# Patient Record
Sex: FEMALE | Race: WHITE | Employment: PART TIME | URBAN - METROPOLITAN AREA
[De-identification: names, ages, dates, MRNs, and addresses within clinical notes are randomized per-mention and may not be internally consistent; named-entity substitution may affect disease eponyms.]

---

## 2017-01-20 ENCOUNTER — TELEPHONE (OUTPATIENT)
Dept: FAMILY MEDICINE CLINIC | Age: 47
End: 2017-01-20

## 2017-01-20 DIAGNOSIS — Z12.39 SCREENING FOR BREAST CANCER: ICD-10-CM

## 2017-01-20 DIAGNOSIS — Z98.82 STATUS POST BREAST AUGMENTATION: Primary | ICD-10-CM

## 2017-01-20 NOTE — TELEPHONE ENCOUNTER
Rosalinda from [facility] radiology called this afternoon. She states patient's mammogram order  and requesting new order. She also states patient has implants and her ICD-10 code would be Z41.1. Please call her back at your earliest convenience, XV#[phone].

## 2017-01-23 NOTE — TELEPHONE ENCOUNTER
Spoke with Austin Jonas. She states that when call was placed requesting a new order patient was in the radiology department. Rosalinda contacted patient's OB/GYN who was available. Mammogram order was placed by OB/GYN.

## 2017-01-23 NOTE — TELEPHONE ENCOUNTER
Z41.1 states that it is s/p rhinoplasty. I added a different code for her history of breast augmentation.

## 2017-05-11 ENCOUNTER — HOSPITAL ENCOUNTER (OUTPATIENT)
Dept: LAB | Age: 47
Discharge: HOME | End: 2017-05-11
Payer: COMMERCIAL

## 2017-05-11 LAB
CHOLEST SERPL-MCNC: 160 MG/DL
HDLC SERPL-MCNC: 62 MG/DL (ref 40–60)
HDLC SERPL: 2.6 {RATIO} (ref 0–5)
LDLC SERPL CALC-MCNC: 77.6 MG/DL (ref 0–100)
LIPID PROFILE,FLP: ABNORMAL
TRIGL SERPL-MCNC: 102 MG/DL (ref ?–150)
TSH SERPL DL<=0.05 MIU/L-ACNC: 4.48 UIU/ML (ref 0.36–3.74)
VLDLC SERPL CALC-MCNC: 20.4 MG/DL

## 2017-05-11 PROCEDURE — 84443 ASSAY THYROID STIM HORMONE: CPT | Performed by: FAMILY MEDICINE

## 2017-05-11 PROCEDURE — 36415 COLL VENOUS BLD VENIPUNCTURE: CPT | Performed by: FAMILY MEDICINE

## 2017-05-11 PROCEDURE — 80061 LIPID PANEL: CPT | Performed by: FAMILY MEDICINE

## 2017-05-18 ENCOUNTER — OFFICE VISIT (OUTPATIENT)
Dept: FAMILY MEDICINE CLINIC | Age: 47
End: 2017-05-18

## 2017-05-18 VITALS
WEIGHT: 142 LBS | OXYGEN SATURATION: 99 % | DIASTOLIC BLOOD PRESSURE: 76 MMHG | HEIGHT: 65 IN | HEART RATE: 81 BPM | RESPIRATION RATE: 14 BRPM | TEMPERATURE: 99 F | BODY MASS INDEX: 23.66 KG/M2 | SYSTOLIC BLOOD PRESSURE: 112 MMHG

## 2017-05-18 DIAGNOSIS — Z80.8 FAMILY HISTORY OF MELANOMA: ICD-10-CM

## 2017-05-18 DIAGNOSIS — Z91.048 ALLERGY TO ADHESIVE: ICD-10-CM

## 2017-05-18 DIAGNOSIS — E03.9 ACQUIRED HYPOTHYROIDISM: ICD-10-CM

## 2017-05-18 DIAGNOSIS — F32.A DEPRESSION, UNSPECIFIED DEPRESSION TYPE: Primary | ICD-10-CM

## 2017-05-18 DIAGNOSIS — M06.9 RHEUMATOID ARTHRITIS, INVOLVING UNSPECIFIED SITE, UNSPECIFIED RHEUMATOID FACTOR PRESENCE: ICD-10-CM

## 2017-05-18 RX ORDER — BUPROPION HYDROCHLORIDE 150 MG/1
TABLET ORAL
Qty: 30 TAB | Refills: 5 | Status: SHIPPED | OUTPATIENT
Start: 2017-05-18 | End: 2017-06-06 | Stop reason: SDUPTHER

## 2017-05-18 RX ORDER — LEVOTHYROXINE SODIUM 75 UG/1
75 TABLET ORAL
Qty: 30 TAB | Refills: 5 | Status: SHIPPED | OUTPATIENT
Start: 2017-05-18 | End: 2017-06-06

## 2017-05-18 NOTE — PROGRESS NOTES
Chief Complaint   Patient presents with    Thyroid Problem    Depression    Results     review     1. Have you been to the ER, urgent care clinic since your last visit? Hospitalized since your last visit? No    2. Have you seen or consulted any other health care providers outside of the [address] [place] [address] since your last visit? Include any pap smears or colon screening.  Yes, Dr. Cris Schneider

## 2017-05-18 NOTE — MR AVS SNAPSHOT
Visit Information Date & Time Provider Department Dept. Phone Encounter #  
 5/18/2017  9:45 AM Ashly Mcduffie, [address] [id number] Follow-up Instructions Return in about 6 months (around 11/18/2017) for thyroid. Fasting labs due 3-7 days prior to appointment. Upcoming Health Maintenance Date Due INFLUENZA AGE 9 TO ADULT 8/1/2017 BREAST CANCER SCRN MAMMOGRAM 1/20/2018 PAP AKA CERVICAL CYTOLOGY 7/29/2019 DTaP/Tdap/Td series (2 - Td) 11/14/2023 Allergies as of 5/18/2017  Review Complete On: 5/18/2017 By: Ashly Mcduffie MD  
 No Known Allergies Current Immunizations  Reviewed on 9/8/2016 Name Date Influenza Vaccine 10/7/2016, 1/8/2015, 11/14/2013 Influenza Vaccine (Quad) PF 10/15/2015 Pneumococcal Polysaccharide (PPSV-23) 1/6/2014 Tdap 11/14/2013 Not reviewed this visit You Were Diagnosed With   
  
 Codes Comments Depression, unspecified depression type    -  Primary ICD-10-CM: F32.9 ICD-9-CM: 880 Acquired hypothyroidism     ICD-10-CM: E03.9 ICD-9-CM: 737. 9 Rheumatoid arthritis, involving unspecified site, unspecified rheumatoid factor presence ([facility] 75.)     ICD-10-CM: M06.9 ICD-9-CM: 714.0 Family history of melanoma     ICD-10-CM: Z80.8 ICD-9-CM: V16.8 Allergy to adhesive     ICD-10-CM: Z91.048 ICD-9-CM: V15.09 Vitals BP Pulse Temp Resp Height(growth percentile) Weight(growth percentile) 112/76 (BP 1 Location: Left arm, BP Patient Position: Sitting) 81 99 °F (37.2 °C) (Oral) 14 5' 5\" (1.651 m) 142 lb (64.4 kg) LMP SpO2 BMI OB Status Smoking Status 05/04/2017 99% 23.63 kg/m2 Having regular periods Never Smoker Vitals History BMI and BSA Data Body Mass Index Body Surface Area  
 23.63 kg/m 2 1.72 m 2 Preferred Pharmacy Pharmacy Name Phone [facility] [address] [place] [address], [facility] 651 [phone] Your Updated Medication List  
  
   
This list is accurate as of: 5/18/17 10:03 AM.  Always use your most recent med list.  
  
  
  
  
 buPROPion  mg tablet Commonly known as:  WELLBUTRIN XL  
take 1 tablet by mouth every morning  
  
 levothyroxine 75 mcg tablet Commonly known as:  SYNTHROID Take 1 Tab by mouth Daily (before breakfast). multivitamin tablet Commonly known as:  ONE A DAY Take 1 Tab by mouth daily. naproxen 500 mg tablet Commonly known as:  NAPROSYN Take 500 mg by mouth two (2) times daily (with meals). PLAQUENIL 200 mg tablet Generic drug:  hydroxychloroquine Take 200 mg by mouth daily. TYLENOL 325 mg tablet Generic drug:  acetaminophen Take  by mouth every four (4) hours as needed. Prescriptions Sent to Pharmacy Refills buPROPion XL (WELLBUTRIN XL) 150 mg tablet 5 Sig: take 1 tablet by mouth every morning Class: Normal  
 Pharmacy: [address] [place] [address] Ph #: [phone]  
 levothyroxine (SYNTHROID) 75 mcg tablet 5 Sig: Take 1 Tab by mouth Daily (before breakfast). Class: Normal  
 Pharmacy: [id number] [place] [address] Ph #: [phone] Route: Oral  
  
Follow-up Instructions Return in about 6 months (around 11/18/2017) for thyroid. Fasting labs due 3-7 days prior to appointment. To-Do List   
 05/18/2017 Lab:  TSH 3RD GENERATION Patient Instructions A Healthy Lifestyle: Care Instructions Your Care Instructions A healthy lifestyle can help you feel good, stay at a healthy weight, and have plenty of energy for both work and play. A healthy lifestyle is something you can share with your whole family. A healthy lifestyle also can lower your risk for serious health problems, such as high blood pressure, heart disease, and diabetes. You can follow a few steps listed below to improve your health and the health of your family. Follow-up care is a key part of your treatment and safety. Be sure to make and go to all appointments, and call your doctor if you are having problems. Its also a good idea to know your test results and keep a list of the medicines you take. How can you care for yourself at home? · Do not eat too much sugar, fat, or fast foods. You can still have dessert and treats now and then. The goal is moderation. · Start small to improve your eating habits. Pay attention to portion sizes, drink less juice and soda pop, and eat more fruits and vegetables. ¨ Eat a healthy amount of food. A 3-ounce serving of meat, for example, is about the size of a deck of cards. Fill the rest of your plate with vegetables and whole grains. ¨ Limit the amount of soda and sports drinks you have every day. Drink more water when you are thirsty. ¨ Eat at least 5 servings of fruits and vegetables every day. It may seem like a lot, but it is not hard to reach this goal. A serving or helping is 1 piece of fruit, 1 cup of vegetables, or 2 cups of leafy, raw vegetables. Have an apple or some carrot sticks as an afternoon snack instead of a candy bar. Try to have fruits and/or vegetables at every meal. 
· Make exercise part of your daily routine. You may want to start with simple activities, such as walking, bicycling, or slow swimming. Try to be active 30 to 60 minutes every day. You do not need to do all 30 to 60 minutes all at once. For example, you can exercise 3 times a day for 10 or 20 minutes. Moderate exercise is safe for most people, but it is always a good idea to talk to your doctor before starting an exercise program. 
· Keep moving. Al Fall the lawn, work in the garden, or [employer]. Take the stairs instead of the elevator at work. · If you smoke, quit.  People who smoke have an increased risk for heart attack, stroke, cancer, and other lung illnesses. Quitting is hard, but there are ways to boost your chance of quitting tobacco for good. ¨ Use nicotine gum, patches, or lozenges. ¨ Ask your doctor about stop-smoking programs and medicines. ¨ Keep trying. In addition to reducing your risk of diseases in the future, you will notice some benefits soon after you stop using tobacco. If you have shortness of breath or asthma symptoms, they will likely get better within a few weeks after you quit. · Limit how much alcohol you drink. Moderate amounts of alcohol (up to 2 drinks a day for men, 1 drink a day for women) are okay. But drinking too much can lead to liver problems, high blood pressure, and other health problems. Family health If you have a family, there are many things you can do together to improve your health. · Eat meals together as a family as often as possible. · Eat healthy foods. This includes fruits, vegetables, lean meats and dairy, and whole grains. · Include your family in your fitness plan. Most people think of activities such as jogging or tennis as the way to fitness, but there are many ways you and your family can be more active. Anything that makes you breathe hard and gets your heart pumping is exercise. Here are some tips: 
¨ Walk to do errands or to take your child to school or the bus. ¨ Go for a family bike ride after dinner instead of watching TV. Where can you learn more? Go to http://andrew-arcelia.info/. Enter M131 in the search box to learn more about \"A Healthy Lifestyle: Care Instructions. \" Current as of: July 26, 2016 Content Version: 11.2 © 1445-9140 [employer]. Care instructions adapted under license by [employer] (which disclaims liability or warranty for this information).  If you have questions about a medical condition or this instruction, always ask your healthcare professional. [employer] disclaims any warranty or liability for your use of this information. Introducing \[facility]\"" & HEALTH SERVICES! Dear Karma Quintana: Thank you for requesting a [facility] [employer] account. Our records indicate that you already have an active [facility] [employer] account. You can access your account anytime at [email or web address] Did you know that you can access your hospital and ER discharge instructions at any time in [facility]? You can also review all of your test results from your hospital stay or ER visit. Additional Information If you have questions, please visit the Frequently Asked Questions section of the [employer] website at [email or web address] Remember, [facility] is NOT to be used for urgent needs. For medical emergencies, dial 911. Now available from your iPhone and Android! Please provide this summary of care documentation to your next provider. Your primary care clinician is listed as Tia Stover. If you have any questions after today's visit, please call [phone].

## 2017-05-18 NOTE — PATIENT INSTRUCTIONS

## 2017-05-18 NOTE — PROGRESS NOTES
Chief Complaint   Patient presents with    Thyroid Problem    Depression    Results     review     Subjective:   48 y.o. female for follow-up    TSH was slightly elevated. She has had some mild fatigue but says this does not impact her greatly. No longer with any changes in her period or with sensation of lactation. She has had an elevated prolactin but her MRI was normal.  She has not noticed any consistent changes in her skin or hair quality. She is on Wellbutrin for depression which she reports as well-controlled. Has RA and sees rheumatology regularly and has no complaints. Sees eye doctor every 6 months due to being on plaquenil. She has had a few moles removed at [facility] due to her family history of melanoma in her sister. Objective: The patient appears well, alert, oriented x 3, in no distress. Visit Vitals    /76 (BP 1 Location: Left arm, BP Patient Position: Sitting)    Pulse 81    Temp 99 °F (37.2 °C) (Oral)    Resp 14    Ht 5' 5\" (1.651 m)    Wt 142 lb (64.4 kg)    LMP 05/04/2017    SpO2 99%    BMI 23.63 kg/m2     ENT normal.  No adenopathy or thyromegaly. Chest: Clear, no w/r/. Heart:  Normal S1S2, RRR, no murmurs. Skin:  There are biopsy sites that are healing. Surrounding them is mild erythema in a bandaid distribution on her back. No warmth. No vesicles. Psych: normal affect. Mood good. Oriented x 3. Judgement and insight intact. Results for orders placed or performed during the hospital encounter of 05/11/17   LIPID PANEL   Result Value Ref Range    LIPID PROFILE          Cholesterol, total 160 <200 MG/DL    Triglyceride 102 <150 MG/DL    HDL Cholesterol 62 (H) 40 - 60 MG/DL    LDL, calculated 77.6 0 - 100 MG/DL    VLDL, calculated 20.4 MG/DL    CHOL/HDL Ratio 2.6 0 - 5.0     TSH 3RD GENERATION   Result Value Ref Range    TSH 4.48 (H) 0.36 - 3.74 uIU/mL     Assessment/Plan:       ICD-10-CM ICD-9-CM    1.  Depression, unspecified depression type F32.9 311 buPROPion XL (WELLBUTRIN XL) 150 mg tablet   2. Acquired hypothyroidism E03.9 244.9 levothyroxine (SYNTHROID) 75 mcg tablet      TSH 3RD GENERATION   3. Rheumatoid arthritis, involving unspecified site, unspecified rheumatoid factor presence (HCC) M06.9 714.0    4. Family history of melanoma Z80.8 V16.8    5. Allergy to adhesive Z91.048 V15.09      Reviewed labs. Depression - continue Wellbutrin XL 150mg daily as this is working well. Hypothyroidism - autoimmune component. Uncontrolled. Increase to synthroid 75mcg qam.  Check TSH in 6 months. RA - Continue rheumatology follow-up. Also to keep regular follow-up with eye doctor since on Plaquenil. Family history of melanoma - cont regular derm exams. Advised on limiting sun exposure which she is already doing. Allergy to adhesive - monitor to resolution. All chart history elements were reviewed by me at the time of the visit even though marked at time of note closure. Patient understands our medical plan. Patient has provided input and agrees with goals. Alternatives have been explained and offered. All questions answered. The patient is to call if condition worsens or fails to improve. Follow-up Disposition:  Return in about 6 months (around 11/18/2017) for thyroid. Fasting labs due 3-7 days prior to appointment.

## 2017-06-04 DIAGNOSIS — F32.A DEPRESSION, UNSPECIFIED DEPRESSION TYPE: ICD-10-CM

## 2017-06-05 ENCOUNTER — PATIENT MESSAGE (OUTPATIENT)
Dept: FAMILY MEDICINE CLINIC | Age: 47
End: 2017-06-05

## 2017-06-06 RX ORDER — BUPROPION HYDROCHLORIDE 150 MG/1
TABLET ORAL
Qty: 30 TAB | Refills: 5 | Status: SHIPPED | OUTPATIENT
Start: 2017-06-06 | End: 2017-12-18 | Stop reason: SDUPTHER

## 2017-06-06 RX ORDER — LEVOTHYROXINE SODIUM 50 UG/1
50 TABLET ORAL
Qty: 30 TAB | Refills: 5 | Status: SHIPPED | OUTPATIENT
Start: 2017-06-06 | End: 2017-11-16 | Stop reason: DRUGHIGH

## 2017-06-06 NOTE — TELEPHONE ENCOUNTER
From: Melissa Nichole  To: Mauri Segura MD  Sent: 6/5/2017 9:57 PM EDT  Subject: Non-Urgent Medical Question    Since starting the new dose of levothyroxine 75mg I have felt very achy in my joints, headache, fatigue and myou mood is very sad. I was hoping I could go back to the 50 mg. Not sure if it's the medication but it's the only new thing added since I saw you last. I felt good before this except for slight fatigue. I can live with that. Please let me know what you think. If we go back I will need a new script.

## 2017-11-02 ENCOUNTER — HOSPITAL ENCOUNTER (OUTPATIENT)
Dept: LAB | Age: 47
Discharge: HOME | End: 2017-11-02
Payer: COMMERCIAL

## 2017-11-02 LAB — TSH SERPL DL<=0.05 MIU/L-ACNC: 1.31 UIU/ML (ref 0.36–3.74)

## 2017-11-02 PROCEDURE — 84443 ASSAY THYROID STIM HORMONE: CPT | Performed by: FAMILY MEDICINE

## 2017-11-02 PROCEDURE — 36415 COLL VENOUS BLD VENIPUNCTURE: CPT | Performed by: FAMILY MEDICINE

## 2017-11-16 ENCOUNTER — OFFICE VISIT (OUTPATIENT)
Dept: FAMILY MEDICINE CLINIC | Age: 47
End: 2017-11-16

## 2017-11-16 VITALS
OXYGEN SATURATION: 99 % | TEMPERATURE: 98.9 F | HEART RATE: 73 BPM | DIASTOLIC BLOOD PRESSURE: 70 MMHG | BODY MASS INDEX: 24.16 KG/M2 | SYSTOLIC BLOOD PRESSURE: 104 MMHG | RESPIRATION RATE: 14 BRPM | WEIGHT: 145 LBS | HEIGHT: 65 IN

## 2017-11-16 DIAGNOSIS — C44.91 SKIN CANCER, BASAL CELL: ICD-10-CM

## 2017-11-16 DIAGNOSIS — M06.9 RHEUMATOID ARTHRITIS, INVOLVING UNSPECIFIED SITE, UNSPECIFIED RHEUMATOID FACTOR PRESENCE: ICD-10-CM

## 2017-11-16 DIAGNOSIS — E03.9 ACQUIRED HYPOTHYROIDISM: Primary | ICD-10-CM

## 2017-11-16 DIAGNOSIS — F32.A DEPRESSION, UNSPECIFIED DEPRESSION TYPE: ICD-10-CM

## 2017-11-16 DIAGNOSIS — Z80.8 FAMILY HISTORY OF MELANOMA: ICD-10-CM

## 2017-11-16 DIAGNOSIS — Z23 ENCOUNTER FOR IMMUNIZATION: ICD-10-CM

## 2017-11-16 PROBLEM — R79.89 ELEVATED PROLACTIN LEVEL: Status: ACTIVE | Noted: 2017-11-16

## 2017-11-16 PROBLEM — R79.89 ELEVATED PROLACTIN LEVEL: Status: RESOLVED | Noted: 2017-11-16 | Resolved: 2017-11-16

## 2017-11-16 RX ORDER — LEVOTHYROXINE SODIUM 75 UG/1
75 TABLET ORAL
COMMUNITY
End: 2017-11-16 | Stop reason: SDUPTHER

## 2017-11-16 RX ORDER — LEVOTHYROXINE SODIUM 75 UG/1
75 TABLET ORAL
Qty: 30 TAB | Refills: 5 | Status: SHIPPED | OUTPATIENT
Start: 2017-11-16 | End: 2017-12-18 | Stop reason: SDUPTHER

## 2017-11-16 NOTE — MR AVS SNAPSHOT
Visit Information Date & Time Provider Department Dept. Phone Encounter #  
 11/16/2017  9:00 AM Katelynn Ramos, [address] [id number] Follow-up Instructions Return in about 6 months (around 5/16/2018) for thyroid. Non-fasting lab due 3-7 days prior to appotSchedule w/Sary for a well woman exam in Jan.  
  
[facility] Maintenance Date Due Influenza Age 5 to Adult 8/1/2017 BREAST CANCER SCRN MAMMOGRAM 1/20/2018 PAP AKA CERVICAL CYTOLOGY 7/29/2019 DTaP/Tdap/Td series (2 - Td) 11/14/2023 Allergies as of 11/16/2017  Review Complete On: 11/16/2017 By: Katelynn Ramos MD  
 No Known Allergies Current Immunizations  Reviewed on 9/8/2016 Name Date Influenza Vaccine 10/7/2016, 1/8/2015, 11/14/2013 Influenza Vaccine (Quad) PF 10/15/2015 Pneumococcal Polysaccharide (PPSV-23) 1/6/2014 Tdap 11/14/2013 Not reviewed this visit You Were Diagnosed With   
  
 Codes Comments Acquired hypothyroidism    -  Primary ICD-10-CM: E03.9 ICD-9-CM: 244.9 Depression, unspecified depression type     ICD-10-CM: F32.9 ICD-9-CM: 487 Rheumatoid arthritis, involving unspecified site, unspecified rheumatoid factor presence ([facility] 75.)     ICD-10-CM: M06.9 ICD-9-CM: 714.0 Skin cancer, basal cell     ICD-10-CM: C44.91 
ICD-9-CM: 173.91 Family history of melanoma     ICD-10-CM: Z80.8 ICD-9-CM: V16.8 Vitals BP Pulse Temp Resp Height(growth percentile) Weight(growth percentile) 104/70 (BP 1 Location: Left arm, BP Patient Position: Sitting) 73 98.9 °F (37.2 °C) (Oral) 14 5' 5\" (1.651 m) 145 lb (65.8 kg) LMP SpO2 BMI OB Status Smoking Status 11/01/2017 99% 24.13 kg/m2 Having regular periods Never Smoker Vitals History BMI and BSA Data Body Mass Index Body Surface Area  
 24.13 kg/m 2 1.74 m 2 Preferred Pharmacy Pharmacy Name Phone [facility] [address], [address] [place] [address] [phone] Your Updated Medication List  
  
   
This list is accurate as of: 11/16/17  9:35 AM.  Always use your most recent med list.  
  
  
  
  
 buPROPion  mg tablet Commonly known as:  WELLBUTRIN XL  
take 1 tablet by mouth every morning  
  
 levothyroxine 75 mcg tablet Commonly known as:  SYNTHROID Take 1 Tab by mouth Daily (before breakfast). multivitamin tablet Commonly known as:  ONE A DAY Take 1 Tab by mouth daily. naproxen 500 mg tablet Commonly known as:  NAPROSYN Take 500 mg by mouth two (2) times daily (with meals). OTHER(NON-FORMULARY) Take 2 Caps by mouth daily. Ritual Essential for Women vitamin PLAQUENIL 200 mg tablet Generic drug:  hydroxychloroquine Take 200 mg by mouth daily. TYLENOL 325 mg tablet Generic drug:  acetaminophen Take  by mouth every four (4) hours as needed. Prescriptions Sent to Pharmacy Refills  
 levothyroxine (SYNTHROID) 75 mcg tablet 5 Sig: Take 1 Tab by mouth Daily (before breakfast). Class: Normal  
 Pharmacy: [id number] [address] #: [phone] Route: Oral  
  
Follow-up Instructions Return in about 6 months (around 5/16/2018) for thyroid. Non-fasting lab due 3-7 days prior to appFrances Coello for a well woman exam in Jan.  
  
To-Do List   
 11/16/2017 Lab:  TSH 3RD GENERATION \[facility]\"" & HEALTH SERVICES! Dear Andrew Kaur: Thank you for requesting a [employer] account. Our records indicate that you already have an active [employer] account. You can access your account anytime at [email or web address] Did you know that you can access your hospital and ER discharge instructions at any time in [employer]? You can also review all of your test results from your hospital stay or ER visit. Additional Information If you have questions, please visit the Frequently Asked Questions section of the [employer]and[employer] website at [email or web address] Remember, [employer] is NOT to be used for urgent needs. For medical emergencies, dial 911. Now available from your iPhone and Android! Please provide this summary of care documentation to your next provider. Your primary care clinician is listed as Sena Prakash. If you have any questions after today's visit, please call [phone].

## 2017-11-16 NOTE — PATIENT INSTRUCTIONS

## 2017-11-16 NOTE — PROGRESS NOTES
Chief Complaint   Patient presents with    Results     review    Thyroid Problem     WNL on most recent labs    Arthritis     RA followed by rheumatology; last OV: 4/7/17 f/u scheduled for 11/17/17    Skin Cancer     h/o; followed by Tammiereyes Benjamins; last OV: 6/30/17; f/u scheduled for 12/22/17     Subjective:   50 y.o. female for follow-up    TSH was slightly elevated 6 months ago and we increased her synthroid from 50mcg to 75mcg. She says initially she thought she had side effects from it but then she continued and those resolved. No longer with any changes in her period or with sensation of lactation. She has had an elevated prolactin but her MRI was normal.  She has not noticed any consistent changes in her skin or hair quality. She is on Wellbutrin for depression which she reports as well-controlled. Has RA and sees rheumatology regularly and has no complaints. Sees eye doctor every 6 months due to being on plaquenil. She has labs with them regularly. She has had a few moles removed at [facility] due to her family history of melanoma in her sister two of which showed basal cell cancer. One was on her chest and the other on her back. The one on her back was widely excised but the chest one was superficial.    Objective: The patient appears well, alert, oriented x 3, in no distress. Visit Vitals    /70 (BP 1 Location: Left arm, BP Patient Position: Sitting)    Pulse 73    Temp 98.9 °F (37.2 °C) (Oral)    Resp 14    Ht 5' 5\" (1.651 m)    Wt 145 lb (65.8 kg)    LMP 11/01/2017    SpO2 99%    BMI 24.13 kg/m2     ENT normal.  No adenopathy or thyromegaly. Chest: Clear, no w/r/. Heart:  Normal S1S2, RRR, no murmurs. Psych: normal affect. Mood good. Oriented x 3. Judgement and insight intact.        Results for orders placed or performed during the hospital encounter of 11/02/17   TSH 3RD GENERATION   Result Value Ref Range    TSH 1.31 0.36 - 3.74 uIU/mL Assessment/Plan:       ICD-10-CM ICD-9-CM    1. Acquired hypothyroidism E03.9 244.9 TSH 3RD GENERATION   2. Depression, unspecified depression type F32.9 311    3. Rheumatoid arthritis, involving unspecified site, unspecified rheumatoid factor presence (HCC) M06.9 714.0    4. Skin cancer, basal cell C44.91 173.91    5. Family history of melanoma Z80.8 V16.8      Reviewed labs. Hypothyroidism - autoimmune component. TSH better on increased dose of Synthroid. Cont synthroid 75mcg qam.  Check TSH in 6 months. Depression - continue Wellbutrin XL 150mg daily as this is working well. RA - Continue rheumatology follow-up. Also to keep regular follow-up with eye doctor since on Plaquenil. Labs per rheum. Reviewed notes. Basal cell cancer with a family history of melanoma - cont regular derm exams. Reviewed notes. Flu vaccine administered. 25 minutes of face to face time spent with the patient with at least 50% on counseling on above medical issues. All chart history elements were reviewed by me at the time of the visit even though marked at time of note closure. Patient understands our medical plan. Patient has provided input and agrees with goals. Alternatives have been explained and offered. All questions answered. The patient is to call if condition worsens or fails to improve. Follow-up Disposition:  Return in about 6 months (around 5/16/2018) for thyroid.   Non-fasting lab due 3-7 days prior to appotSyamini w/Sary for a well woman exam in Jan.

## 2017-11-16 NOTE — PROGRESS NOTES
Chief Complaint   Patient presents with    Results     review    Thyroid Problem     WNL on most recent labs    Arthritis     RA followed by rheumatology; last OV: 4/7/17 f/u scheduled for 11/17/17    Skin Cancer     h/o; followed by Marsha Delong; last OV: 6/30/17; f/u scheduled for 12/22/17     Levothyroxine 75 mcg was started 10/1/17. 1. Have you been to the ER, urgent care clinic since your last visit? Hospitalized since your last visit? No    2. Have you seen or consulted any other health care providers outside of the [address] [place] [address] since your last visit? Include any pap smears or colon screening. Yes Where: Dr. Chapin Trujillo and Dr. Karla Zavala    Physician order obtained. Patient completed adult immunization consent form. Allergies, contraindications and recommendations reviewed with patient. Seasonal influenza vaccine administered IM right deltoid. Patient tolerated well. Patient remained in office for 15 minutes after injection and no adverse reactions were noted.

## 2017-12-18 DIAGNOSIS — F32.A DEPRESSION, UNSPECIFIED DEPRESSION TYPE: ICD-10-CM

## 2017-12-18 RX ORDER — LEVOTHYROXINE SODIUM 75 UG/1
75 TABLET ORAL
Qty: 90 TAB | Refills: 1 | Status: SHIPPED | OUTPATIENT
Start: 2017-12-18 | End: 2018-05-24 | Stop reason: SDUPTHER

## 2017-12-18 RX ORDER — BUPROPION HYDROCHLORIDE 150 MG/1
150 TABLET ORAL DAILY
Qty: 90 TAB | Refills: 1 | Status: SHIPPED | OUTPATIENT
Start: 2017-12-18 | End: 2018-05-24 | Stop reason: SDUPTHER

## 2017-12-18 NOTE — TELEPHONE ENCOUNTER
This patient contacted office for the following prescriptions to be filled:PT IS REQUESTING 90 DAY REFILLS     Medication requested :   Requested Prescriptions     Pending Prescriptions Disp Refills    buPROPion XL (WELLBUTRIN XL) 150 mg tablet 30 Tab 5    levothyroxine (SYNTHROID) 75 mcg tablet 30 Tab 5     Sig: Take 1 Tab by mouth Daily (before breakfast).      PCP: Christina Lopez 39. or Print: [facility]   Mail order or Local pharmacy DEJA Stokes 8     Scheduled appointment if not seen by current providers in office: LOV  11/16/2017 f/u 5/24/2018

## 2018-01-10 ENCOUNTER — OFFICE VISIT (OUTPATIENT)
Dept: FAMILY MEDICINE CLINIC | Age: 48
End: 2018-01-10

## 2018-01-10 ENCOUNTER — TELEPHONE (OUTPATIENT)
Dept: FAMILY MEDICINE CLINIC | Age: 48
End: 2018-01-10

## 2018-01-10 VITALS
HEIGHT: 65 IN | RESPIRATION RATE: 18 BRPM | DIASTOLIC BLOOD PRESSURE: 72 MMHG | TEMPERATURE: 98 F | WEIGHT: 147 LBS | OXYGEN SATURATION: 99 % | HEART RATE: 76 BPM | SYSTOLIC BLOOD PRESSURE: 124 MMHG | BODY MASS INDEX: 24.49 KG/M2

## 2018-01-10 DIAGNOSIS — Z98.82 H/O BILATERAL BREAST IMPLANTS: ICD-10-CM

## 2018-01-10 DIAGNOSIS — Z12.39 SCREENING FOR BREAST CANCER: ICD-10-CM

## 2018-01-10 DIAGNOSIS — Z01.419 WELL WOMAN EXAM WITH ROUTINE GYNECOLOGICAL EXAM: Primary | ICD-10-CM

## 2018-01-10 DIAGNOSIS — R19.7 DIARRHEA, UNSPECIFIED TYPE: ICD-10-CM

## 2018-01-10 DIAGNOSIS — Z12.39 SCREENING FOR BREAST CANCER: Primary | ICD-10-CM

## 2018-01-10 NOTE — MR AVS SNAPSHOT
Visit Information Date & Time Provider Department Dept. Phone Encounter #  
 1/10/2018  9:30 AM Antonio Britt, [address] [id number] Follow-up Instructions Return for follow up as scheduled with Dr. Lana Abarca. Your Appointments 5/24/2018  9:00 AM  
ROUTINE CARE with Isidra Matthews MD  
[facility] ([facility]) Appt Note: routine f/u 6mo [address] [place] [address] Drive [address] [facility]  
[address] [facility] Upcoming Health Maintenance Date Due  
 BREAST CANCER SCRN MAMMOGRAM 1/20/2018 PAP AKA CERVICAL CYTOLOGY 7/29/2019 DTaP/Tdap/Td series (2 - Td) 11/14/2023 Allergies as of 1/10/2018  Review Complete On: 1/10/2018 By: MANDA Arriola No Known Allergies Current Immunizations  Reviewed on 9/8/2016 Name Date Influenza Vaccine 10/7/2016, 1/8/2015, 11/14/2013 Influenza Vaccine (Quad) PF 11/16/2017, 10/15/2015 Pneumococcal Polysaccharide (PPSV-23) 1/6/2014 Tdap 11/14/2013 Not reviewed this visit You Were Diagnosed With   
  
 Codes Comments Well woman exam with routine gynecological exam    -  Primary ICD-10-CM: D25.821 ICD-9-CM: V72.31 Screening for breast cancer     ICD-10-CM: Z12.31 
ICD-9-CM: V76.10 Diarrhea, unspecified type     ICD-10-CM: R19.7 ICD-9-CM: 787.91 Vitals BP Pulse Temp Resp Height(growth percentile) Weight(growth percentile) 124/72 76 98 °F (36.7 °C) (Oral) 18 5' 5\" (1.651 m) 147 lb (66.7 kg) SpO2 BMI OB Status Smoking Status 99% 24.46 kg/m2 Having regular periods Never Smoker Vitals History BMI and BSA Data Body Mass Index Body Surface Area  
 24.46 kg/m 2 1.75 m 2 Preferred Pharmacy Pharmacy Name Phone [facility] [address] , [facility] [place] [id number] [phone] Your Updated Medication List  
  
   
This list is accurate as of: 1/10/18 10:13 AM.  Always use your most recent med list.  
  
  
  
  
 buPROPion  mg tablet Commonly known as:  Mentone Kil Take 1 Tab by mouth daily. levothyroxine 75 mcg tablet Commonly known as:  SYNTHROID Take 1 Tab by mouth Daily (before breakfast). multivitamin tablet Commonly known as:  ONE A DAY Take 1 Tab by mouth daily. naproxen 500 mg tablet Commonly known as:  NAPROSYN Take 500 mg by mouth two (2) times daily (with meals). OTHER(NON-FORMULARY) Take 2 Caps by mouth daily. Ritual Essential for Women vitamin PLAQUENIL 200 mg tablet Generic drug:  hydroxychloroquine Take 200 mg by mouth daily. TYLENOL 325 mg tablet Generic drug:  acetaminophen Take  by mouth every four (4) hours as needed. Follow-up Instructions Return for follow up as scheduled with Dr. Nereida Pitts. To-Do List   
 01/10/2018 Microbiology:  C. DIFFICILE/EPI PCR   
  
 01/10/2018 Microbiology:  CULTURE, STOOL   
  
 01/10/2018 Imaging:  RIDDHI MAMMO BI DX INCL CAD   
  
 01/10/2018 Microbiology:  OVA & PARASITES, STOOL Around 01/11/2018 Lab:  CBC WITH AUTOMATED DIFF Around 01/11/2018 Lab:  TSH 3RD GENERATION Introducing [facility]! Dear Mariusz Forman: Thank you for requesting a [employer] account. Our records indicate that you already have an active [employer] account. You can access your account anytime at [email or web address] Did you know that you can access your hospital and ER discharge instructions at any time in [employer]? You can also review all of your test results from your hospital stay or ER visit. Additional Information If you have questions, please visit the Frequently Asked Questions section of the [employer] website at [email or web address] Remember, [email or web address] is NOT to be used for urgent needs. For medical emergencies, dial 911. Now available from your iPhone and Android! Please provide this summary of care documentation to your next provider. Your primary care clinician is listed as Fina Bell. If you have any questions after today's visit, please call [phone].

## 2018-01-10 NOTE — PROGRESS NOTES
Patient: Jason Johnson MRN: [id number]  SSN: [id number]    YOB: 1970  Age: 50 y.o. Sex: female      Date of Service: 1/10/2018   Provider: MANDA Dee   Office Location:   [address] [place] [address]  [address], [address]  Office Phone: [phone]  Office Fax: [phone]        REASON FOR VISIT:   Chief Complaint   Patient presents with    Well Woman        VITALS:   Visit Vitals    /72    Pulse 76    Temp 98 °F (36.7 °C) (Oral)    Resp 18    Ht 5' 5\" (1.651 m)    Wt 147 lb (66.7 kg)    SpO2 99%    BMI 24.46 kg/m2       MEDICATIONS:   Current Outpatient Prescriptions   Medication Sig Dispense Refill    buPROPion XL (WELLBUTRIN XL) 150 mg tablet Take 1 Tab by mouth daily. 90 Tab 1    levothyroxine (SYNTHROID) 75 mcg tablet Take 1 Tab by mouth Daily (before breakfast). 90 Tab 1    OTHER,NON-FORMULARY, Take 2 Caps by mouth daily. Ritual Essential for Women vitamin      naproxen (NAPROSYN) 500 mg tablet Take 500 mg by mouth two (2) times daily (with meals).  multivitamin (ONE A DAY) tablet Take 1 Tab by mouth daily.  hydroxychloroquine (PLAQUENIL) 200 mg tablet Take 200 mg by mouth daily.  acetaminophen (TYLENOL) 325 mg tablet Take  by mouth every four (4) hours as needed. ALLERGIES:   No Known Allergies     ACTIVE MEDICAL PROBLEMS:  Patient Active Problem List   Diagnosis Code    Migraine G43.909    RA (rheumatoid arthritis) ([facility] [address] M06.9    Acquired hypothyroidism E03.9    Depression F32.9    Family history of melanoma Z80.8    Skin cancer, basal cell C44.91          HISTORY OF PRESENT ILLNESS:   Jason Johnson is a 50 y.o. female who presents to the office for acute care. PCP: Dr. Weyman Skiff    Patient is here today for her routine well woman exam, but mentions complaints of chronic diarrhea x 2 months. Reports symptoms began suddenly one day. She had nausea at the time, but no vomiting.  Nausea has since resolved. She now describes frequent bowel movements, ranging from soft but formed, to liquidy and oily. Also with some crampy lower abdominal discomfort that precedes BMs and is usually relieved with defecation. Symptoms seem to be much worse first thing in the morning. She admits symptoms may be stress related, as she has a history of IBS in the remote past, but these symptoms are an acute change for her. She has been using OTC Imodium with some relief, but is concerned about prolonged use of this medication. She denies any fevers, chills, severe abdominal pain, change in appetite, significant weight loss, blood in stool or black, tarry stools. No personal or family hx of celiac or IBD. No recent travel or abx use. No recent change in meds, though she does state that her thyroid medication has been adjusted within the past few months. REVIEW OF SYSTEMS:  Review of Systems   Constitutional: Negative for chills, fever and malaise/fatigue. Respiratory: Negative for cough, shortness of breath and wheezing. Cardiovascular: Negative for chest pain, palpitations and orthopnea. Gastrointestinal: Positive for diarrhea. Negative for abdominal pain, blood in stool, constipation, nausea and vomiting. Neurological: Negative for dizziness and headaches. Psychiatric/Behavioral: Negative for depression. The patient is nervous/anxious. PHYSICAL EXAMINATION:  Physical Exam   Constitutional: She is oriented to person, place, and time and well-developed, well-nourished, and in no distress. Cardiovascular: Normal rate, regular rhythm and normal heart sounds. Exam reveals no gallop and no friction rub. No murmur heard. Pulmonary/Chest: Effort normal and breath sounds normal. She has no wheezes. She has no rales. Abdominal: Soft. Bowel sounds are normal. She exhibits no distension. There is no tenderness. There is no rebound and no guarding.    Neurological: She is alert and oriented to person, place, and time. Gait normal.   Skin: Skin is warm and dry. No rash noted. Psychiatric: Mood, memory and affect normal.        RESULTS:  No results found for this visit on 01/10/18. ASSESSMENT/PLAN:  Diagnoses and all orders for this visit:    1. Diarrhea, unspecified type  - Discussed differential diagnosis with patient, including but not limited to: IBS, IBD, celiac disease or food intolerance, infection  - Symptoms suspicious for diarrhea predominant IBS, but given that this is a new complaint, further workup is warranted  - For now, will check CBC and stool studies to r/o anemia/signs of infection, and TSH to r/o iatrogenic hyperthyroidism from increased dose of Synthroid  - In the meantime, encouraged supportive care with OTC fiber supplement, adequate hydration. Limit caffeine intake. Encouraged patient to experiment with her diet and try eliminating food groups such as dairy for a week at a time to see if this helps  - If symptoms persist and workup is non-revealing, encouraged her to return to see her PCP prior to scheduled follow up  Orders:    -     CBC WITH AUTOMATED DIFF; Future  -     TSH 3RD GENERATION; Future  -     CULTURE, STOOL; Future  -     C. DIFFICILE/EPI PCR; Future  -     OVA & PARASITES, STOOL; Future      Follow-up Disposition:  Return for follow up as scheduled with Dr. Lola Easton.      PATIENT CARE TEAM:   Patient Care Team:  Brice Huntley MD as PCP - General (Family Practice)  Rodney Boston DO (Rheumatology)  Sonny Manzo MD (Dermatology)  Michelle Gabriel MD (Obstetrics & Gynecology)  Sarah Mcdonald DO (Optometry)       [place], [place]   January 10, 2018    10:55 AM

## 2018-01-10 NOTE — TELEPHONE ENCOUNTER
I received a call from Bernice Abreu from scheduling about pt DX mammogram.Narcisa states \" that a new order will have to be done if pt is not having any pain or problems with breast other than her silicone implants. Bernice Abreu will fax over a form for new order to be done.  Bernice Abreu contact number is [phone]

## 2018-01-10 NOTE — PROGRESS NOTES
Patient: Scout Jimenez MRN: [id number]  SSN: [id number]    YOB: 1970  Age: 50 y.o. Sex: female        Date of Service: 1/10/2018   Provider: MANDA Ruiz   Office Location:   Office Location:   [facility], [address] [place], [address]  Office Phone: [phone]  Office Fax: [phone]      REASON FOR VISIT:   Chief Complaint   Patient presents with    Well Woman       VITALS:   Visit Vitals    /72    Pulse 76    Temp 98 °F (36.7 °C) (Oral)    Resp 18    Ht 5' 5\" (1.651 m)    Wt 147 lb (66.7 kg)    SpO2 99%    BMI 24.46 kg/m2       MEDICATIONS:   Current Outpatient Prescriptions on File Prior to Visit   Medication Sig Dispense Refill    buPROPion XL (WELLBUTRIN XL) 150 mg tablet Take 1 Tab by mouth daily. 90 Tab 1    levothyroxine (SYNTHROID) 75 mcg tablet Take 1 Tab by mouth Daily (before breakfast). 90 Tab 1    OTHER,NON-FORMULARY, Take 2 Caps by mouth daily. Ritual Essential for Women vitamin      naproxen (NAPROSYN) 500 mg tablet Take 500 mg by mouth two (2) times daily (with meals).  multivitamin (ONE A DAY) tablet Take 1 Tab by mouth daily.  hydroxychloroquine (PLAQUENIL) 200 mg tablet Take 200 mg by mouth daily.  acetaminophen (TYLENOL) 325 mg tablet Take  by mouth every four (4) hours as needed. No current facility-administered medications on file prior to visit.          ALLERGIES:   No Known Allergies     PROBLEM LIST:  Patient Active Problem List   Diagnosis Code    Migraine G43.909    RA (rheumatoid arthritis) ([facility]) M06.9    Acquired hypothyroidism E03.9    Depression F32.9    Family history of melanoma Z80.8    Skin cancer, basal cell C44.91        PAST MEDICAL HISTORY:  Past Medical History:   Diagnosis Date    Arthritis     Basal cell carcinoma of skin 2017    sees derm q6 months    Hypothyroid     Migraine 1/4/2010    Positive MARLI (antinuclear antibody) 5/2013    RA (rheumatoid arthritis) (HCC)     working diagnosis so far, sees Dr. Lucy Ring:  Past Surgical History:   Procedure Laterality Date    HX APPENDECTOMY      HX BREAST AUGMENTATION  594 (replaced silicone with saline)    x2    HX TUBAL LIGATION          FAMILY HISTORY:  Family History   Problem Relation Age of Onset   [address] [facility] [address] Migraines Mother     Stroke Mother     Cancer Sister      melanoma, skin    Hypertension Maternal Grandmother     Diabetes Maternal Grandmother     Colon Cancer Maternal Grandfather     Hypertension Maternal Grandfather     Diabetes Maternal Grandfather     Other Son      ADD        SOCIAL HISTORY:  Social History     Social History    Marital status:      Spouse name: N/A    Number of children: N/A    Years of education: N/A     Occupational History          Social History Main Topics    Smoking status: Never Smoker    Smokeless tobacco: Never Used    Alcohol use Yes      Comment: 1-2 (rare)    Drug use: No    Sexual activity: Yes     Partners: Male     Other Topics Concern    None     Social History Narrative        OBSTETRIC HISTORY:  OB History      Para Term  AB Living    3 3 2 1  2    SAB TAB Ectopic Molar Multiple Live Births                    MENSTRUAL HISTORY:  LMP: around 15  Length of Menses: usually 3-4 days, sometimes as long as 7 days  Length of Cycle: 28 days, regular  Menstrual Complaints: none     SEXUAL HISTORY:  Sexual activity: Patient is sexually active with 1 male partner, not concerned for STIs   Contraceptive method: none  History of STIs: none     HEALTH SCREENING HISTORY:  Last pap: 14  Results: normal  History of abnormal pap?: NO    Last mammogram: 15, due soon.  Patient gets diagnostic mammos for screening as she has saline breast implants  Last DEXA scan: N/A  Last colorectal screening: N/A, but will be due later this year       HPI:  Kellen Mederos is a 50 y.o. female who presents to the office for her annual well woman exam.       REVIEW OF SYSTEMS:   Review of Systems   Constitutional: Negative for chills, fever and malaise/fatigue. Respiratory: Negative for cough, shortness of breath and wheezing. Cardiovascular: Negative for chest pain, palpitations and leg swelling. Gastrointestinal: Positive for diarrhea ( see separate documentation for acute care). Negative for abdominal pain, blood in stool, constipation, nausea and vomiting. Genitourinary: Negative for dysuria, frequency and urgency. Neurological: Negative for dizziness and headaches. Breasts: Denies masses, skin changes, nipple discharge  Genitourinary: Denies pelvic pain, dyspareunia, abnormal vaginal bleeding or discharge, urinary frequency, urgency, dysuria, hematuria. PHYSICAL EXAMINATION:   Physical Exam   Constitutional: She is oriented to person, place, and time and well-developed, well-nourished, and in no distress. HENT:   Head: Normocephalic and atraumatic. Neck: Neck supple. No thyromegaly present. Cardiovascular: Normal rate, regular rhythm and normal heart sounds. Exam reveals no gallop and no friction rub. No murmur heard. Pulmonary/Chest: Effort normal and breath sounds normal. She has no wheezes. She has no rales. Abdominal: Soft. Bowel sounds are normal. She exhibits no distension. There is no tenderness. Lymphadenopathy:     She has no cervical adenopathy. Neurological: She is alert and oriented to person, place, and time. Gait normal.   Skin: Skin is warm and dry. No rash noted. Psychiatric: Mood, memory and affect normal.   Breasts: Implants b/l. Symmetric bilaterally without skin or nipple changes, tenderness, or masses  Pelvic: External genitalia - no erythema, rashes, or lesions. Internal - vagina pink rugae without lesions or discharge. Cervix - pink, non-friable, os patent with no discharge. No cervical motion tenderness. Bimanual - Uterus and adnexae non-tender. No masses palpated. ASSESSMENT/PLAN:  Diagnoses and all orders for this visit:    1. Well woman exam with routine gynecological exam  - Normal routine physical exam findings today  - Encouraged healthy diet, regular exercise, stress reduction   - Pap not indicated today, will be due next year     2. Screening for breast cancer  - Dx mammo ordered to be done at the end of this month  Orders:    -     RIDDHI MAMMO BI DX INCL CAD; Future      Follow-up Disposition:  Return for follow up as scheduled with Dr. Edward Zimmerman.      MANDA De Paz  1/10/2018   10:47 AM

## 2018-01-11 ENCOUNTER — HOSPITAL ENCOUNTER (OUTPATIENT)
Dept: LAB | Age: 48
Discharge: HOME | End: 2018-01-11
Payer: COMMERCIAL

## 2018-01-11 LAB
BACTERIA SPEC CULT: NORMAL
BASOPHILS # BLD: 0 K/UL (ref 0–0.06)
BASOPHILS NFR BLD: 0 % (ref 0–2)
DIFFERENTIAL METHOD BLD: ABNORMAL
EOSINOPHIL # BLD: 0.1 K/UL (ref 0–0.4)
EOSINOPHIL NFR BLD: 1 % (ref 0–5)
ERYTHROCYTE [DISTWIDTH] IN BLOOD BY AUTOMATED COUNT: 13 % (ref 11.6–14.5)
HCT VFR BLD AUTO: 40.9 % (ref 35–45)
HGB BLD-MCNC: 13.4 G/DL (ref 12–16)
LYMPHOCYTES # BLD: 1 K/UL (ref 0.9–3.6)
LYMPHOCYTES NFR BLD: 20 % (ref 21–52)
MCH RBC QN AUTO: 30.8 PG (ref 24–34)
MCHC RBC AUTO-ENTMCNC: 32.8 G/DL (ref 31–37)
MCV RBC AUTO: 94 FL (ref 74–97)
MONOCYTES # BLD: 0.8 K/UL (ref 0.05–1.2)
MONOCYTES NFR BLD: 15 % (ref 3–10)
NEUTS SEG # BLD: 3.3 K/UL (ref 1.8–8)
NEUTS SEG NFR BLD: 64 % (ref 40–73)
PLATELET # BLD AUTO: 257 K/UL (ref 135–420)
PMV BLD AUTO: 9.3 FL (ref 9.2–11.8)
RBC # BLD AUTO: 4.35 M/UL (ref 4.2–5.3)
SERVICE CMNT-IMP: NORMAL
TSH SERPL DL<=0.05 MIU/L-ACNC: 2.89 UIU/ML (ref 0.36–3.74)
WBC # BLD AUTO: 5.2 K/UL (ref 4.6–13.2)

## 2018-01-11 PROCEDURE — 87493 C DIFF AMPLIFIED PROBE: CPT | Performed by: PHYSICIAN ASSISTANT

## 2018-01-11 PROCEDURE — 87045 FECES CULTURE AEROBIC BACT: CPT | Performed by: PHYSICIAN ASSISTANT

## 2018-01-11 PROCEDURE — 85025 COMPLETE CBC W/AUTO DIFF WBC: CPT | Performed by: PHYSICIAN ASSISTANT

## 2018-01-11 PROCEDURE — 84443 ASSAY THYROID STIM HORMONE: CPT | Performed by: PHYSICIAN ASSISTANT

## 2018-01-11 PROCEDURE — 36415 COLL VENOUS BLD VENIPUNCTURE: CPT | Performed by: PHYSICIAN ASSISTANT

## 2018-01-11 PROCEDURE — 87177 OVA AND PARASITES SMEARS: CPT | Performed by: PHYSICIAN ASSISTANT

## 2018-01-11 NOTE — TELEPHONE ENCOUNTER
It appears patient has always had diagnostic mammograms in the past, due to her implants, but will change order to screening mammogram per recommendations from the breast center.

## 2018-01-13 LAB
BACTERIA SPEC CULT: NORMAL
BACTERIA SPEC CULT: NORMAL
SERVICE CMNT-IMP: NORMAL

## 2018-01-15 ENCOUNTER — TELEPHONE (OUTPATIENT)
Dept: FAMILY MEDICINE CLINIC | Age: 48
End: 2018-01-15

## 2018-01-15 LAB
O+P SPEC MICRO: NORMAL
O+P STL CONC: NORMAL
SPECIMEN SOURCE: NORMAL

## 2018-01-15 NOTE — TELEPHONE ENCOUNTER
Patient called returning MANDA Okeefe's phone call. Please call her back at your earliest convenience.

## 2018-01-15 NOTE — TELEPHONE ENCOUNTER
Spoke to patient regarding lab and stool results. CBC and TSH were normal. Stool cultures were negative for any signs of acute infection, however stool culture comment states \"reduced gram negative enteric microbiota. \" Suggested to patient that she try an OTC probiotic such as Align or Florastor. She will continue to experiment with her diet as well. She reports she has cut out coffee, which has helped her diarrhea to some extent. Advised patient that if symptoms do not improve over the next few weeks, to schedule a follow up visit with her PCP for more thorough workup. Patient expresses understanding and agrees to this plan.

## 2018-05-10 ENCOUNTER — HOSPITAL ENCOUNTER (OUTPATIENT)
Dept: LAB | Age: 48
Discharge: HOME | End: 2018-05-10

## 2018-05-10 PROCEDURE — 99001 SPECIMEN HANDLING PT-LAB: CPT | Performed by: FAMILY MEDICINE

## 2018-05-11 LAB — TSH SERPL DL<=0.005 MIU/L-ACNC: 2.4 UIU/ML (ref 0.45–4.5)

## 2018-05-24 ENCOUNTER — OFFICE VISIT (OUTPATIENT)
Dept: FAMILY MEDICINE CLINIC | Age: 48
End: 2018-05-24

## 2018-05-24 VITALS
HEIGHT: 65 IN | SYSTOLIC BLOOD PRESSURE: 110 MMHG | WEIGHT: 142 LBS | OXYGEN SATURATION: 99 % | RESPIRATION RATE: 16 BRPM | TEMPERATURE: 98.8 F | HEART RATE: 78 BPM | BODY MASS INDEX: 23.66 KG/M2 | DIASTOLIC BLOOD PRESSURE: 70 MMHG

## 2018-05-24 DIAGNOSIS — M06.9 RHEUMATOID ARTHRITIS, INVOLVING UNSPECIFIED SITE, UNSPECIFIED RHEUMATOID FACTOR PRESENCE: ICD-10-CM

## 2018-05-24 DIAGNOSIS — G43.809 OTHER MIGRAINE WITHOUT STATUS MIGRAINOSUS, NOT INTRACTABLE: ICD-10-CM

## 2018-05-24 DIAGNOSIS — C44.91 SKIN CANCER, BASAL CELL: ICD-10-CM

## 2018-05-24 DIAGNOSIS — E03.9 ACQUIRED HYPOTHYROIDISM: Primary | ICD-10-CM

## 2018-05-24 DIAGNOSIS — F32.A DEPRESSION, UNSPECIFIED DEPRESSION TYPE: ICD-10-CM

## 2018-05-24 DIAGNOSIS — Z23 ENCOUNTER FOR IMMUNIZATION: ICD-10-CM

## 2018-05-24 RX ORDER — LEVOTHYROXINE SODIUM 75 UG/1
75 TABLET ORAL
Qty: 90 TAB | Refills: 1 | Status: SHIPPED | OUTPATIENT
Start: 2018-05-24 | End: 2018-11-16 | Stop reason: SDUPTHER

## 2018-05-24 RX ORDER — PSYLLIUM HUSK 100 %
POWDER (GRAM) MISCELLANEOUS
COMMUNITY
End: 2020-01-06 | Stop reason: ALTCHOICE

## 2018-05-24 RX ORDER — BUPROPION HYDROCHLORIDE 150 MG/1
150 TABLET ORAL DAILY
Qty: 90 TAB | Refills: 1 | Status: SHIPPED | OUTPATIENT
Start: 2018-05-24 | End: 2018-06-15

## 2018-05-24 NOTE — PROGRESS NOTES
Chief Complaint   Patient presents with    Arthritis    Depression    Thyroid Problem     Subjective:   50 y.o. female for follow-up    TSH is currently normal on synthroid 75mcg daily. Overall feeling well. She is on Wellbutrin for depression which she reports as well-controlled. Has RA and sees rheumatology regularly and has no complaints. Sees eye doctor every 6 months due to being on plaquenil. She has labs with them regularly. She has had a few moles removed at [facility] due to her family history of melanoma in her sister two of which showed basal cell cancer. She is seeing dermatology regularly. Diarrhea has improved a lot on fiber and probiotics. Objective: The patient appears well, alert, oriented x 3, in no distress. Visit Vitals    /70 (BP 1 Location: Left arm, BP Patient Position: Sitting)    Pulse 78    Temp 98.8 °F (37.1 °C) (Oral)    Resp 16    Ht 5' 5\" (1.651 m)    Wt 142 lb (64.4 kg)    SpO2 99%    BMI 23.63 kg/m2     ENT normal.  No adenopathy or thyromegaly. Chest: Clear, no w/r/. Heart:  Normal S1S2, RRR, no murmurs. Psych: normal affect. Mood good. Oriented x 3. Judgement and insight intact. Results for orders placed or performed during the hospital encounter of 01/11/18   OVA & PARASITES, STOOL   Result Value Ref Range    Source STOOL      Ova & Parasite exam Final Report Below    CULTURE, STOOL   Result Value Ref Range    Special Requests: NO SPECIAL REQUESTS      Culture result:        NO AEROMONAS,SALMONELLA,SHIGELLA,E. COLI 0:157 OR CAMPYLOBACTER ISOLATED    Culture result: REDUCED GRAM NEGATIVE ENTERIC MICROBIOTA     C. DIFFICILE/EPI PCR   Result Value Ref Range    Special Requests: NO SPECIAL REQUESTS      Culture result:        Toxigenic C. difficile NEGATIVE                         C. difficile target DNA sequences are not detected.    CBC WITH AUTOMATED DIFF   Result Value Ref Range    WBC 5.2 4.6 - 13.2 K/uL    RBC 4.35 4.20 - 5.30 M/uL    HGB 13.4 12.0 - 16.0 g/dL    HCT 40.9 35.0 - 45.0 %    MCV 94.0 74.0 - 97.0 FL    MCH 30.8 24.0 - 34.0 PG    MCHC 32.8 31.0 - 37.0 g/dL    RDW 13.0 11.6 - 14.5 %    PLATELET 954 072 - 376 K/uL    MPV 9.3 9.2 - 11.8 FL    NEUTROPHILS 64 40 - 73 %    LYMPHOCYTES 20 (L) 21 - 52 %    MONOCYTES 15 (H) 3 - 10 %    EOSINOPHILS 1 0 - 5 %    BASOPHILS 0 0 - 2 %    ABS. NEUTROPHILS 3.3 1.8 - 8.0 K/UL    ABS. LYMPHOCYTES 1.0 0.9 - 3.6 K/UL    ABS. MONOCYTES 0.8 0.05 - 1.2 K/UL    ABS. EOSINOPHILS 0.1 0.0 - 0.4 K/UL    ABS. BASOPHILS 0.0 0.0 - 0.06 K/UL    DF AUTOMATED     TSH 3RD GENERATION   Result Value Ref Range    TSH 2.89 0.36 - 3.74 uIU/mL   OVA & PARASITES, STOOL, REFLEX   Result Value Ref Range    Result 1 Comment       Assessment/Plan:       ICD-10-CM ICD-9-CM    1. Acquired hypothyroidism E03.9 244.9 TSH 3RD GENERATION   2. Other migraine without status migrainosus, not intractable G43.809 346.80    3. Rheumatoid arthritis, involving unspecified site, unspecified rheumatoid factor presence (HCC) M06.9 714.0    4. Depression, unspecified depression type F32.9 311 buPROPion XL (WELLBUTRIN XL) 150 mg tablet   5. Skin cancer, basal cell C44.91 173.91    6. Encounter for immunization Z23 V03.89 HEPATITIS A AND HEPATITIS B (HEPA-HEPB), ADULT DOSAGE, IM      KS IMMUNIZ ADMIN,1 SINGLE/COMB VAC/TOXOID     Reviewed labs. Hypothyroidism - autoimmune component. TSH better on increased dose of Synthroid. Cont synthroid 75mcg qam.  Check TSH in 6 months. Migraines - no current complaints. RA - Continue rheumatology follow-up. Also to keep regular follow-up with eye doctor since on Plaquenil. Labs per rheum. Reviewed notes. Depression - continue Wellbutrin XL 150mg daily as this is working well. Basal cell cancer with a family history of melanoma - cont regular derm exams. Reviewed notes. Flu vaccine administered.      HepA/HepB vaccine #1 today due to upcoming travel [place] early 2924). Will need Hep B#2 in 1 month and then HepA/HepB in 6 months. 25 minutes of face to face time spent with the patient with at least 50% on counseling on above medical issues. All chart history elements were reviewed by me at the time of the visit even though marked at time of note closure. Patient understands our medical plan. Patient has provided input and agrees with goals. Alternatives have been explained and offered. All questions answered. The patient is to call if condition worsens or fails to improve. Follow-up Disposition:  Return in about 1 month (around 6/24/2018) for Hep B vaccine. Follow up in 6 months for routine care (non-fasting lab due prior).

## 2018-05-24 NOTE — PROGRESS NOTES
Rheumatology   LOV: 5/18/18 (note not available at this time)  Next OV: 11/16/18    Dermatology   F/u 7/20/18    Physician order obtained. Patient completed adult immunization consent form. Allergies, contraindications and recommendations reviewed with patient. Twinrix (Hep A and Hep B combo) vaccine administered IM left deltoid. Patient tolerated well. Patient remained in office for 15 minutes after injection and no adverse reactions were noted.

## 2018-05-24 NOTE — MR AVS SNAPSHOT
[facility] [address] 
[phone] Patient: Aric Melendez MRN: [id number] :1970 Visit Information Date & Time Provider Department Dept. Phone Encounter #  
 2018  9:00 AM Yovani Horan, [address] [facility] [address] [id number] Follow-up Instructions Return in about 1 month (around 2018) for Hep B vaccine. Follow up in 6 months for routine care (non-fasting lab due prior). Upcoming Health Maintenance Date Due Influenza Age 5 to Adult 2018 BREAST CANCER SCRN MAMMOGRAM 2019 PAP AKA CERVICAL CYTOLOGY 2019 DTaP/Tdap/Td series (2 - Td) 2023 Allergies as of 2018  Review Complete On: 2018 By: Amari Cervantes No Known Allergies Current Immunizations  Reviewed on 2018 Name Date Influenza Vaccine 10/7/2016, 2015, 2013 Influenza Vaccine (Quad) PF 2017, 10/15/2015 Pneumococcal Polysaccharide (PPSV-23) 2014 Tdap 2013 Reviewed by Amari Cervantes on 2018 at  8:43 AM  
 Reviewed by Yovani Horan MD on 2018 at  9:29 AM  
 Reviewed by Yovani Horan MD on 2018 at  9:30 AM  
You Were Diagnosed With   
  
 Codes Comments Acquired hypothyroidism    -  Primary ICD-10-CM: E03.9 ICD-9-CM: 244.9 Other migraine without status migrainosus, not intractable     ICD-10-CM: S62.635 ICD-9-CM: 346.80 Rheumatoid arthritis, involving unspecified site, unspecified rheumatoid factor presence ([facility]     ICD-10-CM: M06.9 ICD-9-CM: 714.0 Depression, unspecified depression type     ICD-10-CM: F32.9 ICD-9-CM: 194 Skin cancer, basal cell     ICD-10-CM: C44.91 
ICD-9-CM: 173.91 Vitals BP Pulse Temp Resp Height(growth percentile) Weight(growth percentile)  110/70 (BP 1 Location: Left arm, BP Patient Position: Sitting) 78 98.8 °F (37.1 °C) (Oral) 16 5' 5\" (1.651 m) 142 lb (64.4 kg) SpO2 BMI OB Status Smoking Status 99% 23.63 kg/m2 Having regular periods Never Smoker Vitals History BMI and BSA Data Body Mass Index Body Surface Area  
 23.63 kg/m 2 1.72 m 2 Your Updated Medication List  
  
   
This list is accurate as of 5/24/18  9:36 AM.  Always use your most recent med list.  
  
  
  
  
 ALIGN 10.5 mg (10 million cell) Aleyda Boston Generic drug:  Bifidobacterium infantis Take  by mouth. buPROPion  mg tablet Commonly known as:  [place] Take 1 Tab by mouth daily. levothyroxine 75 mcg tablet Commonly known as:  SYNTHROID Take 1 Tab by mouth Daily (before breakfast). multivitamin tablet Commonly known as:  ONE A DAY Take 1 Tab by mouth daily. naproxen 500 mg tablet Commonly known as:  NAPROSYN Take 500 mg by mouth two (2) times daily (with meals). OTHER(NON-FORMULARY) Take 1 Cap by mouth daily. Ritual Essential for Women vitamin PLAQUENIL 200 mg tablet Generic drug:  hydroxychloroquine Take 200 mg by mouth daily. psyllium husk (bulk) 100 % Powd  
by Does Not Apply route. 100 % Organic Powder -1/2 tsp daily TYLENOL 325 mg tablet Generic drug:  acetaminophen Take  by mouth every four (4) hours as needed. Prescriptions Printed Refills buPROPion XL (WELLBUTRIN XL) 150 mg tablet 1 Sig: Take 1 Tab by mouth daily. Class: Print Route: Oral  
 levothyroxine (SYNTHROID) 75 mcg tablet 1 Sig: Take 1 Tab by mouth Daily (before breakfast). Class: Print Route: Oral  
  
Follow-up Instructions Return in about 1 month (around 6/24/2018) for Hep B vaccine. Follow up in 6 months for routine care (non-fasting lab due prior). To-Do List   
 05/24/2018 Lab:  TSH 3RD GENERATION Patient Instructions Kartik Neuroma: Care Instructions Your Care Instructions When your toes are squeezed together, often over a period of months or even years, the nerve that runs between the toes can swell and get thicker. This is called a Siduh's neuroma. It may feel like a small lump is pushing inside the ball of your foot. When you walk or move your toes, you feel pain that sometimes moves into your toes. If the pressure continues, it may damage the nerve. If you catch the problem early and change your shoes, the nerve swelling may go away. Your doctor may advise you to wear wide-toed shoes. He or she also may suggest that you ice the sore spot and limit activities that put pressure on the nerve. If these steps do not help your symptoms, your doctor may have you use special pads or devices that spread the toes. This keeps them from squeezing the nerve. In some cases, you may get a cortisone shot to reduce swelling and pain. If these treatments don't help, your doctor may suggest surgery to relieve pressure or remove the swollen nerve. Follow-up care is a key part of your treatment and safety. Be sure to make and go to all appointments, and call your doctor if you are having problems. It's also a good idea to know your test results and keep a list of the medicines you take. How can you care for yourself at home? · Try to stay off your feet as much as possible until the pain and swelling go away. · Avoid wearing tight, pointy, or high-heeled shoes. Instead, wear roomy footwear. · Put ice or a cold pack on the area for 10 to 20 minutes at a time. Put a thin cloth between the ice and your skin. · Try massaging your feet. This relaxes the muscles around the nerve. · If your doctor prescribed special pads or a device to relieve pressure on your toes, use these items as directed. · Until all pain and swelling go away, avoid activities that put pressure on the toes. These include racquet sports and running. When should you call for help? Watch closely for changes in your health, and be sure to contact your doctor if: 
? · You do not get better as expected. Where can you learn more? Go to http://andrew-arcelia.info/. Enter E100 in the search box to learn more about \"Nahum's Neuroma: Care Instructions. \" Current as of: March 21, 2017 Content Version: 11.4 © 8555-5334 [employer]. Care instructions adapted under license by [employer] (which disclaims liability or warranty for this information). If you have questions about a medical condition or this instruction, always ask your healthcare professional. [address] any warranty or liability for your use of this information. Introducing [address]! Dear Lorrie 62: Thank you for requesting a Moovit account. Our records indicate that you already have an active Moovit account. You can access your account anytime at [email or web address] Did you know that you can access your hospital and ER discharge instructions at any time in Moovit? You can also review all of your test results from your hospital stay or ER visit. Additional Information If you have questions, please visit the Frequently Asked Questions section of the Moovit website at [email or web address] Remember, Moovit is NOT to be used for urgent needs. For medical emergencies, dial 911. Now available from your iPhone and Android! Please provide this summary of care documentation to your next provider. Your primary care clinician is listed as Deb Garcia. If you have any questions after today's visit, please call [phone].

## 2018-05-24 NOTE — PATIENT INSTRUCTIONS
Sidhu's Neuroma: Care Instructions  Your Care Instructions  When your toes are squeezed together, often over a period of months or even years, the nerve that runs between the toes can swell and get thicker. This is called a Sidhu's neuroma. It may feel like a small lump is pushing inside the ball of your foot. When you walk or move your toes, you feel pain that sometimes moves into your toes. If the pressure continues, it may damage the nerve. If you catch the problem early and change your shoes, the nerve swelling may go away. Your doctor may advise you to wear wide-toed shoes. He or she also may suggest that you ice the sore spot and limit activities that put pressure on the nerve. If these steps do not help your symptoms, your doctor may have you use special pads or devices that spread the toes. This keeps them from squeezing the nerve. In some cases, you may get a cortisone shot to reduce swelling and pain. If these treatments don't help, your doctor may suggest surgery to relieve pressure or remove the swollen nerve. Follow-up care is a key part of your treatment and safety. Be sure to make and go to all appointments, and call your doctor if you are having problems. It's also a good idea to know your test results and keep a list of the medicines you take. How can you care for yourself at home? · Try to stay off your feet as much as possible until the pain and swelling go away. · Avoid wearing tight, pointy, or high-heeled shoes. Instead, wear roomy footwear. · Put ice or a cold pack on the area for 10 to 20 minutes at a time. Put a thin cloth between the ice and your skin. · Try massaging your feet. This relaxes the muscles around the nerve. · If your doctor prescribed special pads or a device to relieve pressure on your toes, use these items as directed. · Until all pain and swelling go away, avoid activities that put pressure on the toes. These include racquet sports and running.   When should you call for help? Watch closely for changes in your health, and be sure to contact your doctor if:  ? · You do not get better as expected. Where can you learn more? Go to http://andrew-arcelia.info/. Enter E100 in the search box to learn more about \"Sidhu's Neuroma: Care Instructions. \"  Current as of: March 21, 2017  Content Version: 11.4  © 9159-1414 [employer]. Care instructions adapted under license by [employer] (which disclaims liability or warranty for this information). If you have questions about a medical condition or this instruction, always ask your healthcare professional. [address] any warranty or liability for your use of this information.

## 2018-05-24 NOTE — PROGRESS NOTES
1. Have you been to the ER, urgent care clinic since your last visit? Hospitalized since your last visit? No    2. Have you seen or consulted any other health care providers outside of the [address] [place] [address] since your last visit? Include any pap smears or colon screening.  Yes When: 05/18/2018 Where: Rheumatology Dr. Berrios Major Reason for visit: arthritis

## 2018-06-15 DIAGNOSIS — F32.A DEPRESSION, UNSPECIFIED DEPRESSION TYPE: ICD-10-CM

## 2018-06-15 RX ORDER — BUPROPION HYDROCHLORIDE 150 MG/1
TABLET ORAL
Qty: 90 TAB | Refills: 1 | Status: SHIPPED | OUTPATIENT
Start: 2018-06-15 | End: 2018-12-10 | Stop reason: SDUPTHER

## 2018-06-20 ENCOUNTER — CLINICAL SUPPORT (OUTPATIENT)
Dept: FAMILY MEDICINE CLINIC | Age: 48
End: 2018-06-20

## 2018-06-20 NOTE — MR AVS SNAPSHOT
[address] [facility] [address] [facility] [address] 
[phone] Patient: Shawanda Taylor MRN: [id number] :1970 Visit Information Date & Time Provider Department Dept. Phone Encounter #  
 2018 11:00 AM Ceasar Vick, Tyra3 [facility] [id number] Your Appointments 2018  2:00 PM  
Nurse Visit with MANDA Villafuerte [facility] ([facility]) Appt Note: 2nd hep b; to see Wirt Slice; 2nd Hep B / to see Wirt Slice [address] Critical access hospital [address] [facility] [address] [facility] [address]  
  
    
 2018  8:00 AM  
FOLLOW UP EXAM with Lucinda Arellano MD  
[facility] ([address]) Appt Note: routine f/u 6mo and 3rd hep b and a hep a  
 [address] [facility] [address] [facility] [address]  
[address] [facility] [address] [facility] [address] Upcoming Health Maintenance Date Due Influenza Age 5 to Adult 2018 BREAST CANCER SCRN MAMMOGRAM 2019 PAP AKA CERVICAL CYTOLOGY 2019 DTaP/Tdap/Td series (2 - Td) 2023 Allergies as of 2018  Review Complete On: 2018 By: Lucinda Arellano MD  
 No Known Allergies Current Immunizations  Reviewed on 2018 Name Date Hep A and Hep B Vaccine 2018 Influenza Vaccine 10/7/2016, 2015, 2013 Influenza Vaccine (Quad) PF 2017, 10/15/2015 Pneumococcal Polysaccharide (PPSV-23) 2014 Tdap 2013 Not reviewed this visit You Were Diagnosed With   
  
 Codes Comments ERRONEOUS ENCOUNTER--DISREGARD    -  Primary ICD-9-CM: 84692 Vitals OB Status Smoking Status Having regular periods Never Smoker Preferred Pharmacy Pharmacy Name Phone [facility] [address] [facility], [address] [facility] [phone] Your Updated Medication List  
  
   
This list is accurate as of 6/20/18 11:15 AM.  Always use your most recent med list.  
  
  
  
  
 ALIGN 10.5 mg (10 million cell) Celestine Traylor Generic drug:  Bifidobacterium infantis Take  by mouth. buPROPion  mg tablet Commonly known as:  WELLBUTRIN XL  
take 1 tablet by mouth once daily  
  
 levothyroxine 75 mcg tablet Commonly known as:  SYNTHROID Take 1 Tab by mouth Daily (before breakfast). multivitamin tablet Commonly known as:  ONE A DAY Take 1 Tab by mouth daily. naproxen 500 mg tablet Commonly known as:  NAPROSYN Take 500 mg by mouth two (2) times daily (with meals). OTHER(NON-FORMULARY) Take 1 Cap by mouth daily. Ritual Essential for Women vitamin PLAQUENIL 200 mg tablet Generic drug:  hydroxychloroquine Take 200 mg by mouth daily. psyllium husk (bulk) 100 % Powd  
by Does Not Apply route. 100 % Organic Powder -1/2 tsp daily TYLENOL 325 mg tablet Generic drug:  acetaminophen Take  by mouth every four (4) hours as needed. Introducing [facility]! Dear Osmar Schmid: Thank you for requesting a MENA [employer]PPORT[employer]IES account. Our records indicate that you already have an active ME[employer] account. You can access your account anytime at [email or web address] Did you know that you can access your hospital and ER discharge instructions at any time in MENA O[employer]? You can also review all of your test results from your hospital stay or ER visit. Additional Information If you have questions, please visit the Frequently Asked Questions section of the [employer] website at [email or web address] Remember, ME[employer] OPPORT[employer] is NOT to be used for urgent needs. For medical emergencies, dial 911. Now available from your iPhone and Android! Please provide this summary of care documentation to your next provider. Your primary care clinician is listed as Christina Reynoso. If you have any questions after today's visit, please call [phone].

## 2018-06-28 ENCOUNTER — CLINICAL SUPPORT (OUTPATIENT)
Dept: FAMILY MEDICINE CLINIC | Age: 48
End: 2018-06-28

## 2018-06-28 DIAGNOSIS — Z23 NEED FOR HEPATITIS B VACCINATION: Primary | ICD-10-CM

## 2018-06-28 NOTE — PROGRESS NOTES
Chief Complaint   Patient presents with    Immunization/Injection     Hep B vaccine # 2      Physician order obtained. Patient completed adult immunization consent form. Allergies, contraindications and recommendations reviewed with patient. Hep B vaccine administered IM right   deltoid. Patient tolerated well. Patient remained in office for 15 minutes after injection and no adverse reactions were noted.

## 2018-06-28 NOTE — PATIENT INSTRUCTIONS
Hepatitis B Vaccine: What You Need to Know  Why get vaccinated? Hepatitis B is a serious disease that affects the liver. It is caused by the hepatitis B virus. Hepatitis B can cause mild illness lasting a few weeks, or it can lead to a serious, lifelong illness. Hepatitis B virus infection can be either acute or chronic. Acute hepatitis B virus infection is a short-term illness that occurs within the first 6 months after someone is exposed to the hepatitis B virus. This can lead to:  · fever, fatigue, loss of appetite, nausea, and/or vomiting  · jaundice (yellow skin or eyes, dark urine, neslon-colored bowel movements)  · pain in muscles, joints, and stomach  Chronic hepatitis B virus infection is a long-term illness that occurs when the hepatitis B virus remains in a person's body. Most people who go on to develop chronic hepatitis B do not have symptoms, but it is still very serious and can lead to:  · liver damage (cirrhosis)  · liver cancer  · death  Chronically-infected people can spread hepatitis B virus to others, even if they do not feel or look sick themselves. Up to 1.4 million people in the [place] may have chronic hepatitis B infection. About 90% of infants who get hepatitis B become chronically infected and about 1 out of 4 of them dies. Hepatitis B is spread when blood, semen, or other body fluid infected with the Hepatitis B virus enters the body of a person who is not infected.  People can become infected with the virus through:  · Birth (a baby whose mother is infected can be infected at or after birth)  · Sharing items such as razors or toothbrushes with an infected person  · Contact with the blood or open sores of an infected person  · Sex with an infected partner  · Sharing needles, syringes, or other drug-injection equipment  · Exposure to blood from needlesticks or other sharp instruments  Each year about 2,000 people in the [facility] die from hepatitis B-related liver disease. Hepatitis B vaccine can prevent hepatitis B and its consequences, including liver cancer and cirrhosis. Hepatitis B vaccine  Hepatitis B vaccine is made from parts of the hepatitis B virus. It cannot cause hepatitis B infection. The vaccine is usually given as 3 or 4 shots over a 6-month period. Infants should get their first dose of hepatitis B vaccine at birth and will usually complete the series at 7 months of age. All children and adolescents younger than 23years of age who have not yet gotten the vaccine should also be vaccinated. Hepatitis B vaccine is recommended for unvaccinated adults who are at risk for hepatitis B virus infection, including:  · People whose sex partners have hepatitis  · Sexually active persons who are not in a long-term monogamous relationship  · Persons seeking evaluation or treatment for a sexually transmitted disease  · Men who have sexual contact with other men  · People who share needles, syringes, or other drug-injection equipment  · People who have household contact with someone infected with the hepatitis B virus  · Health care and public safety workers at risk for exposure to blood or body fluids  · Residents and staff of facilities for developmentally disabled persons  · Persons in correctional facilities  · Victims of sexual assault or abuse  · Travelers to regions with increased rates of hepatitis B  · People with chronic liver disease, kidney disease, HIV infection, or diabetes  · Anyone who wants to be protected from hepatitis B  There are no known risks to getting hepatitis B vaccine at the same time as other vaccines. Some people should not get this vaccine. Tell the person who is giving the vaccine:  · If the person getting the vaccine has any severe, life-threatening allergies.  If you ever had a life-threatening allergic reaction after a dose of hepatitis B vaccine, or have a severe allergy to any part of this vaccine, you may be advised not to get vaccinated. Ask your health care provider if you want information about vaccine components. · If the person getting the vaccine is not feeling well. If you have a mild illness, such as a cold, you can probably get the vaccine today. If you are moderately or severely ill, you should probably wait until you recover. Your doctor can advise you. Risks of a vaccine reaction  With any medicine, including vaccines, there is a chance of side effects. These are usually mild and go away on their own, but serious reactions are also possible. Most people who get hepatitis B vaccine do not have any problems with it. Minor problems following hepatitis B vaccine include:  · soreness where the shot was given  · temperature of 99.9°F or higher  If these problems occur, they usually begin soon after the shot and last 1 or 2 days. Your doctor can tell you more about these reactions. Other problems that could happen after this vaccine:  · People sometimes faint after a medical procedure, including vaccination. Sitting or lying down for about 15 minutes can help prevent fainting and injuries caused by a fall. Tell your provider if you feel dizzy, or have vision changes or ringing in the ears. · Some people get shoulder pain that can be more severe and longer-lasting than the more routine soreness that can follow injections. This happens very rarely. · Any medication can cause a severe allergic reaction. Such reactions from a vaccine are very rare, estimated at about 1 in a million doses, and would happen within a few minutes to a few hours after the vaccination. As with any medicine, there is a very remote chance of a vaccine causing a serious injury or death. The safety of vaccines is always being monitored. For more information, visit: www.cdc.gov/vaccinesafety/  What if there is a serious problem? What should I look for?   · Look for anything that concerns you, such as signs of a severe allergic reaction, very high fever, or unusual behavior. Signs of a severe allergic reaction can include hives, swelling of the face and throat, difficulty breathing, a fast heartbeat, dizziness, and weakness. These would usually start a few minutes to a few hours after the vaccination. What should I do? · If you think it is a severe allergic reaction or other emergency that can't wait, call 9-1-1 or get the person to the nearest hospital. Otherwise, call your clinic  Afterward, the reaction should be reported to the Vaccine Adverse Event Reporting System (VAERS). Your doctor should file this report, or you can do it yourself through the VAERS web site at www.vaers. [facility].gov, or by calling 0-220.874.9864. VAERS does not give medical advice. The National Vaccine Injury Compensation Program  The National Vaccine Injury Compensation Program (VICP) is a federal program that was created to compensate people who may have been injured by certain vaccines. Persons who believe they may have been injured by a vaccine can learn about the program and about filing a claim by calling 5-912.367.1727 or visiting the [employer] website at www.[facility].gov/vaccinecompensation. There is a time limit to file a claim for compensation. How can I learn more? · Ask your healthcare provider. He or she can give you the vaccine package insert or suggest other sources of information. · Call your local or state health department. · Contact the Centers for Disease Control and Prevention (CDC):  ¨ Call 5-529.847.1176 (1-800-CDC-INFO) or  ¨ Visit CDC's website at www.cdc.gov/vaccines  Vaccine Information Statement  Hepatitis B Vaccine  7/20/2016  42 U. S.C. § 300aa-26  U. S. Department of Health and Human Services  Centers for Disease Control and Prevention  Many Vaccine Information Statements are available in Arabic and other languages. See www.immunize.org/vis. Muchas hojas de información sobre vacunas están disponibles en español y en otros idiomas.  Visite www.immunize.org/vis. Care instructions adapted under license by [employer] (which disclaims liability or warranty for this information). If you have questions about a medical condition or this instruction, always ask your healthcare professional. [address] any warranty or liability for your use of this information.

## 2018-06-28 NOTE — MR AVS SNAPSHOT
[address] [facility] 200 [facility] 
[phone] Patient: Estefani Castillo MRN: [id number] :1970 Visit Information Date & Time Provider Department Dept. Phone Encounter #  
 2018  2:00 PM Alma Wilburn, [address] [id number] Follow-up Instructions Return in about 5 months (around 2018) for Hep A and Hep B combo vaccine. Your Appointments 2018  2:00 PM  
Nurse Visit with MANDA Frazier [address] [place] [address] ([facility]) Appt Note: 2nd hep b; to see Redia Mix; 2nd Hep B / to see Redia Mix [address] [facility] [address] [place] [address] [address] 200 [facility]  
  
    
 2018  8:00 AM  
FOLLOW UP EXAM with Maureen Hinkle MD  
[address] [place] [address] ([facility]) Appt Note: routine f/u 6mo and 3rd hep b and a hep a  
 [facility] [address] [facility] [address]  
[address] [facility] [address] [facility] [address] Upcoming Health Maintenance Date Due Influenza Age 5 to Adult 2018 BREAST CANCER SCRN MAMMOGRAM 2019 PAP AKA CERVICAL CYTOLOGY 2019 DTaP/Tdap/Td series (2 - Td) 2023 Allergies as of 2018  Review Complete On: 2018 By: Crys Murguia LPN No Known Allergies Current Immunizations  Reviewed on 2018 Name Date Hep A and Hep B Vaccine 2018 Hep B Vaccine (Adult) 2018 Influenza Vaccine 10/7/2016, 2015, 2013 Influenza Vaccine (Quad) PF 2017, 10/15/2015 Pneumococcal Polysaccharide (PPSV-23) 2014 Tdap 2013 Not reviewed this visit You Were Diagnosed With   
  
 Codes Comments Need for hepatitis B vaccination    -  Primary ICD-10-CM: C87 ICD-9-CM: V05.3 Vitals OB Status Smoking Status Having regular periods Never Smoker Preferred Pharmacy Pharmacy Name Phone [facility] [address] [phone] Your Updated Medication List  
  
   
This list is accurate as of 6/28/18  1:56 PM.  Always use your most recent med list.  
  
  
  
  
 ALIGN 10.5 mg (10 million cell) Bryant Spikes Generic drug:  Bifidobacterium infantis Take  by mouth. buPROPion  mg tablet Commonly known as:  WELLBUTRIN XL  
take 1 tablet by mouth once daily  
  
 levothyroxine 75 mcg tablet Commonly known as:  SYNTHROID Take 1 Tab by mouth Daily (before breakfast). multivitamin tablet Commonly known as:  ONE A DAY Take 1 Tab by mouth daily. naproxen 500 mg tablet Commonly known as:  NAPROSYN Take 500 mg by mouth two (2) times daily (with meals). OTHER(NON-FORMULARY) Take 1 Cap by mouth daily. Ritual Essential for Women vitamin PLAQUENIL 200 mg tablet Generic drug:  hydroxychloroquine Take 200 mg by mouth daily. psyllium husk (bulk) 100 % Powd  
by Does Not Apply route. 100 % Organic Powder -1/2 tsp daily TYLENOL 325 mg tablet Generic drug:  acetaminophen Take  by mouth every four (4) hours as needed. We Performed the Following HEPATITIS B VACCINE, ADULT DOSAGE, IM [56318 CPT(R)] VA IMMUNIZ ADMIN,1 SINGLE/COMB VAC/TOXOID [id number] CPT(R)] Follow-up Instructions Return in about 5 months (around 11/28/2018) for Hep A and Hep B combo vaccine. Patient Instructions Hepatitis B Vaccine: What You Need to Know Why get vaccinated? Hepatitis B is a serious disease that affects the liver. It is caused by the hepatitis B virus. Hepatitis B can cause mild illness lasting a few weeks, or it can lead to a serious, lifelong illness. Hepatitis B virus infection can be either acute or chronic. Acute hepatitis B virus infection is a short-term illness that occurs within the first 6 months after someone is exposed to the hepatitis B virus. This can lead to: 
· fever, fatigue, loss of appetite, nausea, and/or vomiting · jaundice (yellow skin or eyes, dark urine, nelson-colored bowel movements) · pain in muscles, joints, and stomach Chronic hepatitis B virus infection is a long-term illness that occurs when the hepatitis B virus remains in a person's body. Most people who go on to develop chronic hepatitis B do not have symptoms, but it is still very serious and can lead to: · liver damage (cirrhosis) · liver cancer · death Chronically-infected people can spread hepatitis B virus to others, even if they do not feel or look sick themselves. Up to 1.4 million people in the [place] may have chronic hepatitis B infection. About 90% of infants who get hepatitis B become chronically infected and about 1 out of 4 of them dies. Hepatitis B is spread when blood, semen, or other body fluid infected with the Hepatitis B virus enters the body of a person who is not infected. People can become infected with the virus through: · Birth (a baby whose mother is infected can be infected at or after birth) · Sharing items such as razors or toothbrushes with an infected person · Contact with the blood or open sores of an infected person · Sex with an infected partner · Sharing needles, syringes, or other drug-injection equipment · Exposure to blood from needlesticks or other sharp instruments Each year about 2,000 people in the [facility] die from hepatitis B-related liver disease. Hepatitis B vaccine can prevent hepatitis B and its consequences, including liver cancer and cirrhosis. Hepatitis B vaccine Hepatitis B vaccine is made from parts of the hepatitis B virus. It cannot cause hepatitis B infection. The vaccine is usually given as 3 or 4 shots over a 6-month period. Infants should get their first dose of hepatitis B vaccine at birth and will usually complete the series at 7 months of age. All children and adolescents younger than 23years of age who have not yet gotten the vaccine should also be vaccinated. Hepatitis B vaccine is recommended for unvaccinated adults who are at risk for hepatitis B virus infection, including: · People whose sex partners have hepatitis · Sexually active persons who are not in a long-term monogamous relationship · Persons seeking evaluation or treatment for a sexually transmitted disease · Men who have sexual contact with other men · People who share needles, syringes, or other drug-injection equipment · People who have household contact with someone infected with the hepatitis B virus · Health care and public safety workers at risk for exposure to blood or body fluids · Residents and staff of facilities for developmentally disabled persons · Persons in correctional facilities · Victims of sexual assault or abuse · Travelers to regions with increased rates of hepatitis B 
· People with chronic liver disease, kidney disease, HIV infection, or diabetes · Anyone who wants to be protected from hepatitis B There are no known risks to getting hepatitis B vaccine at the same time as other vaccines. Some people should not get this vaccine. Tell the person who is giving the vaccine: · If the person getting the vaccine has any severe, life-threatening allergies. If you ever had a life-threatening allergic reaction after a dose of hepatitis B vaccine, or have a severe allergy to any part of this vaccine, you may be advised not to get vaccinated. Ask your health care provider if you want information about vaccine components. · If the person getting the vaccine is not feeling well. If you have a mild illness, such as a cold, you can probably get the vaccine today.  If you are moderately or severely ill, you should probably wait until you recover. Your doctor can advise you. Risks of a vaccine reaction With any medicine, including vaccines, there is a chance of side effects. These are usually mild and go away on their own, but serious reactions are also possible. Most people who get hepatitis B vaccine do not have any problems with it. Minor problems following hepatitis B vaccine include: 
· soreness where the shot was given · temperature of 99.9°F or higher If these problems occur, they usually begin soon after the shot and last 1 or 2 days. Your doctor can tell you more about these reactions. Other problems that could happen after this vaccine: · People sometimes faint after a medical procedure, including vaccination. Sitting or lying down for about 15 minutes can help prevent fainting and injuries caused by a fall. Tell your provider if you feel dizzy, or have vision changes or ringing in the ears. · Some people get shoulder pain that can be more severe and longer-lasting than the more routine soreness that can follow injections. This happens very rarely. · Any medication can cause a severe allergic reaction. Such reactions from a vaccine are very rare, estimated at about 1 in a million doses, and would happen within a few minutes to a few hours after the vaccination. As with any medicine, there is a very remote chance of a vaccine causing a serious injury or death. The safety of vaccines is always being monitored. For more information, visit: www.cdc.gov/vaccinesafety/ What if there is a serious problem? What should I look for? · Look for anything that concerns you, such as signs of a severe allergic reaction, very high fever, or unusual behavior. Signs of a severe allergic reaction can include hives, swelling of the face and throat, difficulty breathing, a fast heartbeat, dizziness, and weakness. These would usually start a few minutes to a few hours after the vaccination. What should I do? · If you think it is a severe allergic reaction or other emergency that can't wait, call 9-1-1 or get the person to the nearest hospital. Otherwise, call your clinic Afterward, the reaction should be reported to the Vaccine Adverse Event Reporting System (VAERS). Your doctor should file this report, or you can do it yourself through the VAERS web site at www.vaers. [facility].gov, or by calling 4-920.923.5405. VAERS does not give medical advice. The National Vaccine Injury Compensation Program 
The National Vaccine Injury Compensation Program (VICP) is a federal program that was created to compensate people who may have been injured by certain vaccines. Persons who believe they may have been injured by a vaccine can learn about the program and about filing a claim by calling 3-864.679.9099 or visiting the [employer] website at www.[facility].gov/vaccinecompensation. There is a time limit to file a claim for compensation. How can I learn more? · Ask your healthcare provider. He or she can give you the vaccine package insert or suggest other sources of information. · Call your local or state health department. · Contact the Centers for Disease Control and Prevention (CDC): 
¨ Call 2-513.140.5060 (1-800-CDC-INFO) or ¨ Visit CDC's website at www.cdc.gov/vaccines Vaccine Information Statement Hepatitis B Vaccine 7/20/2016 
42 JUNE Ofe Josue 041PX-00 [place]. [place]. Department of Health and [employer] Centers for Disease Control and Prevention Many Vaccine Information Statements are available in Beninese and other languages. See www.immunize.org/vis. Muchas hojas de información sobre vacunas están disponibles en español y en otros idiomas. Visite www.immunize.org/vis. Care instructions adapted under license by [employer] (which disclaims liability or warranty for this information).  If you have questions about a medical condition or this instruction, always ask your healthcare professional. [address] any warranty or liability for your use of this information. Introducing [facility]! Dear Wade Garcia: Thank you for requesting a [employer] account. Our records indicate that you already have an active [employer] account. You can access your account anytime at [email or web address] Did you know that you can access your hospital and ER discharge instructions at any time in [employer]? You can also review all of your test results from your hospital stay or ER visit. Additional Information If you have questions, please visit the Frequently Asked Questions section of the [employer] website at [email or web address] Remember, [employer] is NOT to be used for urgent needs. For medical emergencies, dial 911. Now available from your iPhone and Android! Please provide this summary of care documentation to your next provider. Your primary care clinician is listed as Raghavendra Aj. If you have any questions after today's visit, please call [phone].

## 2018-08-30 ENCOUNTER — PATIENT MESSAGE (OUTPATIENT)
Dept: FAMILY MEDICINE CLINIC | Age: 48
End: 2018-08-30

## 2018-08-30 RX ORDER — AZITHROMYCIN 250 MG/1
TABLET, FILM COATED ORAL
Qty: 6 TAB | Refills: 0 | Status: SHIPPED | OUTPATIENT
Start: 2018-08-30 | End: 2018-09-04

## 2018-08-30 NOTE — TELEPHONE ENCOUNTER
Had a sore throat Monday that then lead to nasal congestion. Has a mild earache. Has sinus pressure. Taking Advil / sudafed type product with relief. Has not felt febrile since Monday. No chest pain or cough. Symptoms overall are not improving. Since she is on Plaquenil we will send in a Z-perez to her pharmacy. She will follow-up if worsens or persists.

## 2018-08-30 NOTE — TELEPHONE ENCOUNTER
Caridad Smith LPN 2/68/3478 53:43 AM EDT        ----- Message -----   From: Andrew Palacios   Sent: 8/30/2018 10:47 AM   To: [facility] Nurse Pool  Subject: RE: Non-Urgent Medical Question     I can not make that today. I am working today because I have no one to cover for me. How about tomorrow? I know Dr. Hari Lyons is off on Fridays. Is there anything that can be done without me making an appointment? If not I understand.  ----- Message -----  From: Adalgisa Rodriguez  Sent: 8/30/2018 10:22 AM EDT  To: Saran Thurston  Subject: RE: Non-Urgent Medical Question    Good morning Jennifer, I have scheduled for 3:30 pm this afternoon. Can you make that time?    ----- Message -----   From: Saran Bojorquezher Deepak   Sent: 8/30/2018 8:59 AM EDT   To: Mariel Tenorio MD  Subject: Non-Urgent Medical Question    Good Morning, I have a pretty bad head cold. Started with a sore throat which has gone away thankfully. I now have really bad congestion, headache, fatigue and cough. I have taken a combination Ibuprofen 200mg/Pseudoephedrine 30 mg. This does help but causes me to cough due to drying everything out. Would you suggest that I come in to see someone? This started on Monday. I know with RA that my body tends to catch and react to things differently.

## 2018-11-16 RX ORDER — LEVOTHYROXINE SODIUM 75 UG/1
TABLET ORAL
Qty: 30 TAB | Refills: 5 | Status: SHIPPED | OUTPATIENT
Start: 2018-11-16 | End: 2019-03-18 | Stop reason: SDUPTHER

## 2018-12-10 ENCOUNTER — OFFICE VISIT (OUTPATIENT)
Dept: FAMILY MEDICINE CLINIC | Age: 48
End: 2018-12-10

## 2018-12-10 VITALS
TEMPERATURE: 98.5 F | WEIGHT: 142 LBS | BODY MASS INDEX: 23.66 KG/M2 | DIASTOLIC BLOOD PRESSURE: 76 MMHG | HEIGHT: 65 IN | HEART RATE: 77 BPM | SYSTOLIC BLOOD PRESSURE: 114 MMHG | OXYGEN SATURATION: 98 % | RESPIRATION RATE: 16 BRPM

## 2018-12-10 DIAGNOSIS — G57.62 MORTON'S NEUROMA OF LEFT FOOT: ICD-10-CM

## 2018-12-10 DIAGNOSIS — M06.9 RHEUMATOID ARTHRITIS, INVOLVING UNSPECIFIED SITE, UNSPECIFIED RHEUMATOID FACTOR PRESENCE: ICD-10-CM

## 2018-12-10 DIAGNOSIS — Z12.11 COLON CANCER SCREENING: ICD-10-CM

## 2018-12-10 DIAGNOSIS — Z23 ENCOUNTER FOR IMMUNIZATION: ICD-10-CM

## 2018-12-10 DIAGNOSIS — F32.A DEPRESSION, UNSPECIFIED DEPRESSION TYPE: ICD-10-CM

## 2018-12-10 DIAGNOSIS — E03.9 ACQUIRED HYPOTHYROIDISM: Primary | ICD-10-CM

## 2018-12-10 DIAGNOSIS — C44.91 SKIN CANCER, BASAL CELL: ICD-10-CM

## 2018-12-10 RX ORDER — BUPROPION HYDROCHLORIDE 150 MG/1
TABLET ORAL
Qty: 90 TAB | Refills: 1 | Status: SHIPPED | OUTPATIENT
Start: 2018-12-10 | End: 2019-06-28 | Stop reason: SDUPTHER

## 2018-12-10 NOTE — PROGRESS NOTES
Chief Complaint   Patient presents with    Arthritis     rheumatoid    Depression    Migraine    Other     h/o skin cancer    Thyroid Problem     Subjective:   46 y.o. female for follow-up    TSH is currently normal on synthroid 75mcg daily (slightly above 4). Overall feeling well. She is on Wellbutrin for depression which she reports as well-controlled. Has RA and sees rheumatology regularly and has no complaints. Sees eye doctor every 6 months due to being on plaquenil. She has labs with them regularly. She has had a few moles removed at [place] due to her family history of melanoma in her sister two of which showed basal cell cancer. She is seeing dermatology regularly but is a bit overdue. She is planning on going back in January. Has had a left forefoot pain diagnosed as a Sidhu's neuroma. Has some numbness and pain in the interspace. She has had an injection with podiatry. Curious about a second opinion. Objective: The patient appears well, alert, oriented x 3, in no distress. Visit Vitals  /76 (BP 1 Location: Left arm, BP Patient Position: Sitting)   Pulse 77   Temp 98.5 °F (36.9 °C) (Oral)   Resp 16   Ht 5' 5\" (1.651 m)   Wt 142 lb (64.4 kg)   SpO2 98%   BMI 23.63 kg/m²     ENT normal.  No adenopathy or thyromegaly. Chest: Clear, no w/r/. Heart:  Normal S1S2, RRR, no murmurs. Psych: normal affect. Mood good. Oriented x 3. Judgement and insight intact. Labs from Rheum reviewed and scanned. Assessment/Plan:       ICD-10-CM ICD-9-CM    1. Acquired hypothyroidism E03.9 244.9 TSH 3RD GENERATION   2. Depression, unspecified depression type F32.9 311 buPROPion XL (WELLBUTRIN XL) 150 mg tablet   3. Rheumatoid arthritis, involving unspecified site, unspecified rheumatoid factor presence (HCC) M06.9 714.0    4. Skin cancer, basal cell C44.91 173.91    5. 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  Colon cancer screening Z12.11 V76.51 REFERRAL TO GASTROENTEROLOGY   7. Encounter for immunization Z23 V03.89 HEPATITIS A AND HEPATITIS B (HEPA-HEPB), ADULT DOSAGE, IM     Reviewed labs. Hypothyroidism - autoimmune component. TSH on high side of normal. Cont current dose of Synthroid. Cont synthroid 75mcg qam.  Check TSH in 6 months. Depression - continue Wellbutrin XL 150mg daily as this is working well. RA - Continue rheumatology follow-up. Also to keep regular follow-up with eye doctor since on Plaquenil. Labs per rheum. Reviewed notes. Basal cell cancer with a family history of melanoma - cont regular derm exams. Left burrows's neuroma - refer to Dr. Shivam Torres. Refer for CRCS. HepA/HepB vaccine today. 25 minutes of face to face time spent with the patient with at least 50% on counseling on above medical issues. All chart history elements were reviewed by me at the time of the visit even though marked at time of note closure. Patient understands our medical plan. Patient has provided input and agrees with goals. Alternatives have been explained and offered. All questions answered. The patient is to call if condition worsens or fails to improve. Follow-up Disposition:  Return in about 6 months (around 6/10/2019) for routine care and please have non-fasting 3-7 days prior.

## 2018-12-10 NOTE — PROGRESS NOTES
1. Have you been to the ER, urgent care clinic since your last visit? Hospitalized since your last visit? No    2. Have you seen or consulted any other health care providers outside of the [address] [place] [address] since your last visit? Include any pap smears or colon screening.  Yes When: 9-  Where: Dr. Ni Arrieta  Reason for visit: routine visit and blood work     Rheumatology follow up 05/17/2019

## 2018-12-10 NOTE — PATIENT INSTRUCTIONS
Sidhu's Neuroma: Care Instructions  Your Care Instructions  When your toes are squeezed together, often over a period of months or even years, the nerve that runs between the toes can swell and get thicker. This is called a Sidhu's neuroma. It may feel like a small lump is pushing inside the ball of your foot. When you walk or move your toes, you feel pain that sometimes moves into your toes. If the pressure continues, it may damage the nerve. If you catch the problem early and change your shoes, the nerve swelling may go away. Your doctor may advise you to wear wide-toed shoes. He or she also may suggest that you ice the sore spot and limit activities that put pressure on the nerve. If these steps do not help your symptoms, your doctor may have you use special pads or devices that spread the toes. This keeps them from squeezing the nerve. In some cases, you may get a cortisone shot to reduce swelling and pain. If these treatments don't help, your doctor may suggest surgery to relieve pressure or remove the swollen nerve. Follow-up care is a key part of your treatment and safety. Be sure to make and go to all appointments, and call your doctor if you are having problems. It's also a good idea to know your test results and keep a list of the medicines you take. How can you care for yourself at home? · Ask your doctor if you can take an over-the-counter pain medicine, such as acetaminophen (Tylenol), ibuprofen (Advil, Motrin), or naproxen (Aleve). Be safe with medicines. Read and follow all instructions on the label. · Try to stay off your feet as much as possible until the pain and swelling go away. · Avoid wearing tight, pointy, or high-heeled shoes. Instead, wear roomy footwear. · Put ice or a cold pack on the area for 10 to 20 minutes at a time. Put a thin cloth between the ice and your skin. · Try massaging your feet. This relaxes the muscles around the nerve.   · If your doctor prescribed special pads or a device to relieve pressure on your toes, use these items as directed. · Until all pain and swelling go away, avoid activities that put pressure on the toes. These include racquet sports and running. When should you call for help? Watch closely for changes in your health, and be sure to contact your doctor if:    · You do not get better as expected. Where can you learn more? Go to http://andrew-arcelia.info/. Enter E100 in the search box to learn more about \"Sidhu's Neuroma: Care Instructions. \"  Current as of: November 29, 2017  Content Version: 11.8  © 3261-1374 [employer]. Care instructions adapted under license by [employer] (which disclaims liability or warranty for this information). If you have questions about a medical condition or this instruction, always ask your healthcare professional. [address] any warranty or liability for your use of this information. Follow up in 6 months for routine care and please have non-fasting 3-7 days prior  Vaccine Information Statement    Hepatitis A Vaccine: What You Need to Know    Many Vaccine Information Statements are available in Samoan and other languages. See www.immunize.org/vis. Hojas de información Sobre Vacunas están disponibles en español y en muchos otros idiomas. Visite www.immunize.org/vis    1. Why get vaccinated? Hepatitis A is a serious liver disease. It is caused by the hepatitis A virus (HAV). HAV is spread from person to person through contact with the feces (stool) of people who are infected, which can easily happen if someone does not wash his or her hands properly. You can also get hepatitis A from food, water, or objects contaminated with HAV.     Symptoms of hepatitis A can include:   fever, fatigue, loss of appetite, nausea, vomiting, and/or joint pain   severe stomach pains and diarrhea (mainly in children), or   jaundice (yellow skin or eyes, dark urine, nelson-colored bowel movements). These symptoms usually appear 2 to 6 weeks after exposure and usually last less than 2 months, although some people can be ill for as long as 6 months. If you have hepatitis A you may be too ill to work. Children often do not have symptoms, but most adults do. You can spread HAV without having symptoms. Hepatitis A can cause liver failure and death, although this is rare and occurs more commonly in persons 48years of age or older and persons with other liver diseases, such as hepatitis B or C. Hepatitis A vaccine can prevent hepatitis A. Hepatitis A vaccines were recommended in the [facility] beginning in 1996. Since then, the number of cases reported each year in the [place] has dropped from around 31,000 cases to fewer than 1,500 cases. 2. Hepatitis A vaccine    Hepatitis A vaccine is an inactivated (killed) vaccine. You will need 2 doses for long-lasting protection. These doses should be given at least 6 months apart. Children are routinely vaccinated between their first and second birthdays (15 through 22 months of age). Older children and adolescents can get the vaccine after 23 months. Adults who have not been vaccinated previously and want to be protected against hepatitis A can also get the vaccine. You should get hepatitis A vaccine if you:   are traveling to countries where hepatitis A is common,   are a man who has sex with other men,   use illegal drugs,   have a chronic liver disease such as hepatitis B or hepatitis C,   are being treated with clotting-factor concentrates,    work with hepatitis A-infected animals or in a hepatitis A research laboratory, or   expect to have close personal contact with an international adoptee from a country where hepatitis A is common    Ask your healthcare provider if you want more information about any of these groups.     There are no known risks to getting hepatitis A vaccine at the same time as other vaccines. 3. Some people should not get this vaccine     Tell the person who is giving you the vaccine:     If you have any severe, life-threatening allergies. If you ever had a life-threatening allergic reaction after a dose of hepatitis A vaccine, or have a severe allergy to any part of this vaccine, you may be advised not to get vaccinated. Ask your health care provider if you want information about vaccine components.  If you are not feeling well. If you have a mild illness, such as a cold, you can probably get the vaccine today. If you are moderately or severely ill, you should probably wait until you recover. Your doctor can advise you. 4. Risks of a vaccine reaction    With any medicine, including vaccines, there is a chance of side effects. These are usually mild and go away on their own, but serious reactions are also possible. Most people who get hepatitis A vaccine do not have any problems with it. Minor problems following hepatitis A vaccine include:   soreness or redness where the shot was given   low-grade fever   headache    tiredness   If these problems occur, they usually begin soon after the shot and last 1 or 2 days. Your doctor can tell you more about these reactions. Other problems that could happen after this vaccine:     People sometimes faint after a medical procedure, including vaccination. Sitting or lying down for about 15 minutes can help prevent fainting, and injuries caused by a fall. Tell your provider if you feel dizzy, or have vision changes or ringing in the ears.  Some people get shoulder pain that can be more severe and longer lasting than the more routine soreness that can follow injections. This happens very rarely.  Any medication can cause a severe allergic reaction.  Such reactions from a vaccine are very rare, estimated at about 1 in a million doses, and would happen within a few minutes to a few hours after the vaccination. As with any medicine, there is a very remote chance of a vaccine causing a serious injury or death. The safety of vaccines is always being monitored. For more information, visit: www.cdc.gov/vaccinesafety/    5. What if there is a serious problem? What should I look for?  Look for anything that concerns you, such as signs of a severe allergic reaction, very high fever, or unusual behavior. Signs of a severe allergic reaction can include hives, swelling of the face and throat, difficulty breathing, a fast heartbeat, dizziness, and weakness. These would usually start a few minutes to a few hours after the vaccination. What should I do?  If you think it is a severe allergic reaction or other emergency that cant wait, call 9-1-1 and get to the nearest hospital. Otherwise, call your clinic. Afterward, the reaction should be reported to the Vaccine Adverse Event Reporting System (VAERS). Your doctor should file this report, or you can do it yourself through the VAERS web site at www.vaers. hhs.gov, or by calling 8-946.418.8813. VAERS does not give medical advice. 6. The National Vaccine Injury Compensation Program    The [facility] Vaccine Injury Compensation Program (VICP) is a federal program that was created to compensate people who may have been injured by certain vaccines. Persons who believe they may have been injured by a vaccine can learn about the program and about filing a claim by calling 4-650.987.6032 or visiting the [facility] [employer] website at www.[facility].gov/vaccinecompensation. There is a time limit to file a claim for compensation. 7. How can I learn more?  Ask your healthcare provider. He or she can give you the vaccine package insert or suggest other sources of information.  Call your local or state health department.    Contact the Centers for Disease Control and Prevention (CDC):  - Call 2-940.351.1354 (9-341-VMT-INFO) or  - Visit CDCs website at www.cdc.gov/vaccines    Vaccine Information Statement  Hepatitis A Vaccine  7/20/2016  42 U. S.C. § 300aa-26    [place]. S. Department of Health and Human Services  Centers for Disease Control and Prevention    Office Use Only  Vaccine Information Statement     Hepatitis B Vaccine: What You Need to Know    Many Vaccine Information Statements are available in Irish and other languages. See www.immunize.org/vis. Hojas de información sobre vacunas están disponibles en español y en muchos otros idiomas. Visite www.immunize.org/vis    1. Why get vaccinated? Hepatitis B is a serious disease that affects the liver. It is caused by the hepatitis B virus. Hepatitis B can cause mild illness lasting a few weeks, or it can lead to a serious, lifelong illness. Hepatitis B virus infection can be either acute or chronic. Acute hepatitis B virus infection is a short-term illness that occurs within the first 6 months after someone is exposed to the hepatitis B virus. This can lead to:   fever, fatigue, loss of appetite, nausea, and/or vomiting   jaundice (yellow skin or eyes, dark urine, nelson-colored bowel movements)   pain in muscles, joints, and stomach    Chronic hepatitis B virus infection is a long-term illness that occurs when the hepatitis B virus remains in a persons body. Most people who go on to develop chronic hepatitis B do not have symptoms, but it is still very serious and can lead to:   liver damage (cirrhosis)   liver cancer   death    Chronically-infected people can spread hepatitis B virus to others, even if they do not feel or look sick themselves. Up to 1.4 million people in the [place] may have chronic hepatitis B infection. About 90% of infants who get hepatitis B become chronically infected and about 1 out of 4 of them dies. Hepatitis B is spread when blood, semen, or other body fluid infected with the Hepatitis B virus enters the body of a person who is not infected.  People can become infected with the virus through:  [employer] (a baby whose mother is infected can be infected at or after birth)   Sharing items such as razors or toothbrushes with an infected person   Contact with the blood or open sores of an infected person   Sex with an infected partner   Sharing needles, syringes, or other drug-injection equipment   Exposure to blood from needlesticks or other sharp instruments    Each year about 2,000 people in the [facility] die from hepatitis B-related liver disease. Hepatitis B vaccine can prevent hepatitis B and its consequences, including liver cancer and cirrhosis. 2. Hepatitis B vaccine    Hepatitis B vaccine is made from parts of the hepatitis B virus. It cannot cause hepatitis B infection. The vaccine is usually given as 2, 3, or 4 shots over 1 to 6 months. Infants should get their first dose of hepatitis B vaccine at birth and will usually complete the series at 7 months of age. All children and adolescents younger than 23years of age who have not yet gotten the vaccine should also be vaccinated.      Hepatitis B vaccine is recommended for unvaccinated adults who are at risk for hepatitis B virus infection, including:   People whose sex partners have hepatitis B   Sexually active persons who are not in a long-term monogamous relationship   Persons seeking evaluation or treatment for a sexually transmitted disease   Men who have sexual contact with other men   People who share needles, syringes, or other drug-injection equipment   People who have household contact with someone infected with the hepatitis B virus  [address] care and public safety workers at risk for exposure to blood or body fluids    Residents and staff of facilities for developmentally disabled persons   Persons in correctional facilities   Victims of sexual assault or abuse   Travelers to regions with increased rates of hepatitis B   People with chronic liver disease, kidney disease, HIV infection, or diabetes   Anyone who wants to be protected from hepatitis B     There are no known risks to getting hepatitis B vaccine at the same time as other vaccines. 3. Some people should not get this vaccine. Tell the person who is giving the vaccine:     If the person getting the vaccine has any severe, life-threatening allergies. If you ever had a life-threatening allergic reaction after a dose of hepatitis B vaccine, or have a severe allergy to any part of this vaccine, you may be advised not to get vaccinated. Ask your health care provider if you want information about vaccine components.  If the person getting the vaccine is not feeling well. If you have a mild illness, such as a cold, you can probably get the vaccine today. If you are moderately or severely ill, you should probably wait until you recover. Your doctor can advise you. 4. Risks of a vaccine reaction    With any medicine, including vaccines, there is a chance of side effects. These are usually mild and go away on their own, but serious reactions are also possible. Most people who get hepatitis B vaccine do not have any problems with it. Minor problems following hepatitis B vaccine include:    soreness where the shot was given   temperature of 99.9°F or higher  If these problems occur, they usually begin soon after the shot and last 1 or 2 days. Your doctor can tell you more about these reactions. Other problems that could happen after this vaccine:     People sometimes faint after a medical procedure, including vaccination. Sitting or lying down for about 15 minutes can help prevent fainting and injuries caused by a fall. Tell your provider if you feel dizzy, or have vision changes or ringing in the ears.  Some people get shoulder pain that can be more severe and longer-lasting than the more routine soreness that can follow injections. This happens very rarely.      Any medication can cause a severe allergic reaction. Such reactions from a vaccine are very rare, estimated at about 1 in a million doses, and would happen within a few minutes to a few hours after the vaccination. As with any medicine, there is a very remote chance of a vaccine causing a serious injury or death. The safety of vaccines is always being monitored. For more information, visit: www.cdc.gov/vaccinesafety/    5. What if there is a serious problem? What should I look for?  Look for anything that concerns you, such as signs of a severe allergic reaction, very high fever, or unusual behavior. Signs of a severe allergic reaction can include hives, swelling of the face and throat, difficulty breathing, a fast heartbeat, dizziness, and weakness. These would usually start a few minutes to a few hours after the vaccination. What should I do?  If you think it is a severe allergic reaction or other emergency that cant wait, call 9-1-1 and get to the nearest hospital. Otherwise, call your clinic. Afterward, the reaction should be reported to the Vaccine Adverse Event Reporting System (VAERS). Your doctor should file this report, or you can do it yourself through the VAERS web site at www.vaers. hhs.gov, or by calling 3-650.202.4702. ???? does not give medical advice. 6. The National Vaccine Injury Compensation Program    The [facility] Vaccine Injury Compensation Program (VICP) is a federal program that was created to compensate people who may have been injured by certain vaccines. Persons who believe they may have been injured by a vaccine can learn about the program and about filing a claim by calling 9-874.282.6782 or visiting the [employer] website at www.[facility].gov/vaccinecompensation. There is a time limit to file a claim for compensation. 7. How can I learn more?  Ask your healthcare provider. He or she can give you the vaccine package insert or suggest other sources of information.    Call your local or state health department.  Contact the Centers for Disease Control and Prevention (CDC):  - Call 3-590.142.5127 (1-800-CDC-INFO) or  - Visit CDCs website at www.cdc.gov/vaccines    Vaccine Information Statement   Hepatitis B Vaccine  10/12/2018  42 U. S.C. § 300aa-26    [place]  Department of Health and Human Services  Centers for Disease Control and Prevention    Office Use Only

## 2018-12-10 NOTE — PROGRESS NOTES
Twinrix 0.5 ml given IM in left deltoid. Lot # [id number], exp date 07/03/2020. Patient tolerated injection well. No adverse reaction noted.

## 2018-12-17 ENCOUNTER — OFFICE VISIT (OUTPATIENT)
Dept: ORTHOPEDIC SURGERY | Age: 48
End: 2018-12-17

## 2018-12-17 VITALS
WEIGHT: 141 LBS | RESPIRATION RATE: 16 BRPM | HEART RATE: 62 BPM | HEIGHT: 65 IN | TEMPERATURE: 97.6 F | SYSTOLIC BLOOD PRESSURE: 132 MMHG | OXYGEN SATURATION: 100 % | BODY MASS INDEX: 23.49 KG/M2 | DIASTOLIC BLOOD PRESSURE: 84 MMHG

## 2018-12-17 DIAGNOSIS — G57.82 NEUROMA DIGITAL NERVE, LEFT: Primary | ICD-10-CM

## 2018-12-17 DIAGNOSIS — M79.672 LEFT FOOT PAIN: ICD-10-CM

## 2018-12-17 NOTE — PATIENT INSTRUCTIONS
Please follow up after MRI. You are advised to contact us if your condition worsens. An MRI or CT has been ordered for you. A [employer] will be contacting you to schedule the appointment as soon as it has been approved with your insurance company. Please schedule an appointment to follow up with the doctor or the physicians assistant after the MRI or CT has been conducted. You have been provided with an order for durable medical equipment that you may  at an outside facility as our office does not carry the equipment you need. You may pick it up at any medical supply company you like. Listed below are a few different locations for your convenience:    [address]  [address], [address]  Phone: [phone] [address] [facility],[address]  [address]  Phone: [phone] [employer]  Phone: [phone]  [place]:   [address] [facility], [address] [place]:  Sadia [address] [facility] [address]  [place]:  [facility] [place], [address]      Foot Pain: Care Instructions  Your Care Instructions  Foot injuries that cause pain and swelling are fairly common. Almost all sports or home repair projects can cause a misstep that ends up as foot pain. Normal wear and tear, especially as you get older, also can cause foot pain. Most minor foot injuries will heal on their own, and home treatment is usually all you need to do. If you have a severe injury, you may need tests and treatment. Follow-up care is a key part of your treatment and safety. Be sure to make and go to all appointments, and call your doctor if you are having problems. It's also a good idea to know your test results and keep a list of the medicines you take. How can you care for yourself at home? · Take pain medicines exactly as directed.   ? If the doctor gave you a prescription medicine for pain, take it as prescribed. ? If you are not taking a prescription pain medicine, ask your doctor if you can take an over-the-counter medicine. · Rest and protect your foot. Take a break from any activity that may cause pain. · Put ice or a cold pack on your foot for 10 to 20 minutes at a time. Put a thin cloth between the ice and your skin. · Prop up the sore foot on a pillow when you ice it or anytime you sit or lie down during the next 3 days. Try to keep it above the level of your heart. This will help reduce swelling. · Your doctor may recommend that you wrap your foot with an elastic bandage. Keep your foot wrapped for as long as your doctor advises. · If your doctor recommends crutches, use them as directed. · Wear roomy footwear. · As soon as pain and swelling end, begin gentle exercises of your foot. Your doctor can tell you which exercises will help. When should you call for help? Call 911 anytime you think you may need emergency care. For example, call if:    · Your foot turns pale, white, blue, or cold.    Call your doctor now or seek immediate medical care if:    · You cannot move or stand on your foot.     · Your foot looks twisted or out of its normal position.     · Your foot is not stable when you step down.     · You have signs of infection, such as:  ? Increased pain, swelling, warmth, or redness. ? Red streaks leading from the sore area. ? Pus draining from a place on your foot. ? A fever.     · Your foot is numb or tingly.    Watch closely for changes in your health, and be sure to contact your doctor if:    · You do not get better as expected.     · You have bruises from an injury that last longer than 2 weeks. Where can you learn more? Go to http://andrew-arcelia.info/. Enter Q450 in the search box to learn more about \"Foot Pain: Care Instructions. \"  Current as of: November 29, 2017  Content Version: 11.8  © 9723-1372 [employer]. Care instructions adapted under license by [employer] (which disclaims liability or warranty for this information). If you have questions about a medical condition or this instruction, always ask your healthcare professional. [address] any warranty or liability for your use of this information.

## 2018-12-17 NOTE — PROGRESS NOTES
AMBULATORY PROGRESS NOTE      Patient: Trent Rasheed             MRN: [id number]     SSN: [id number] Body mass index is 23.46 kg/m². YOB: 1970     AGE: 46 y.o. EX: female    PCP: Max Claudio MD     IMPRESSION/DIAGNOSIS AND TREATMENT PLAN     DIAGNOSES  1. Neuroma digital nerve, left    2. Left foot pain        Orders Placed This Encounter    AMB SUPPLY ORDER    [52023] Foot Min 3V    MRI FOOT LT WO CONT      Trent Rasheed understands her diagnoses and the proposed plan. As such, in this individual who has pain and discomfort in her forefoot, her history is classic for plantar interdigital webspace neuralgia, for which she may have a neuroma in her third webspace. Her examination is listed as below. DIAGNOSTIC STUDIES:  X-rays of her left foot, three views, AP, lateral, and oblique, at [facility], nonweightbearing, show pes cavovarus type alignment, varus of the number two and three toes without instability on these nonweightbearing films. No fracture, subluxation, or dislocation. No osteolytic or osteoblastic lesions are seen. She is quite adamant that she would like to try conservative care, which I do agree with her, no surgery unless she is having worsening pain and discomfort, that is the only indication for surgery. The plan is listed as below. Plan:    1) MRI without IV Contrast of the left forefoot. 2) DME Order: Please add a metatarsal pad to the patient's current orthotic inserts to offload left forefoot (TPC). 3) Continue activity modification as directed. RTO - after MRI     HPI AND EXAMINATION     Trent Rasheed IS A 46 y.o. female who presents to my outpatient office complaining of left foot pain. Ms. Lamar Joshua states that she has been experiencing pain in her left foot near the 3rd web space for multiple years that has been progressively worsening. She states that she had this pain even prior to being diagnosed with RA.  She reports that  Kalli Whitehead believes she has a Sidhu's neuroma, but this is not confirmed. She states that she saw a podiatrist in January or February 2018 and received a Cortisone injection. She notes that the injection helped with her pain overall, but did not help with \"situation pain\" that occurs when she is walking. She was also given orthotic inserts with metatarsal pads by the podiatrist. She denies any burning or searing pain. She notes that as she sits here now in the exam room, she has no pain. She describes her pain as \"situational\" and unpredictable. She notes that she experiences intermittent, sudden sharp pain in her left forefoot. She finds that close toed shoes seem to worsen her pain. Ms. Hortencia Thao notes that she is able to walk first thing in the morning in slippers with no pain. XR imaging has been reviewed with the patient. The patient denies h/o back issues. The patient has h/o RA    Visit Vitals  /84   Pulse 62   Temp 97.6 °F (36.4 °C) (Oral)   Resp 16   Ht 5' 5\" (1.651 m)   Wt 141 lb (64 kg)   SpO2 100%   BMI 23.46 kg/m²      ANKLE/FOOT left    Psychiatry: Alert, Oriented x 3; Speech normal in context and clarity,            Memory intact grossly, no involuntary movements - tremors, no dementia  Gait: Normal  Tenderness: No tenderness to palpation at this time. Cutaneous: Deviated 3rd web space. Joint Motion: WNL. Joint / Tendon Stability: No Ankle or Subtalar instability or joint laxity. No peroneal sublux ability or dislocation  Alignment: Midfoot, Hindfoot WNL. Elevated 5th toe , #4 toe looks similar, bilaterally    Increased #3 web space, L>R    #2,3 toes slight varus alignment. Neuro Motor/Sensory: NL/NL. Normal monofilament testing. Vascular: NL foot/ankle pulses. Lymphatics: No extremity lymphedema, No calf swelling, no tenderness to calf muscles.     CHART REVIEW     Past Medical History:   Diagnosis Date    Arthritis     Basal cell carcinoma of skin 2017 sees derm q6 months    Hypothyroid     Migraine 1/4/2010    Positive MARLI (antinuclear antibody) 5/2013    [facility]he[facility])     working diagnosis so far, sees Dr. Kristin Marcial     Current Outpatient Medications   Medication Sig    buPROPion XL (WELLBUTRIN XL) 150 mg tablet take 1 tablet by mouth once daily    levothyroxine (SYNTHROID) 75 mcg tablet take 1 tablet by mouth daily before BREAKFAST    Bifidobacterium infantis (ALIGN) 10.5 mg (10 million cell) chew Take  by mouth.  psyllium husk, bulk, 100 % powd by Does Not Apply route. 100 % Organic Powder -1/2 tsp daily    OTHER,NON-FORMULARY, Take 1 Cap by mouth daily. Ritual Essential for Women vitamin     naproxen (NAPROSYN) 500 mg tablet Take 500 mg by mouth two (2) times daily (with meals).  multivitamin (ONE A DAY) tablet Take 1 Tab by mouth daily.  hydroxychloroquine (PLAQUENIL) 200 mg tablet Take 200 mg by mouth daily.  acetaminophen (TYLENOL) 325 mg tablet Take  by mouth every four (4) hours as needed. No current facility-administered medications for this visit.       No Known Allergies  Past Surgical History:   Procedure Laterality Date    HX APPENDECTOMY      HX BREAST AUGMENTATION  2052 (replaced silicone with saline)    x2    HX TUBAL LIGATION       Social History     Occupational History    Occupation:    Tobacco Use    Smoking status: Never Smoker    Smokeless tobacco: Never Used   Substance and Sexual Activity    Alcohol use: Yes     Comment: 1-2 (rare)    Drug use: No    Sexual activity: Yes     Partners: Male     Family History   Problem Relation Age of Onset   [email or web address].Deem Migraines Mother     Stroke Mother     Cancer Sister         melanoma, skin    Hypertension Maternal Grandmother     Diabetes Maternal Grandmother     Colon Cancer Maternal Grandfather     Hypertension Maternal Grandfather     Diabetes Maternal Grandfather     Other Son         ADD        REVIEW OF SYSTEMS : 12/17/2018  ALL BELOW ARE Negative except : SEE HPI     Constitutional: Negative for fever, chills and weight loss. Neg Weight Loss  Cardiovascular: Negative for chest pain, claudication and leg swelling. SOB, WHEAT   Gastrointestinal/Urological: Negative for  pain, N/V/D/C, Blood in stool or urine,dysuria                         Hematuria, Incontinence, pelvic pain  Musculoskeletal: see HPI. Neurological: Negative for dizziness and weakness, headaches,Visual Changes             Confusion,  Or Seizures,   Psychiatric/Behavioral: Negative for depression, memory loss and substance abuse. Extremities:  Negative for hair changes, rash or skin lesion changes. Hematologic: Negative for Bleeding problems, bruising, pallor or swollen lymph nodes. Peripheral Vascular: No calf pain, vascular vein tenderness to calf pain              No calf throbbing, posterior knee throbbing pain     DIAGNOSTIC IMAGING     No notes on file    Please see above section of this report. I have personally reviewed the results of the above study. The interpretation of this study is my professional opinion. Written by Sarahi Fleming, as dictated by Dr. Agustin Patel. I, Dr. Agustin Patel, confirm that all documentation is accurate.

## 2018-12-31 ENCOUNTER — HOSPITAL ENCOUNTER (OUTPATIENT)
Age: 48
Discharge: HOME | End: 2018-12-31
Attending: ORTHOPAEDIC SURGERY
Payer: COMMERCIAL

## 2018-12-31 DIAGNOSIS — M79.672 LEFT FOOT PAIN: ICD-10-CM

## 2018-12-31 PROCEDURE — 73718 MRI LOWER EXTREMITY W/O DYE: CPT

## 2019-03-18 RX ORDER — LEVOTHYROXINE SODIUM 75 UG/1
TABLET ORAL
Qty: 90 TAB | Refills: 0 | Status: SHIPPED | OUTPATIENT
Start: 2019-03-18 | End: 2019-08-18 | Stop reason: SDUPTHER

## 2019-04-04 ENCOUNTER — OFFICE VISIT (OUTPATIENT)
Dept: FAMILY MEDICINE CLINIC | Age: 49
End: 2019-04-04

## 2019-04-04 VITALS
HEART RATE: 72 BPM | SYSTOLIC BLOOD PRESSURE: 128 MMHG | OXYGEN SATURATION: 98 % | WEIGHT: 140 LBS | HEIGHT: 65 IN | BODY MASS INDEX: 23.32 KG/M2 | RESPIRATION RATE: 16 BRPM | TEMPERATURE: 98.5 F | DIASTOLIC BLOOD PRESSURE: 80 MMHG

## 2019-04-04 DIAGNOSIS — M19.011 ARTHRITIS OF RIGHT ACROMIOCLAVICULAR JOINT: ICD-10-CM

## 2019-04-04 DIAGNOSIS — M75.51 SUBACROMIAL BURSITIS OF RIGHT SHOULDER JOINT: Primary | ICD-10-CM

## 2019-04-04 DIAGNOSIS — M75.81 RIGHT ROTATOR CUFF TENDINITIS: ICD-10-CM

## 2019-04-04 RX ORDER — TRIAMCINOLONE ACETONIDE 40 MG/ML
40 INJECTION, SUSPENSION INTRA-ARTICULAR; INTRAMUSCULAR ONCE
Qty: 1 ML | Refills: 0
Start: 2019-04-04 | End: 2019-04-04

## 2019-04-04 RX ORDER — LIDOCAINE HYDROCHLORIDE 10 MG/ML
3 INJECTION INFILTRATION; PERINEURAL ONCE
Qty: 3 ML | Refills: 0
Start: 2019-04-04 | End: 2019-04-04

## 2019-04-04 NOTE — PROGRESS NOTES
[facility] PROCEDURE PROGRESS NOTE Chart reviewed for the following: 
 Lisa Laguerre MD, have reviewed the History, Physical and updated the Allergic reactions for [place] TIME OUT performed immediately prior to start of procedure: 
 I, Skylar Mota MD, have performed the following reviews on [place] prior to the start of the procedure: 
         
* Patient was identified by name and date of birth * Agreement on procedure being performed was verified * Risks and Benefits explained to the patient * Procedure site verified and marked as necessary * Patient was positioned for comfort * Consent was signed and verified Time: 10:55am 
 
 
Date of procedure: 4/4/2019 Procedure performed by: Skylar Mota MD 
 
Provider assisted by: self Patient assisted by: self How tolerated by patient: tolerated the procedure well with no complications Right subacromial shoulder injection Patient was advised on risks associated with corticosteroid injection which include but are not limited to bleeding, infection, synovitis (flare reaction), tendon rupture, skin color changes, and fat atrophy. After the patient consented, the injection site was cleaned in sterile fashion. The right shoulder was injected using kenalog 40mg/mL 1cc + 1% lidocaine 3cc ([facility]#[id number], 5/1/2021) using the posterior approach. The injection was tolerated well with no complications. The patient was advised on the signs of infection and instructed to go to the ER or call the office if they are concerned about an infection. The patient was counseled on the signs of synovitis and instructed to ice as needed for 15 minutes every 2-3 hours and call the office.

## 2019-04-04 NOTE — PATIENT INSTRUCTIONS
Shoulder Pain: Care Instructions Your Care Instructions You can hurt your shoulder by using it too much during an activity, such as fishing or baseball. It can also happen as part of the everyday wear and tear of getting older. Shoulder injuries can be slow to heal, but your shoulder should get better with time. Your doctor may recommend a sling to rest your shoulder. If you have injured your shoulder, you may need testing and treatment. Follow-up care is a key part of your treatment and safety. Be sure to make and go to all appointments, and call your doctor if you are having problems. It's also a good idea to know your test results and keep a list of the medicines you take. How can you care for yourself at home? · Take pain medicines exactly as directed. ? If the doctor gave you a prescription medicine for pain, take it as prescribed. ? If you are not taking a prescription pain medicine, ask your doctor if you can take an over-the-counter medicine. ? Do not take two or more pain medicines at the same time unless the doctor told you to. Many pain medicines contain acetaminophen, which is Tylenol. Too much acetaminophen (Tylenol) can be harmful. · If your doctor recommends that you wear a sling, use it as directed. Do not take it off before your doctor tells you to. · Put ice or a cold pack on the sore area for 10 to 20 minutes at a time. Put a thin cloth between the ice and your skin. · If there is no swelling, you can put moist heat, a heating pad, or a warm cloth on your shoulder. Some doctors suggest alternating between hot and cold. · Rest your shoulder for a few days. If your doctor recommends it, you can then begin gentle exercise of the shoulder, but do not lift anything heavy. When should you call for help? Call 911 anytime you think you may need emergency care. For example, call if: 
  · You have chest pain or pressure. This may occur with: ? Sweating. ? Shortness of breath. ? Nausea or vomiting. ? Pain that spreads from the chest to the neck, jaw, or one or both shoulders or arms. ? Dizziness or lightheadedness. ? A fast or uneven pulse. After calling 911, chew 1 adult-strength aspirin. Wait for an ambulance. Do not try to drive yourself.  
  · Your arm or hand is cool or pale or changes color.  
 Call your doctor now or seek immediate medical care if: 
  · You have signs of infection, such as: 
? Increased pain, swelling, warmth, or redness in your shoulder. ? Red streaks leading from a place on your shoulder. ? Pus draining from an area of your shoulder. ? Swollen lymph nodes in your neck, armpits, or groin. ? A fever.  
 Watch closely for changes in your health, and be sure to contact your doctor if: 
  · You cannot use your shoulder.  
  · Your shoulder does not get better as expected. Where can you learn more? Go to http://andrew-arcelia.info/. Enter H562 in the search box to learn more about \"Shoulder Pain: Care Instructions. \" Current as of: September 20, 2018 Content Version: 11.9 © 7510-5349 [employer]. Care instructions adapted under license by [employer] (which disclaims liability or warranty for this information). If you have questions about a medical condition or this instruction, always ask your healthcare professional. [address] any warranty or liability for your use of this information. You can expect to have some minor tenderness and bruising at the injection site, however, if you experience any bleeding,drainage,or fever of 100.4 or greater, please call the office at [phone]. Please keep scheduled appointment on 06- for routine care

## 2019-04-04 NOTE — PROGRESS NOTES
SUBJECTIVE Chief Complaint Patient presents with  Shoulder Pain  
  c/o right shoulder pain (would like a cortisone injection)  Headache Patient presents for recurrent acute on chronic right shoulder pain. The patient denies any numbness, tingling, or weakness in the hand. Location: deep, anterior Injury: says she felt a pop when trying to plug something in and that seemed to flare it up Quality: sore, ache, throbbing at times Radiation: to biceps Aggravating factors: overhead activity, reaching, sleeping on right side Relieving factors: some with Aleve Has tried: NSAIDs. Formal PT in the past flared it up worse Injection in the past: in 2015 helped a lot until now X-rays: Has had MRI with various findings - see below. OBJECTIVE Blood pressure 128/80, pulse 72, temperature 98.5 °F (36.9 °C), temperature source Oral, resp. rate 16, height 5' 5\" (1.651 m), weight 140 lb (63.5 kg), last menstrual period 03/21/2019, SpO2 98 %. General:  alert, cooperative, well appearing, in no apparent distress. Right Shoulder: There is no obvious deformity. There is full range of motion of the shoulder with crepitations. Pain with active internal ROM. Strength is 5/5 with forward flexion, abduction, internal and external rotation, biceps, and triceps testing. There is weakness and pain with resisted supraspinatus testing. No pain or weakness with resisted external or internal rotation. Impingement testing is minimally positive. There is no AC tenderness and there is a negative scarf sign. There is no evidence of instability with anterior apprehension or sulcus testing. Speeds test is negative. Neurological:  There is no sensory or motor deficits. Skin:  The skin is without jaundice. It is intact without pathologic lesions, erythema, vesicles, discharge, or rash. Palpation of the skin is normal without induration, subcutaneous nodules, or tightening. Result Information Status: Final result (Exam End: 8/14/2015 11:27) Provider Status: Reviewed Study Result Procedure: MRI of the right shoulder without contrast 
  
CPT code: 60438 
  
Indications: Pain and decreased range of motion 
  
Comparisons: None 
  
Procedure: 
  
MRI of the shoulder was performed without contrast. Sequences included: Localizer, axial GRE, axial T2 with fat saturation, coronal T1, coronal T2 with 
fat saturation, sagittal T1, and sagittal T2 with fat saturation. 
  
Findings: 
  
    
1. No os acromiale. Mild acromioclavicular joint osteoarthrosis. Degenerative 
subchondral marrow edema along the anterolateral humeral head. No fracture or 
dislocation. 2.  Mild cartilage irregularity throughout the glenohumeral joint. 3.  Small amount of fluid in the glenohumeral joint. 4.  Moderate amount of fluid in the subacromial, subdeltoid bursa. 5.  The teres minor muscle is normal. There is mild tendinosis of the distal 
subscapularis tendon. There is moderate tendinosis of the supraspinatus and 
infraspinatus tendons. There is undersurface fraying of the mid fibers of the 
supraspinatus tendon. There is linear bursal surface tearing along the conjoined 
portion of the supraspinatus and infraspinatus tendons extending posteriorly 
into the infraspinatus tendon. No full-thickness tendon tear or retraction. No 
muscle atrophy or edema. 6.  Linear signal abnormality throughout the posterior superior labrum 
consistent with developing tear. 7. Biceps tendon is intact. IMPRESSION: 
  
  
Mild  tendinosis of the distal subscapularis tendon. Moderate tendinosis of the 
supraspinatus and infraspinatus tendons; undersurface fraying of the mid fibers 
of the supraspinatus tendon, with linear bursal surface tearing along the 
conjoined portion of the supraspinatus and infraspinatus tendons extending 
posteriorly into the infraspinatus tendon. No full-thickness tendon tear or retraction. No muscle atrophy. 
  
 Linear signal abnormality throughout the posterior superior labrum consistent 
with developing tear. Mild acromioclavicular joint osteoarthrosis. Degenerative subchondral marrow 
edema along the anterolateral humeral head. Mild cartilage irregularity 
throughout the glenohumeral joint. 
  
Small amount of fluid in the glenohumeral joint. Moderate amount of fluid in 
the subacromial, subdeltoid bursa.  
   
 
 
 
ASSESSMENT / PLAN 
  ICD-10-CM ICD-9-CM 1. Subacromial bursitis of right shoulder joint M75.51 726.19 TRIAMCINOLONE ACETONIDE INJ  
   triamcinolone acetonide (KENALOG) 40 mg/mL injection  
   lidocaine (XYLOCAINE) 10 mg/mL (1 %) injection NE DRAIN/INJECT LARGE JOINT/BURSA 2. Right rotator cuff tendinitis M75.81 726.10 3. Arthritis of right acromioclavicular joint M19.011 716.91 I reviewed the MRI once again. We did proceed with a subacromial bursal injection. 15 minutes of face to face time spent with the patient with at least 50% on counseling on above medical issues. All chart history elements were reviewed by me at the time of the visit even though marked at time of note closure. Patient understands our medical plan. Patient has provided input and agrees with goals. Alternatives have been explained and offered. All questions answered. The patient is to call if condition worsens or fails to improve. Follow-up and Dispositions · Return in about 2 months (around 6/10/2019) for routine care .

## 2019-04-04 NOTE — PROGRESS NOTES
1. Have you been to the ER, urgent care clinic since your last visit? Hospitalized since your last visit? No 
 
2. Have you seen or consulted any other health care providers outside of the [address] [place] [address] since your last visit? Include any pap smears or colon screening.  No

## 2019-05-18 LAB — CREATININE, EXTERNAL: 0.85

## 2019-06-10 ENCOUNTER — OFFICE VISIT (OUTPATIENT)
Dept: FAMILY MEDICINE CLINIC | Age: 49
End: 2019-06-10

## 2019-06-10 VITALS
DIASTOLIC BLOOD PRESSURE: 66 MMHG | WEIGHT: 141 LBS | HEIGHT: 65 IN | SYSTOLIC BLOOD PRESSURE: 122 MMHG | RESPIRATION RATE: 16 BRPM | OXYGEN SATURATION: 98 % | HEART RATE: 70 BPM | BODY MASS INDEX: 23.49 KG/M2 | TEMPERATURE: 98.7 F

## 2019-06-10 DIAGNOSIS — M75.51 SUBACROMIAL BURSITIS OF RIGHT SHOULDER JOINT: ICD-10-CM

## 2019-06-10 DIAGNOSIS — G57.62 MORTON'S NEUROMA OF LEFT FOOT: ICD-10-CM

## 2019-06-10 DIAGNOSIS — C44.91 SKIN CANCER, BASAL CELL: ICD-10-CM

## 2019-06-10 DIAGNOSIS — R21 RASH: ICD-10-CM

## 2019-06-10 DIAGNOSIS — M06.9 RHEUMATOID ARTHRITIS, INVOLVING UNSPECIFIED SITE, UNSPECIFIED RHEUMATOID FACTOR PRESENCE: ICD-10-CM

## 2019-06-10 DIAGNOSIS — F33.0 DEPRESSION, MAJOR, RECURRENT, MILD (HCC): ICD-10-CM

## 2019-06-10 DIAGNOSIS — E03.9 ACQUIRED HYPOTHYROIDISM: Primary | ICD-10-CM

## 2019-06-10 RX ORDER — CLOTRIMAZOLE AND BETAMETHASONE DIPROPIONATE 10; .64 MG/G; MG/G
CREAM TOPICAL
Qty: 15 G | Refills: 0 | Status: SHIPPED | OUTPATIENT
Start: 2019-06-10 | End: 2020-01-06 | Stop reason: ALTCHOICE

## 2019-06-10 NOTE — PROGRESS NOTES
1. Have you been to the ER, urgent care clinic since your last visit? Hospitalized since your last visit? No    2. Have you seen or consulted any other health care providers outside of the [address] [place] [address] since your last visit? Include any pap smears or colon screening.  No

## 2019-06-10 NOTE — PATIENT INSTRUCTIONS
A Healthy Lifestyle: Care Instructions  Your Care Instructions    A healthy lifestyle can help you feel good, stay at a healthy weight, and have plenty of energy for both work and play. A healthy lifestyle is something you can share with your whole family. A healthy lifestyle also can lower your risk for serious health problems, such as high blood pressure, heart disease, and diabetes. You can follow a few steps listed below to improve your health and the health of your family. Follow-up care is a key part of your treatment and safety. Be sure to make and go to all appointments, and call your doctor if you are having problems. It's also a good idea to know your test results and keep a list of the medicines you take. How can you care for yourself at home? · Do not eat too much sugar, fat, or fast foods. You can still have dessert and treats now and then. The goal is moderation. · Start small to improve your eating habits. Pay attention to portion sizes, drink less juice and soda pop, and eat more fruits and vegetables. ? Eat a healthy amount of food. A 3-ounce serving of meat, for example, is about the size of a deck of cards. Fill the rest of your plate with vegetables and whole grains. ? Limit the amount of soda and sports drinks you have every day. Drink more water when you are thirsty. ? Eat at least 5 servings of fruits and vegetables every day. It may seem like a lot, but it is not hard to reach this goal. A serving or helping is 1 piece of fruit, 1 cup of vegetables, or 2 cups of leafy, raw vegetables. Have an apple or some carrot sticks as an afternoon snack instead of a candy bar. Try to have fruits and/or vegetables at every meal.  · Make exercise part of your daily routine. You may want to start with simple activities, such as walking, bicycling, or slow swimming. Try to be active 30 to 60 minutes every day. You do not need to do all 30 to 60 minutes all at once.  For example, you can exercise 3 times a day for 10 or 20 minutes. Moderate exercise is safe for most people, but it is always a good idea to talk to your doctor before starting an exercise program.  · Keep moving. Kevin Bernalter the lawn, work in the garden, or [employer]. Take the stairs instead of the elevator at work. · If you smoke, quit. People who smoke have an increased risk for heart attack, stroke, cancer, and other lung illnesses. Quitting is hard, but there are ways to boost your chance of quitting tobacco for good. ? Use nicotine gum, patches, or lozenges. ? Ask your doctor about stop-smoking programs and medicines. ? Keep trying. In addition to reducing your risk of diseases in the future, you will notice some benefits soon after you stop using tobacco. If you have shortness of breath or asthma symptoms, they will likely get better within a few weeks after you quit. · Limit how much alcohol you drink. Moderate amounts of alcohol (up to 2 drinks a day for men, 1 drink a day for women) are okay. But drinking too much can lead to liver problems, high blood pressure, and other health problems. Family health  If you have a family, there are many things you can do together to improve your health. · Eat meals together as a family as often as possible. · Eat healthy foods. This includes fruits, vegetables, lean meats and dairy, and whole grains. · Include your family in your fitness plan. Most people think of activities such as jogging or tennis as the way to fitness, but there are many ways you and your family can be more active. Anything that makes you breathe hard and gets your heart pumping is exercise. Here are some tips:  ? Walk to do errands or to take your child to school or the bus.  ? Go for a family bike ride after dinner instead of watching TV. Where can you learn more? Go to http://andrew-arcelia.info/. Enter U352 in the search box to learn more about \"A Healthy Lifestyle: Care Instructions. \"  Current as of: September 11, 2018  Content Version: 11.9  © 0829-5910 [employer]. Care instructions adapted under license by [employer] (which disclaims liability or warranty for this information). If you have questions about a medical condition or this instruction, always ask your healthcare professional. [address] any warranty or liability for your use of this information.     Follow up in 6 months for routine care and TSH levels 3-7 days prior

## 2019-06-10 NOTE — PROGRESS NOTES
Chief Complaint   Patient presents with    Arthritis    Depression    Thyroid Problem      Subjective:   46 y.o. female for follow-up. New complaint - rash on left forearm for a few weeks. Maybe from interacting with a kitten she thinks. Not itchy. Occasionally using OTC topical antifungal.     TSH is currently normal on synthroid 75mcg daily (slightly below 4). Overall feeling well. She is on Wellbutrin for depression which she reports as well-controlled. Has RA and sees rheumatology regularly and has no complaints. Sees eye doctor every 6 months due to being on plaquenil. She has labs with them regularly. She says she is soon thinking of weaning down on Plaquenil. She sees Cornelio Pipes due to her family history of melanoma in her sister. She herself has had basal cell cancer. Has had a left forefoot pain diagnosed as a Sidhu's neuroma. Has some numbness and pain in the interspace. She has had an injection with podiatry. Overall does well if wearing right footwear. Shoulder doing well after cortisone injection. Objective: The patient appears well, alert, oriented x 3, in no distress. Visit Vitals  /66 (BP 1 Location: Right arm, BP Patient Position: Sitting)   Pulse 70   Temp 98.7 °F (37.1 °C) (Oral)   Resp 16   Ht 5' 5\" (1.651 m)   Wt 141 lb (64 kg)   LMP 05/13/2019   SpO2 98%   BMI 23.46 kg/m²     ENT normal.  No adenopathy or thyromegaly. Chest: Clear, no w/r/. Heart:  Normal S1S2, RRR, no murmurs. Psych: normal affect. Mood good. Oriented x 3. Judgement and insight intact. Labs from Rheum reviewed and scanned. Assessment/Plan:       ICD-10-CM ICD-9-CM    1. Acquired hypothyroidism E03.9 244.9 TSH 3RD GENERATION   2. Depression, major, recurrent, mild (HCC) F33.0 296.31    3. Rheumatoid arthritis, involving unspecified site, unspecified rheumatoid factor presence (HCC) M06.9 714.0    4. Skin cancer, basal cell C44.91 173.91    5.  Sidhu's neuroma of left foot G57.62 355.6    6. Subacromial bursitis of right shoulder joint M75.51 726.19    7. Rash R21 782. 1      Reviewed labs. Hypothyroidism - autoimmune likely. TSH is normal.  Cont synthroid 75mcg qam.  Check TSH in 6 months. Depression - continue Wellbutrin XL 150mg daily as this is working well. RA - Continue rheumatology follow-up. Also to keep regular follow-up with eye doctor since on Plaquenil. Labs per rheum. Reviewed notes. Basal cell cancer with a family history of melanoma - cont regular derm exams. Left burrows's neuroma - refer to Dr. John Stovall. Subacromial bursitis - resolved s/p injection. Rash - trial of lotrisone. If too expensive, just use OTC antifungal more consistently for 2 weeks. If not resolved, then call for cortisone Rx cream.    All chart history elements were reviewed by me at the time of the visit even though marked at time of note closure. Patient understands our medical plan. Patient has provided input and agrees with goals. Alternatives have been explained and offered. All questions answered. The patient is to call if condition worsens or fails to improve. Follow-up and Dispositions    · Return in about 6 months (around 12/10/2019) for  routine care and TSH levels 3-7 days prior.

## 2019-06-28 DIAGNOSIS — F32.A DEPRESSION, UNSPECIFIED DEPRESSION TYPE: ICD-10-CM

## 2019-06-28 RX ORDER — BUPROPION HYDROCHLORIDE 150 MG/1
TABLET ORAL
Qty: 90 TAB | Refills: 1 | Status: SHIPPED | OUTPATIENT
Start: 2019-06-28 | End: 2019-12-31

## 2019-08-19 RX ORDER — LEVOTHYROXINE SODIUM 75 UG/1
TABLET ORAL
Qty: 90 TAB | Refills: 1 | Status: SHIPPED | OUTPATIENT
Start: 2019-08-19 | End: 2020-02-14

## 2019-11-09 LAB — CREATININE, EXTERNAL: 0.89

## 2019-12-31 DIAGNOSIS — F32.A DEPRESSION, UNSPECIFIED DEPRESSION TYPE: ICD-10-CM

## 2019-12-31 RX ORDER — BUPROPION HYDROCHLORIDE 150 MG/1
TABLET ORAL
Qty: 90 TAB | Refills: 1 | Status: SHIPPED | OUTPATIENT
Start: 2019-12-31 | End: 2020-06-27

## 2020-01-06 ENCOUNTER — OFFICE VISIT (OUTPATIENT)
Dept: FAMILY MEDICINE CLINIC | Age: 50
End: 2020-01-06

## 2020-01-06 VITALS
WEIGHT: 144 LBS | TEMPERATURE: 98.5 F | BODY MASS INDEX: 23.99 KG/M2 | DIASTOLIC BLOOD PRESSURE: 86 MMHG | RESPIRATION RATE: 16 BRPM | HEIGHT: 65 IN | HEART RATE: 75 BPM | OXYGEN SATURATION: 98 % | SYSTOLIC BLOOD PRESSURE: 122 MMHG

## 2020-01-06 DIAGNOSIS — C44.91 SKIN CANCER, BASAL CELL: ICD-10-CM

## 2020-01-06 DIAGNOSIS — F32.A DEPRESSION, UNSPECIFIED DEPRESSION TYPE: ICD-10-CM

## 2020-01-06 DIAGNOSIS — E03.9 ACQUIRED HYPOTHYROIDISM: Primary | ICD-10-CM

## 2020-01-06 DIAGNOSIS — M06.9 RHEUMATOID ARTHRITIS, INVOLVING UNSPECIFIED SITE, UNSPECIFIED RHEUMATOID FACTOR PRESENCE: ICD-10-CM

## 2020-01-06 DIAGNOSIS — G44.211 INTRACTABLE EPISODIC TENSION-TYPE HEADACHE: ICD-10-CM

## 2020-01-06 NOTE — PATIENT INSTRUCTIONS
A Healthy Lifestyle: Care Instructions  Your Care Instructions    A healthy lifestyle can help you feel good, stay at a healthy weight, and have plenty of energy for both work and play. A healthy lifestyle is something you can share with your whole family. A healthy lifestyle also can lower your risk for serious health problems, such as high blood pressure, heart disease, and diabetes. You can follow a few steps listed below to improve your health and the health of your family. Follow-up care is a key part of your treatment and safety. Be sure to make and go to all appointments, and call your doctor if you are having problems. It's also a good idea to know your test results and keep a list of the medicines you take. How can you care for yourself at home? · Do not eat too much sugar, fat, or fast foods. You can still have dessert and treats now and then. The goal is moderation. · Start small to improve your eating habits. Pay attention to portion sizes, drink less juice and soda pop, and eat more fruits and vegetables. ? Eat a healthy amount of food. A 3-ounce serving of meat, for example, is about the size of a deck of cards. Fill the rest of your plate with vegetables and whole grains. ? Limit the amount of soda and sports drinks you have every day. Drink more water when you are thirsty. ? Eat at least 5 servings of fruits and vegetables every day. It may seem like a lot, but it is not hard to reach this goal. A serving or helping is 1 piece of fruit, 1 cup of vegetables, or 2 cups of leafy, raw vegetables. Have an apple or some carrot sticks as an afternoon snack instead of a candy bar. Try to have fruits and/or vegetables at every meal.  · Make exercise part of your daily routine. You may want to start with simple activities, such as walking, bicycling, or slow swimming. Try to be active 30 to 60 minutes every day. You do not need to do all 30 to 60 minutes all at once.  For example, you can exercise 3 times a day for 10 or 20 minutes. Moderate exercise is safe for most people, but it is always a good idea to talk to your doctor before starting an exercise program.  · Keep moving. [place] the lawn, work in the garden, or [place]. Take the stairs instead of the elevator at work. · If you smoke, quit. People who smoke have an increased risk for heart attack, stroke, cancer, and other lung illnesses. Quitting is hard, but there are ways to boost your chance of quitting tobacco for good. ? Use nicotine gum, patches, or lozenges. ? Ask your doctor about stop-smoking programs and medicines. ? Keep trying. In addition to reducing your risk of diseases in the future, you will notice some benefits soon after you stop using tobacco. If you have shortness of breath or asthma symptoms, they will likely get better within a few weeks after you quit. · Limit how much alcohol you drink. Moderate amounts of alcohol (up to 2 drinks a day for men, 1 drink a day for women) are okay. But drinking too much can lead to liver problems, high blood pressure, and other health problems. Family health  If you have a family, there are many things you can do together to improve your health. · Eat meals together as a family as often as possible. · Eat healthy foods. This includes fruits, vegetables, lean meats and dairy, and whole grains. · Include your family in your fitness plan. Most people think of activities such as jogging or tennis as the way to fitness, but there are many ways you and your family can be more active. Anything that makes you breathe hard and gets your heart pumping is exercise. Here are some tips:  ? Walk to do errands or to take your child to school or the bus.  ? Go for a family bike ride after dinner instead of watching TV. Where can you learn more? Go to http://andrew-arcelia.info/. Enter Z009 in the search box to learn more about \"A Healthy Lifestyle: Care Instructions. \"  Current as of: May 28, 2019  Content Version: 12.2  © 3044-8404 [email or web address]. Care instructions adapted under license by [employer] (which disclaims liability or warranty for this information). If you have questions about a medical condition or this instruction, always ask your healthcare professional. [address] any warranty or liability for your use of this information.

## 2020-01-06 NOTE — PROGRESS NOTES
1. Have you been to the ER, urgent care clinic since your last visit? Hospitalized since your last visit? No    2. Have you seen or consulted any other health care providers outside of the [address] [place] [address] since your last visit? Include any pap smears or colon screening.   Yes, [facility]    Mammogram: not UTD  Pap smear: not needed per patient

## 2020-02-04 ENCOUNTER — HOSPITAL ENCOUNTER (OUTPATIENT)
Dept: LAB | Age: 50
Discharge: HOME | End: 2020-02-04
Payer: COMMERCIAL

## 2020-02-04 ENCOUNTER — OFFICE VISIT (OUTPATIENT)
Dept: FAMILY MEDICINE CLINIC | Age: 50
End: 2020-02-04

## 2020-02-04 VITALS
RESPIRATION RATE: 16 BRPM | HEART RATE: 75 BPM | OXYGEN SATURATION: 98 % | WEIGHT: 145 LBS | DIASTOLIC BLOOD PRESSURE: 74 MMHG | SYSTOLIC BLOOD PRESSURE: 120 MMHG | HEIGHT: 65 IN | BODY MASS INDEX: 24.16 KG/M2 | TEMPERATURE: 98.2 F

## 2020-02-04 DIAGNOSIS — Z01.419 WELL WOMAN EXAM WITH ROUTINE GYNECOLOGICAL EXAM: Primary | ICD-10-CM

## 2020-02-04 DIAGNOSIS — Z12.4 SCREENING FOR CERVICAL CANCER: ICD-10-CM

## 2020-02-04 DIAGNOSIS — Z12.39 SCREENING FOR BREAST CANCER: ICD-10-CM

## 2020-02-04 PROCEDURE — 88175 CYTOPATH C/V AUTO FLUID REDO: CPT

## 2020-02-04 PROCEDURE — 87624 HPV HI-RISK TYP POOLED RSLT: CPT

## 2020-02-04 NOTE — PROGRESS NOTES
1. Have you been to the ER, urgent care clinic since your last visit? Hospitalized since your last visit? No    2. Have you seen or consulted any other health care providers outside of the [address] [place] [address] since your last visit? Include any pap smears or colon screening.  No

## 2020-02-04 NOTE — PROGRESS NOTES
Patient: Tiffany Espinoza MRN: [id number]  SSN: [id number]    YOB: 1970  Age: 44 y.o. Sex: female        Date of Service: 2/4/2020   Provider: MANDA Noel   Office Location:   Office Location:   [facility], [address] [place], [address]  Office Phone: [phone]  Office Fax: [phone]      REASON FOR VISIT:   Chief Complaint   Patient presents with    Well Woman     pap smear       VITALS:   Visit Vitals  /74 (BP 1 Location: Left arm, BP Patient Position: Sitting)   Pulse 75   Temp 98.2 °F (36.8 °C) (Oral)   Resp 16   Ht 5' 5\" (1.651 m)   Wt 145 lb (65.8 kg)   LMP 01/06/2020   SpO2 98%   BMI 24.13 kg/m²       MEDICATIONS:   Current Outpatient Medications on File Prior to Visit   Medication Sig Dispense Refill    mv,zulay,iron,mn/folic acid/chol (HAIR-SKIN-NAILS, PABA, PO) Take 1 Tab by mouth daily.  buPROPion XL (WELLBUTRIN XL) 150 mg tablet take 1 tablet by mouth once daily 90 Tab 1    levothyroxine (SYNTHROID) 75 mcg tablet take 1 tablet by mouth every morning on empty stomach 90 Tab 1    multivitamin (ONE A DAY) tablet Take 1 Tab by mouth daily.  [DISCONTINUED] OTHER,NON-FORMULARY, Take 1 Cap by mouth daily. Women's Ultra Mj Vitamin      naproxen (NAPROSYN) 500 mg tablet Take 500 mg by mouth two (2) times daily as needed.  acetaminophen (TYLENOL) 325 mg tablet Take  by mouth every four (4) hours as needed. No current facility-administered medications on file prior to visit.          ALLERGIES:   No Known Allergies     PAST MEDICAL HISTORY:  Past Medical History:   Diagnosis Date    Arthritis     Basal cell carcinoma of skin 2017    sees derm q6 months    Hypothyroid     Migraine 1/4/2010    Positive MARLI (antinuclear antibody) 5/2013    RA (rheumatoid arthritis) ([facility] [address]     working diagnosis so far, sees Dr. Ann Marie Rodney S/P colonoscopy 01/24/2019    Dr. Leila Taylor; normal; f/u 10 years; random colon biopy-unremarkable colonic mucosa         SURGICAL HISTORY:  Past Surgical History:   Procedure Laterality Date    HX APPENDECTOMY      HX BREAST AUGMENTATION  5977 (replaced silicone with saline)    x2    HX TUBAL LIGATION          FAMILY HISTORY:  Family History   Problem Relation Age of Onset   [address] Migraines Mother     Stroke Mother     Cancer Sister         melanoma, skin    Hypertension Maternal Grandmother     Diabetes Maternal Grandmother     Colon Cancer Maternal Grandfather     Hypertension Maternal Grandfather     Diabetes Maternal Grandfather     Other Son         ADD        SOCIAL HISTORY:  Social History     Socioeconomic History    Marital status:      Spouse name: Not on file    Number of children: Not on file    Years of education: Not on file    Highest education level: Not on file   Occupational History    Occupation:    Tobacco Use    Smoking status: Never Smoker    Smokeless tobacco: Never Used   Substance and Sexual Activity    Alcohol use: Yes     Comment: 1-2 (rare)    Drug use: No    Sexual activity: Yes     Partners: Male        OBSTETRIC HISTORY:  OB History        3    Para   3    Term   2       1    AB        Living   2       SAB        TAB        Ectopic        Molar        Multiple        Live Births                        MENSTRUAL HISTORY:  LMP: around 18  Length of Menses usually 3-4 days, sometimes as long as 7 days  Length of Cycle: 28 days  Menstrual Complaints: starting to become a bit irregular.      SEXUAL HISTORY:  Sexual activity: Patient is sexually active with 1 male partner, not concerned for STIs   Contraceptive method: none  History of STIs: none      HEALTH SCREENING HISTORY:  Last pap: 14  Results: normal  History of abnormal pap?: NO     Last mammogram: 18, negative. Last DEXA scan: N/A  Last colorectal screening: colonoscopy 17, repeat in 10 years       HPI:  Art Baron is a 44 y.o. female who presents to the office for her annual well woman exam. PCP: Dr. Kenzie Nobles:   Review of Systems   Constitutional: Negative for chills, fever and malaise/fatigue. Respiratory: Negative for cough, shortness of breath and wheezing. Cardiovascular: Negative for chest pain, palpitations and leg swelling. Gastrointestinal: Negative for abdominal pain, constipation, diarrhea, nausea and vomiting. Breasts: Denies masses, skin changes, nipple discharge  Genitourinary: Denies pelvic pain, dyspareunia, abnormal vaginal bleeding or discharge, urinary frequency, urgency, dysuria, hematuria. PHYSICAL EXAMINATION:   Physical Exam  Cardiovascular:      Rate and Rhythm: Normal rate and regular rhythm. Heart sounds: Normal heart sounds. No murmur. No friction rub. No gallop. Pulmonary:      Effort: Pulmonary effort is normal.      Breath sounds: Normal breath sounds. No wheezing or rales. Skin:     General: Skin is warm and dry. Findings: No rash. Neurological:      Mental Status: She is alert and oriented to person, place, and time. Gait: Gait is intact. Psychiatric:         Mood and Affect: Mood and affect normal.         Cognition and Memory: Memory normal.     Breasts:     Symmetric bilaterally without skin or nipple changes, tenderness, or masses. Implants in place   Pelvic:     External genitalia - no erythema, rashes, or lesions. Internal - vagina pink rugae without lesions or discharge. Cervix - pink, non-friable, os patent with no discharge. No cervical motion tenderness. Bimanual - Uterus and adnexae non-tender. No masses palpated. ASSESSMENT/PLAN:  Diagnoses and all orders for this visit:    1. Well woman exam with routine gynecological exam  - Normal physical exam findings as detailed above  - Health screenings reviewed & updated  - Pap obtained today  - Mammogram ordered   - Age and gender specific counseling provided      2.  Screening for cervical cancer  Orders:    -     PAP IG, APTIMA HPV AND RFX 16/18,45 ([id number]); Future    3. Screening for breast cancer  Orders:    -     [facility] MAMMO BI SCREENING INCL CAD; Future        f/u as scheduled with Dr. Barbara Cody      Follow-up and Dispositions    · Return in about 5 months (around 7/6/2020) for routine care .           MANDA Blake  2/4/2020   10:08 AM

## 2020-02-04 NOTE — PATIENT INSTRUCTIONS
Well Visit, Women 48 to 72: Care Instructions Your Care Instructions Physical exams can help you stay healthy. Your doctor has checked your overall health and may have suggested ways to take good care of yourself. He or she also may have recommended tests. At home, you can help prevent illness with healthy eating, regular exercise, and other steps. Follow-up care is a key part of your treatment and safety. Be sure to make and go to all appointments, and call your doctor if you are having problems. It's also a good idea to know your test results and keep a list of the medicines you take. How can you care for yourself at home? · Reach and stay at a healthy weight. This will lower your risk for many problems, such as obesity, diabetes, heart disease, and high blood pressure. · Get at least 30 minutes of exercise on most days of the week. Walking is a good choice. You also may want to do other activities, such as running, swimming, cycling, or playing tennis or team sports. · Do not smoke. Smoking can make health problems worse. If you need help quitting, talk to your doctor about stop-smoking programs and medicines. These can increase your chances of quitting for good. · Protect your skin from too much sun. When you're outdoors from 10 a.m. to 4 p.m., stay in the shade or cover up with clothing and a hat with a wide brim. Wear sunglasses that block UV rays. Even when it's cloudy, put broad-spectrum sunscreen (SPF 30 or higher) on any exposed skin. · See a dentist one or two times a year for checkups and to have your teeth cleaned. · Wear a seat belt in the car. Follow your doctor's advice about when to have certain tests. These tests can spot problems early. · Cholesterol. Your doctor will tell you how often to have this done based on your age, family history, or other things that can increase your risk for heart attack and stroke. · Blood pressure. Have your blood pressure checked during a routine doctor visit. Your doctor will tell you how often to check your blood pressure based on your age, your blood pressure results, and other factors. · Mammogram. Ask your doctor how often you should have a mammogram, which is an X-ray of your breasts. A mammogram can spot breast cancer before it can be felt and when it is easiest to treat. · Pap test and pelvic exam. Ask your doctor how often you should have a Pap test. You may not need to have a Pap test as often as you used to. · Vision. Have your eyes checked every year or two or as often as your doctor suggests. Some experts recommend that you have yearly exams for glaucoma and other age-related eye problems starting at age 48. · Hearing. Tell your doctor if you notice any change in your hearing. You can have tests to find out how well you hear. · Diabetes. Ask your doctor whether you should have tests for diabetes. · Colorectal cancer. Your risk for colorectal cancer gets higher as you get older. Some experts say that adults should start regular screening at age 48 and stop at age 76. Others say to start before age 48 or continue after age 76. Talk with your doctor about your risk and when to start and stop screening. · Thyroid disease. Talk to your doctor about whether to have your thyroid checked as part of a regular physical exam. Women have an increased chance of a thyroid problem. · Osteoporosis. You should begin tests for bone density at age 72. If you are younger than 72, ask your doctor whether you have factors that may increase your risk for this disease. You may want to have this test before age 72. · Heart attack and stroke risk. At least every 4 to 6 years, you should have your risk for heart attack and stroke assessed.  Your doctor uses factors such as your age, blood pressure, cholesterol, and whether you smoke or have diabetes to show what your risk for a heart attack or stroke is over the next 10 years. When should you call for help? Watch closely for changes in your health, and be sure to contact your doctor if you have any problems or symptoms that concern you. Where can you learn more? Go to http://andrew-arcelia.info/. Enter X481 in the search box to learn more about \"Well Visit, Women 50 to 72: Care Instructions. \" Current as of: December 13, 2018 Content Version: 12.2 © 0056-0421 [employer]wise, Incorporated. Care instructions adapted under license by [employer] (which disclaims liability or warranty for this information). If you have questions about a medical condition or this instruction, always ask your healthcare professional. [address] any warranty or liability for your use of this information.

## 2020-02-14 RX ORDER — LEVOTHYROXINE SODIUM 75 UG/1
TABLET ORAL
Qty: 90 TAB | Refills: 1 | Status: SHIPPED | OUTPATIENT
Start: 2020-02-14 | End: 2020-08-16

## 2020-06-26 ENCOUNTER — HOSPITAL ENCOUNTER (OUTPATIENT)
Dept: LAB | Age: 50
Discharge: HOME | End: 2020-06-26

## 2020-06-26 LAB — XX-LABCORP SPECIMEN COL,LCBCF: NORMAL

## 2020-06-26 PROCEDURE — 99001 SPECIMEN HANDLING PT-LAB: CPT

## 2020-06-27 DIAGNOSIS — F32.A DEPRESSION, UNSPECIFIED DEPRESSION TYPE: ICD-10-CM

## 2020-06-27 LAB
SPECIMEN STATUS REPORT, ROLRST: NORMAL
TSH SERPL DL<=0.005 MIU/L-ACNC: 1.52 UIU/ML (ref 0.45–4.5)

## 2020-06-27 RX ORDER — BUPROPION HYDROCHLORIDE 150 MG/1
TABLET ORAL
Qty: 90 TAB | Refills: 1 | Status: SHIPPED | OUTPATIENT
Start: 2020-06-27 | End: 2020-12-28

## 2020-07-06 ENCOUNTER — TELEPHONE (OUTPATIENT)
Dept: FAMILY MEDICINE CLINIC | Age: 50
End: 2020-07-06

## 2020-07-06 NOTE — TELEPHONE ENCOUNTER
Have you been diagnosed with, tested for, or told that you are suspected of having COVID-19 (coronovirus)? No  Have you had a fever or taken medication to treat a fever in the past 72 hours? No  Have you had a cough, SOB, or flu-like symptoms within the past 3 days? No   Have you had direct contact with someone who tested positive for COVID-19 within the past 14 days? No   Do you have a household member with flu-like symptoms, including fever, cough, or SOB? No   Do you currently have flu-like symptoms including fever, cough, or SOB?  No

## 2020-07-07 ENCOUNTER — OFFICE VISIT (OUTPATIENT)
Dept: FAMILY MEDICINE CLINIC | Age: 50
End: 2020-07-07

## 2020-07-07 VITALS
OXYGEN SATURATION: 99 % | HEIGHT: 65 IN | RESPIRATION RATE: 14 BRPM | BODY MASS INDEX: 21.16 KG/M2 | SYSTOLIC BLOOD PRESSURE: 108 MMHG | HEART RATE: 74 BPM | WEIGHT: 127 LBS | TEMPERATURE: 98.4 F | DIASTOLIC BLOOD PRESSURE: 76 MMHG

## 2020-07-07 DIAGNOSIS — F33.0 DEPRESSION, MAJOR, RECURRENT, MILD (HCC): ICD-10-CM

## 2020-07-07 DIAGNOSIS — M06.9 RHEUMATOID ARTHRITIS, INVOLVING UNSPECIFIED SITE, UNSPECIFIED RHEUMATOID FACTOR PRESENCE: ICD-10-CM

## 2020-07-07 DIAGNOSIS — C44.91 SKIN CANCER, BASAL CELL: ICD-10-CM

## 2020-07-07 DIAGNOSIS — E03.9 ACQUIRED HYPOTHYROIDISM: Primary | ICD-10-CM

## 2020-07-07 NOTE — PATIENT INSTRUCTIONS
A Healthy Lifestyle: Care Instructions Your Care Instructions A healthy lifestyle can help you feel good, stay at a healthy weight, and have plenty of energy for both work and play. A healthy lifestyle is something you can share with your whole family. A healthy lifestyle also can lower your risk for serious health problems, such as high blood pressure, heart disease, and diabetes. You can follow a few steps listed below to improve your health and the health of your family. Follow-up care is a key part of your treatment and safety. Be sure to make and go to all appointments, and call your doctor if you are having problems. It's also a good idea to know your test results and keep a list of the medicines you take. How can you care for yourself at home? · Do not eat too much sugar, fat, or fast foods. You can still have dessert and treats now and then. The goal is moderation. · Start small to improve your eating habits. Pay attention to portion sizes, drink less juice and soda pop, and eat more fruits and vegetables. ? Eat a healthy amount of food. A 3-ounce serving of meat, for example, is about the size of a deck of cards. Fill the rest of your plate with vegetables and whole grains. ? Limit the amount of soda and sports drinks you have every day. Drink more water when you are thirsty. ? Eat at least 5 servings of fruits and vegetables every day. It may seem like a lot, but it is not hard to reach this goal. A serving or helping is 1 piece of fruit, 1 cup of vegetables, or 2 cups of leafy, raw vegetables. Have an apple or some carrot sticks as an afternoon snack instead of a candy bar. Try to have fruits and/or vegetables at every meal. 
· Make exercise part of your daily routine. You may want to start with simple activities, such as walking, bicycling, or slow swimming. Try to be active 30 to 60 minutes every day.  You do not need to do all 30 to 60 minutes all at once. For example, you can exercise 3 times a day for 10 or 20 minutes. Moderate exercise is safe for most people, but it is always a good idea to talk to your doctor before starting an exercise program. 
· Keep moving. [place] Read the lawn, work in the garden, or [place]. Take the stairs instead of the elevator at work. · If you smoke, quit. People who smoke have an increased risk for heart attack, stroke, cancer, and other lung illnesses. Quitting is hard, but there are ways to boost your chance of quitting tobacco for good. ? Use nicotine gum, patches, or lozenges. ? Ask your doctor about stop-smoking programs and medicines. ? Keep trying. In addition to reducing your risk of diseases in the future, you will notice some benefits soon after you stop using tobacco. If you have shortness of breath or asthma symptoms, they will likely get better within a few weeks after you quit. · Limit how much alcohol you drink. Moderate amounts of alcohol (up to 2 drinks a day for men, 1 drink a day for women) are okay. But drinking too much can lead to liver problems, high blood pressure, and other health problems. Family health If you have a family, there are many things you can do together to improve your health. · Eat meals together as a family as often as possible. · Eat healthy foods. This includes fruits, vegetables, lean meats and dairy, and whole grains. · Include your family in your fitness plan. Most people think of activities such as jogging or tennis as the way to fitness, but there are many ways you and your family can be more active. Anything that makes you breathe hard and gets your heart pumping is exercise. Here are some tips: 
? Walk to do errands or to take your child to school or the bus. 
? Go for a family bike ride after dinner instead of watching TV. Where can you learn more? Go to http://andrew-arcelia.info/ Enter J749 in the search box to learn more about \"A Healthy Lifestyle: Care Instructions. \" Current as of: January 31, 2020               Content Version: 12.5 © 2006-2020 Healthwise, Incorporated. Care instructions adapted under license by [employer] (which disclaims liability or warranty for this information). If you have questions about a medical condition or this instruction, always ask your healthcare professional. [place] [address] any warranty or liability for your use of this information.

## 2020-07-07 NOTE — PROGRESS NOTES
Chief Complaint   Patient presents with    Results     TSH review    Thyroid Problem    Depression      Subjective:   44 y.o. female for follow-up. TSH is currently normal on synthroid 75mcg daily. Latest TSH was normal at rheum. Overall feeling well. She is on Wellbutrin for depression which she reports as well-controlled. Has RA and sees rheumatology regularly and has no complaints. She has successfully transitioned off plaquenil and has done so well. She sees Paty Patel due to her family history of melanoma in her sister. She herself has had basal cell cancer. ROS:  History obtained from the patient  · General: negative for - chills, fever. Intentional weight loss. · HEENT: no sore throat, nasal congestion. Mild allergy symptoms lately. · Respiratory: no cough, shortness of breath, or wheezing  · Cardiovascular: no chest pain, palpitations, or dyspnea on exertion  · Gastrointestinal: no abdominal pain, N/V, change in bowel habits, or black or bloody stools    Objective: The patient appears well, alert, oriented x 3, in no distress. Visit Vitals  /76 (BP 1 Location: Left arm, BP Patient Position: Sitting)   Pulse 74   Temp 98.4 °F (36.9 °C) (Oral)   Resp 14   Ht 5' 5\" (1.651 m)   Wt 127 lb (57.6 kg)   LMP 06/24/2020   SpO2 99%   BMI 21.13 kg/m²     ENT normal.  No adenopathy or thyromegaly. Chest: Clear, no w/r/. Heart:  Normal S1S2, RRR, no murmurs. Neuro:  CN2-12 intact. No deficits. Psych: normal affect. Mood good. Oriented x 3. Judgement and insight intact. Results for Radha Candelaria (MRN [id number]) as of 7/7/2020 08:12   Ref. Range 6/26/2020 11:24   TSH Latest Ref Range: 0.450 - 4.500 uIU/mL 1.520   [facility] SPECIMEN COL Unknown Rpt   [facility] SPECIMEN COLLN. Latest Units:   Specimens collected/sent to [facility]. Please direct inquiries to ([phone]). Assessment/Plan:       ICD-10-CM ICD-9-CM    1. Acquired hypothyroidism E03.9 244.9    2. Rheumatoid arthritis, involving unspecified site, unspecified rheumatoid factor presence ([facility].) M06.9 714.0    3. Skin cancer, basal cell C44.91 173.91    4. Depression, major, recurrent, mild ([facility] F33.0 296.31      Reviewed labs. Hypothyroidism - autoimmune likely. TSH is normal.  Cont synthroid 75mcg qam.  Check TSH in 6 months but I will wait to order this to see what labs Dr. Chuy Arboleda has run. RA - Continue rheumatology follow-up. Doing well off Plaquenil. Seeing rheum in Aug.  Likely to have labs. Basal cell cancer with a family history of melanoma - cont regular derm exams. Has not been able to keep up due to COVID-19 pandemic. Depression - continue Wellbutrin XL 150mg daily as this is working well. Can call for an increase if stress levels increase too much. Patient had to hold off on mammogram due to COVID-19 pandemic and is considering scheduling now. All chart history elements were reviewed by me at the time of the visit even though marked at time of note closure. Patient understands our medical plan. Patient has provided input and agrees with goals. Alternatives have been explained and offered. All questions answered. The patient is to call if condition worsens or fails to improve. RTC in 6 months. We will discuss what labs to get at her next OV since her lab / OV routine is different off plaquenil with rheum.

## 2020-07-07 NOTE — PROGRESS NOTES
1. Have you been to the ER, urgent care clinic since your last visit? Hospitalized since your last visit? No    2. Have you seen or consulted any other health care providers outside of the [address] [place] [address] since your last visit? Include any pap smears or colon screening.  No

## 2020-08-16 RX ORDER — LEVOTHYROXINE SODIUM 75 UG/1
TABLET ORAL
Qty: 90 TAB | Refills: 1 | Status: SHIPPED | OUTPATIENT
Start: 2020-08-16 | End: 2021-02-15

## 2020-11-06 LAB — CREATININE, EXTERNAL: 0.91

## 2020-12-18 DIAGNOSIS — Z12.39 SCREENING FOR BREAST CANCER: ICD-10-CM

## 2020-12-28 DIAGNOSIS — F32.A DEPRESSION, UNSPECIFIED DEPRESSION TYPE: ICD-10-CM

## 2020-12-28 RX ORDER — BUPROPION HYDROCHLORIDE 150 MG/1
TABLET ORAL
Qty: 90 TAB | Refills: 1 | Status: SHIPPED | OUTPATIENT
Start: 2020-12-28 | End: 2021-06-29 | Stop reason: SDUPTHER

## 2021-01-07 ENCOUNTER — VIRTUAL VISIT (OUTPATIENT)
Dept: FAMILY MEDICINE CLINIC | Age: 51
End: 2021-01-07
Payer: COMMERCIAL

## 2021-01-07 DIAGNOSIS — E03.9 ACQUIRED HYPOTHYROIDISM: Primary | ICD-10-CM

## 2021-01-07 DIAGNOSIS — F33.0 DEPRESSION, MAJOR, RECURRENT, MILD (HCC): ICD-10-CM

## 2021-01-07 DIAGNOSIS — M06.9 RHEUMATOID ARTHRITIS, INVOLVING UNSPECIFIED SITE, UNSPECIFIED WHETHER RHEUMATOID FACTOR PRESENT (HCC): ICD-10-CM

## 2021-01-07 DIAGNOSIS — C44.91 SKIN CANCER, BASAL CELL: ICD-10-CM

## 2021-01-07 PROCEDURE — 99214 OFFICE O/P EST MOD 30 MIN: CPT | Performed by: FAMILY MEDICINE

## 2021-01-07 NOTE — PROGRESS NOTES
1. Have you been to the ER, urgent care clinic since your last visit? Hospitalized since your last visit? No    2. Have you seen or consulted any other health care providers outside of the [address] [place] [address] since your last visit? Include any pap smears or colon screening.  Yes, Clayton Otoole

## 2021-01-07 NOTE — PATIENT INSTRUCTIONS
A Healthy Lifestyle: Care Instructions Your Care Instructions A healthy lifestyle can help you feel good, stay at a healthy weight, and have plenty of energy for both work and play. A healthy lifestyle is something you can share with your whole family. A healthy lifestyle also can lower your risk for serious health problems, such as high blood pressure, heart disease, and diabetes. You can follow a few steps listed below to improve your health and the health of your family. Follow-up care is a key part of your treatment and safety. Be sure to make and go to all appointments, and call your doctor if you are having problems. It's also a good idea to know your test results and keep a list of the medicines you take. How can you care for yourself at home? · Do not eat too much sugar, fat, or fast foods. You can still have dessert and treats now and then. The goal is moderation. · Start small to improve your eating habits. Pay attention to portion sizes, drink less juice and soda pop, and eat more fruits and vegetables. ? Eat a healthy amount of food. A 3-ounce serving of meat, for example, is about the size of a deck of cards. Fill the rest of your plate with vegetables and whole grains. ? Limit the amount of soda and sports drinks you have every day. Drink more water when you are thirsty. ? Eat at least 5 servings of fruits and vegetables every day. It may seem like a lot, but it is not hard to reach this goal. A serving or helping is 1 piece of fruit, 1 cup of vegetables, or 2 cups of leafy, raw vegetables. Have an apple or some carrot sticks as an afternoon snack instead of a candy bar.  Try to have fruits and/or vegetables at every meal. 
 · Make exercise part of your daily routine. You may want to start with simple activities, such as walking, bicycling, or slow swimming. Try to be active 30 to 60 minutes every day. You do not need to do all 30 to 60 minutes all at once. For example, you can exercise 3 times a day for 10 or 20 minutes. Moderate exercise is safe for most people, but it is always a good idea to talk to your doctor before starting an exercise program. 
· Keep moving. Sina Free the lawn, work in the garden, or [employer]. Take the stairs instead of the elevator at work. · If you smoke, quit. People who smoke have an increased risk for heart attack, stroke, cancer, and other lung illnesses. Quitting is hard, but there are ways to boost your chance of quitting tobacco for good. ? Use nicotine gum, patches, or lozenges. ? Ask your doctor about stop-smoking programs and medicines. ? Keep trying. In addition to reducing your risk of diseases in the future, you will notice some benefits soon after you stop using tobacco. If you have shortness of breath or asthma symptoms, they will likely get better within a few weeks after you quit. · Limit how much alcohol you drink. Moderate amounts of alcohol (up to 2 drinks a day for men, 1 drink a day for women) are okay. But drinking too much can lead to liver problems, high blood pressure, and other health problems. Family health If you have a family, there are many things you can do together to improve your health. · Eat meals together as a family as often as possible. · Eat healthy foods. This includes fruits, vegetables, lean meats and dairy, and whole grains. · Include your family in your fitness plan. Most people think of activities such as jogging or tennis as the way to fitness, but there are many ways you and your family can be more active. Anything that makes you breathe hard and gets your heart pumping is exercise. Here are some tips: ? Walk to do errands or to take your child to school or the bus. 
? Go for a family bike ride after dinner instead of watching TV. Where can you learn more? Go to http://www.gray.com/ Enter R658 in the search box to learn more about \"A Healthy Lifestyle: Care Instructions. \" Current as of: January 31, 2020               Content Version: 12.6 © 2006-2020 [employer]. Care instructions adapted under license by [employer] (which disclaims liability or warranty for this information). If you have questions about a medical condition or this instruction, always ask your healthcare professional. [address] any warranty or liability for your use of this information.

## 2021-01-07 NOTE — PROGRESS NOTES
Chief Complaint   Patient presents with    Thyroid Problem    Arthritis     RA    Depression      Cassandra Guzmanpool, who was evaluated through a synchronous (real-time) audio-video encounter, and/or her healthcare decision maker, is aware that it is a billable service, with coverage as determined by her insurance carrier. She provided verbal consent to proceed: Yes, and patient identification was verified. It was conducted pursuant to the emergency declaration under the [facility] [address] and the [employer] Act. A caregiver was present when appropriate. Ability to conduct physical exam was limited. I was in the office. The patient was at home. Subjective:   44 y.o. female for follow-up. TSH is currently normal on synthroid 75mcg daily. Latest TSH was normal at rheum in Dec. Overall feeling well. No symptoms of hypothyroid. She is on Wellbutrin for depression which she reports as well-controlled. Has RA and sees rheumatology regularly and has no complaints. She has successfully transitioned off plaquenil and has done so well. She has had markers of inflammation to be negative. She is only going to see Dr. Frankey Birkenhead as needed now. She sees Basilia Harrington due to her family history of melanoma in her sister. She herself has had basal cell cancer. ROS:  History obtained from the patient  · Respiratory: no cough, shortness of breath, or wheezing reported by patient  · Cardiovascular: no chest pain, palpitations, or dyspnea on exertion reported by patient  · Gastrointestinal: no abd symptoms reported. She is up to date on colonoscopy. Objective: The patient appears well, alert, oriented x 3, in no distress. Psych: normal affect. Mood good. Oriented x 3. Judgement and insight intact.        Results for orders placed or performed in visit on 12/10/20   AMB EXT CREATININE   Result Value Ref Range    Creatinine, External 0.91      TSH done in Dec is in normal range in 2s. Assessment/Plan:       ICD-10-CM ICD-9-CM    1. Acquired hypothyroidism  E03.9 244.9 TSH 3RD GENERATION   2. Depression, major, recurrent, mild (HCC)  F33.0 296.31    3. Rheumatoid arthritis, involving unspecified site, unspecified whether rheumatoid factor present (HCC)  M06.9 714.0 SED RATE (ESR)      C REACTIVE PROTEIN, QT   4. Skin cancer, basal cell  C44.91 173.91      Reviewed labs. Hypothyroidism - autoimmune likely. TSH is normal.  Cont synthroid 75mcg qam.  Check TSH in 6 months but she will call for results rather than us seeing her. Depression - continue Wellbutrin XL 150mg daily as this is working well. RA - Transitioning to as needed follow-up with rheum so I will check a sed rate and CRP in 6 months. Doing well off Plaquenil. Basal cell cancer with a family history of melanoma - cont regular derm exams. Has been seen recently. All chart history elements were reviewed by me at the time of the visit even though marked at time of note closure. Patient understands our medical plan. Patient has provided input and agrees with goals. Alternatives have been explained and offered. All questions answered. The patient is to call if condition worsens or fails to improve. Follow-up and Dispositions    · Return in about 1 year (around 1/7/2022). RTC in 12 months but labs in 6 months. When she gets those lab results, she can schedule her Jan 2021 routine follow-up.

## 2021-02-15 RX ORDER — LEVOTHYROXINE SODIUM 75 UG/1
TABLET ORAL
Qty: 90 TAB | Refills: 1 | Status: SHIPPED | OUTPATIENT
Start: 2021-02-15 | End: 2021-08-13

## 2021-06-29 DIAGNOSIS — F32.A DEPRESSION, UNSPECIFIED DEPRESSION TYPE: ICD-10-CM

## 2021-06-29 RX ORDER — BUPROPION HYDROCHLORIDE 300 MG/1
TABLET ORAL
Qty: 30 TABLET | Refills: 0 | Status: SHIPPED | OUTPATIENT
Start: 2021-06-29 | End: 2021-07-06

## 2021-06-29 NOTE — TELEPHONE ENCOUNTER
Breakthrough anxiety. Inc to Wellbutrin XL 300mg daily. She is aware and wanted a 30 day supply to try.

## 2021-07-05 ENCOUNTER — PATIENT MESSAGE (OUTPATIENT)
Dept: FAMILY MEDICINE CLINIC | Age: 51
End: 2021-07-05

## 2021-07-05 DIAGNOSIS — F32.A DEPRESSION, UNSPECIFIED DEPRESSION TYPE: ICD-10-CM

## 2021-07-06 RX ORDER — BUPROPION HYDROCHLORIDE 150 MG/1
TABLET ORAL
Qty: 30 TABLET | Refills: 11 | Status: SHIPPED | OUTPATIENT
Start: 2021-07-06 | End: 2021-07-06 | Stop reason: SDUPTHER

## 2021-07-06 RX ORDER — BUPROPION HYDROCHLORIDE 150 MG/1
TABLET ORAL
Qty: 90 TABLET | Refills: 1 | Status: SHIPPED | OUTPATIENT
Start: 2021-07-06 | End: 2021-12-14

## 2021-07-06 NOTE — TELEPHONE ENCOUNTER
From: Ian Canseco  To: Sandip Andrews MD  Sent: 7/5/2021 7:08 AM EDT  Subject: Prescription Question    I filled my new script and took 3 doses so far. It made me feel bad. Headache & irritable. I don't think it's the right dose for me. I hate to ask but can we just refill the 150? I know you said it takes about 3 weeks but I can feel like I did those 3 days. Today I feel much better. I did not take any on Sunday or this morning.

## 2021-08-13 RX ORDER — LEVOTHYROXINE SODIUM 75 UG/1
TABLET ORAL
Qty: 90 TABLET | Refills: 0 | Status: SHIPPED | OUTPATIENT
Start: 2021-08-13 | End: 2021-10-18

## 2021-08-13 NOTE — TELEPHONE ENCOUNTER
Due for follow-up after labs.   Virtual is okay if she does not need in-person based on any additional complaints that may need physical exam.

## 2021-08-14 NOTE — TELEPHONE ENCOUNTER
David Acosta [phone] advising patient that medication has been sent to pharmacy and that she is due for labs and f/up. Patient advised that she can do a virtual appointment if she prefers after labs. She was asked to contact [facility] during business hours to schedule an appointment.

## 2021-08-27 ENCOUNTER — HOSPITAL ENCOUNTER (OUTPATIENT)
Dept: LAB | Age: 51
Discharge: HOME | End: 2021-08-27

## 2021-08-27 LAB — XX-LABCORP SPECIMEN COL,LCBCF: NORMAL

## 2021-08-27 PROCEDURE — 99001 SPECIMEN HANDLING PT-LAB: CPT

## 2021-08-28 LAB
CRP SERPL-MCNC: <1 MG/L (ref 0–10)
ERYTHROCYTE [SEDIMENTATION RATE] IN BLOOD BY WESTERGREN METHOD: 15 MM/HR (ref 0–40)
TSH SERPL DL<=0.005 MIU/L-ACNC: 1.33 UIU/ML (ref 0.45–4.5)

## 2021-10-18 RX ORDER — LEVOTHYROXINE SODIUM 75 UG/1
TABLET ORAL
Qty: 90 TABLET | Refills: 0 | Status: SHIPPED | OUTPATIENT
Start: 2021-10-18 | End: 2021-12-14

## 2021-10-26 ENCOUNTER — TELEPHONE (OUTPATIENT)
Dept: FAMILY MEDICINE CLINIC | Age: 51
End: 2021-10-26

## 2021-10-26 DIAGNOSIS — M25.511 ACUTE PAIN OF RIGHT SHOULDER: Primary | ICD-10-CM

## 2021-10-26 NOTE — TELEPHONE ENCOUNTER
Pt states that she is wanting to get injections for her right shoulder that she states that she has had before. She has been talking with dr Nima kan and he sttaes that she will need a Xray before he can do that for her and she would like to know if she can go ahead and get the order for the Xray before her appt on 11/2/2021. So that the results will be back and that way she can get the injection at the appt. She also states that her insurance might be ending before the end of the month due to her  getting a new job and would like to know if there is anyway to get in before the end of the week to get the injection in case the insurance cancels. Please advise.

## 2021-10-27 NOTE — TELEPHONE ENCOUNTER
I will be out of the office on a conference until Tuesday so we cannot accommodate earlier. She can try to get into [facility] sooner. Dr. Alfie Osorio may be able to see her this week. I have ordered the film in case she wants to. I have copied Dr. Alfie Osorio in case he can accomadate her for likely subacromial injection. If she sees Dr. Alfie Osorio, she does not need to do my x-ray. He can determine if she needs films and believe they can do them in their office.

## 2021-10-28 ENCOUNTER — HOSPITAL ENCOUNTER (OUTPATIENT)
Dept: GENERAL RADIOLOGY | Age: 51
Discharge: HOME | End: 2021-10-28
Payer: COMMERCIAL

## 2021-10-28 DIAGNOSIS — M25.511 ACUTE PAIN OF RIGHT SHOULDER: ICD-10-CM

## 2021-10-28 PROCEDURE — 73030 X-RAY EXAM OF SHOULDER: CPT

## 2021-12-07 ENCOUNTER — OFFICE VISIT (OUTPATIENT)
Dept: FAMILY MEDICINE CLINIC | Age: 51
End: 2021-12-07
Payer: COMMERCIAL

## 2021-12-07 VITALS
SYSTOLIC BLOOD PRESSURE: 110 MMHG | HEART RATE: 77 BPM | OXYGEN SATURATION: 98 % | TEMPERATURE: 98 F | BODY MASS INDEX: 21.99 KG/M2 | RESPIRATION RATE: 14 BRPM | DIASTOLIC BLOOD PRESSURE: 72 MMHG | HEIGHT: 65 IN | WEIGHT: 132 LBS

## 2021-12-07 DIAGNOSIS — M06.9 RHEUMATOID ARTHRITIS, INVOLVING UNSPECIFIED SITE, UNSPECIFIED WHETHER RHEUMATOID FACTOR PRESENT (HCC): ICD-10-CM

## 2021-12-07 DIAGNOSIS — F32.A DEPRESSION, UNSPECIFIED DEPRESSION TYPE: ICD-10-CM

## 2021-12-07 DIAGNOSIS — C44.91 SKIN CANCER, BASAL CELL: ICD-10-CM

## 2021-12-07 DIAGNOSIS — E03.9 ACQUIRED HYPOTHYROIDISM: Primary | ICD-10-CM

## 2021-12-07 DIAGNOSIS — Z13.220 SCREENING CHOLESTEROL LEVEL: ICD-10-CM

## 2021-12-07 DIAGNOSIS — M75.51 SUBACROMIAL BURSITIS OF RIGHT SHOULDER JOINT: ICD-10-CM

## 2021-12-07 DIAGNOSIS — Z00.00 WELL ADULT EXAM: ICD-10-CM

## 2021-12-07 PROCEDURE — 20610 DRAIN/INJ JOINT/BURSA W/O US: CPT | Performed by: FAMILY MEDICINE

## 2021-12-07 PROCEDURE — 99214 OFFICE O/P EST MOD 30 MIN: CPT | Performed by: FAMILY MEDICINE

## 2021-12-07 RX ORDER — LIDOCAINE HYDROCHLORIDE 10 MG/ML
2 INJECTION INFILTRATION; PERINEURAL ONCE
Qty: 2 ML | Refills: 0
Start: 2021-12-07 | End: 2021-12-07

## 2021-12-07 RX ORDER — TRIAMCINOLONE ACETONIDE 40 MG/ML
40 INJECTION, SUSPENSION INTRA-ARTICULAR; INTRAMUSCULAR ONCE
Qty: 1 ML | Refills: 0
Start: 2021-12-07 | End: 2021-12-07

## 2021-12-07 NOTE — PROGRESS NOTES
1. \"Have you been to the ER, urgent care clinic since your last visit? Hospitalized since your last visit? \" No    2. \"Have you seen or consulted any other health care providers outside of the [address] [place] [address] since your last visit? \" No     3. For patients aged 39-70: Has the patient had a colonoscopy / FIT/ Cologuard? Yes, HM satisfied with blue hyperlink     If the patient is female:    4. For patients aged 41-77: Has the patient had a mammogram within the past 2 years? Yes, HM satisfied with blue hyperlink    5.  For patients aged 21-65: Has the patient had a pap smear? yes

## 2021-12-07 NOTE — PATIENT INSTRUCTIONS
Shoulder Bursitis: Care Instructions  Your Care Instructions     Bursitis is inflammation of the bursa. A bursa is a small sac of fluid that cushions a joint and helps it move easily. A bursa sits under the highest point of your shoulder. You can get bursitis by overusing your shoulder, which can happen with activities such as lifting, pitching a ball, or painting. Symptoms of bursitis include pain when you move your arm. Your arm may hurt when you try to lift it, and the pain can reach down the side of your arm. You may have trouble sleeping because of the pain. Bursitis usually gets better if you avoid the activity that caused it. If pain lasts or gets worse despite home treatment, your doctor may draw fluid from the bursa through a needle. This may relieve your pain and help your doctor know if you have an infection. If so, your doctor will prescribe antibiotics. If you have inflammation only, you may get a corticosteroid shot to reduce swelling and pain. Sometimes surgery is needed to drain or remove the bursa. Follow-up care is a key part of your treatment and safety. Be sure to make and go to all appointments, and call your doctor if you are having problems. It's also a good idea to know your test results and keep a list of the medicines you take. How can you care for yourself at home? · Put ice or a cold pack on your shoulder for 10 to 20 minutes at a time. Put a thin cloth between the ice and your skin. · After 3 days of using ice, use heat on your shoulder. You can use a hot water bottle, a heating pad set on low, or a warm, moist towel. Some doctors suggest alternating between hot and cold. · Rest your shoulder. Stop any activities that cause pain. Switch to activities that do not stress your shoulder. · Take your medicines exactly as prescribed. Call your doctor if you think you are having a problem with your medicine.   · If your doctor recommends it, take anti-inflammatory medicines to reduce pain. These include ibuprofen (Advil, Motrin) and naproxen (Aleve). Read and follow all instructions on the label. · To prevent stiffness, gently move the shoulder joint through its full range of motion. As the pain gets better, keep doing range-of-motion exercises. Ask your doctor for exercises that will make the muscles around the shoulder joint stronger. Do these as directed. · You can slowly return to the activity that caused the pain, but do it with less effort until you can do it without pain or swelling. Be sure to warm up before and stretch after you do the activity. When should you call for help? Call your doctor now or seek immediate medical care if:    · You have a fever.     · You have increased swelling or redness in your shoulder.     · You cannot use your shoulder, or the pain in your shoulder gets worse. Watch closely for changes in your health, and be sure to contact your doctor if:    · You have pain for 2 weeks or longer despite home treatment. Where can you learn more? Go to http://www.gray.com/  Enter M955 in the search box to learn more about \"Shoulder Bursitis: Care Instructions. \"  Current as of: July 1, 2021               Content Version: 13.0  © 2567-1334 [employer]. Care instructions adapted under license by [employer] (which disclaims liability or warranty for this information). If you have questions about a medical condition or this instruction, always ask your healthcare professional. [place] [address] any warranty or liability for your use of this information. Rotator Cuff Problems: Care Instructions  Overview     The rotator cuff is a group of tendons and muscles around the shoulder that keeps the shoulder joint stable. It is what allows you to raise and rotate your arm. Over time, daily wear and exercise can cause the tendons to rub on the bones of your shoulder. This is called impingement.  This condition may cause the tendons to bruise, degenerate, or tear. In many people, these problems do not cause pain. When they do cause pain, you can do things to reduce the pain and swelling. These include rest, physical therapy, ice and heat, and anti-inflammatory medicine. If you still have pain after trying these treatments, you and your doctor can discuss having a steroid injection or surgery. Follow-up care is a key part of your treatment and safety. Be sure to make and go to all appointments, and call your doctor if you are having problems. It's also a good idea to know your test results and keep a list of the medicines you take. How can you care for yourself at home? · Be safe with medicines. Read and follow all instructions on the label. ? If the doctor gave you a prescription medicine for pain, take it as prescribed. ? If you are not taking a prescription pain medicine, ask your doctor if you can take an over-the-counter medicine. · Put ice or a cold pack on your shoulder for 10 to 20 minutes at a time. Try to do this every 1 to 2 hours for the next 3 days (when you are awake). Put a thin cloth between the ice pack and your skin. · After 2 or 3 days, if you don't have swelling, apply heat. Put a warm water bottle, a heating pad set on low, or a warm cloth on your shoulder. Do not go to sleep with a heating pad on your skin. Put a thin cloth between the heating pad and your skin. While holding a warm cloth on your shoulder, lean forward so your arm hangs freely, and gently swing your arm back and forth like a pendulum. You also can do this standing under a warm shower. · Follow your doctor's advice for physical therapy. When your doctor says it is okay, try these stretching exercises. Do them slowly to avoid injury. Put a warm, wet towel on your shoulder before exercising. Stop any exercise that increases pain. ? Range-of-motion exercises.  If it is not too painful, stretch your arm in four directions: across the body, up the back, to the side, and overhead. ? Pendulum exercise. Lean forward and hold onto a table or the back of a chair with your good arm. Bend at the waist, letting the arm with the sore shoulder hang straight down. Swing your arm back and forth like a pendulum, then in circles that start small and slowly grow larger. This exercise does not use the arm muscles. Instead, use your legs and your hips to create movement that makes your arm swing freely. Try this for about 5 minutes, several times a day. ? Wall climbing (to the side). Stand with your side to a wall so that your fingers can just touch it. Then turn so your body is turned slightly toward the wall. Walk the fingers of your injured arm up the wall as high as pain permits. Try not to shrug your shoulder up toward your ear as you move your arm up. Hold that position for a count of 15 to 30 seconds. Walk your fingers down to the starting position. Repeat 2 to 4 times, trying to reach higher each time. ? Wall climbing (to the front). Face a wall, standing so your fingers can just touch it. Walk the fingers of your affected arm up the wall as high as pain permits. Try not to shrug your shoulder up toward your ear as you move your arm up. Hold that position for a count of 15 to 30 seconds. Slowly walk your fingers to the starting position. Repeat 2 to 4 times, trying to reach higher each time. · Rest your shoulder when you are not doing stretches and other exercises. Your doctor may tell you to wait for the pain to go away before doing exercises. Do not lift heavy bags of groceries, play sports, or do anything else that makes you twist or stress your shoulder. Avoid activities where you move your affected arm above your head. When should you call for help?    Call your doctor now or seek immediate medical care if:    · You have severe pain.     · You cannot move your shoulder or arm.     · You have tingling or numbness in your arm or hand.     · Your arm or hand is cool or pale. Watch closely for changes in your health, and be sure to contact your doctor if:    · Your pain gets worse.     · You have new or worse swelling in your arm or hand.     · You do not get better as expected. Where can you learn more? Go to http://www.Cel[employer]o/  Enter E207 in the search box to learn more about \"Rotator Cuff Problems: Care Instructions. \"  Current as of: July 1, 2021               Content Version: 13.0  © 2006-2021 [employer]. Care instructions adapted under license by [employer] (which disclaims liability or warranty for this information). If you have questions about a medical condition or this instruction, always ask your healthcare professional. [address] any warranty or liability for your use of this information.

## 2021-12-07 NOTE — PROGRESS NOTES
Chief Complaint   Patient presents with    Shoulder Pain     right shoulder pain      Subjective:   46 y.o. female for follow-up and shoulder pain. She is due in Jan/Feb for her routine but we will do that today for her convenience. Having recurrent right shoulder pain, now for several weeks. Feels same as last time when she had a subacromial injection. She had an MRI back then in 2015 showing RTC disease and mild arthritis. She had recent films to confirm nothing unusual going on. TSH is currently normal on synthroid 75mcg daily. Latest TSH was normal at rheum. Overall feeling well. She is on Wellbutrin for depression which she reports as well-controlled. She could not tolerate the higher dose of 300mg. Has RA and was seeing rheumatology regularly. She has successfully transitioned off plaquenil and has done so well that she will see rheum only if she has relapse. She sees dermatology due to her family history of melanoma in her sister and her own history of basal cell cancer. Objective: The patient appears well, alert, oriented x 3, in no distress. Visit Vitals  /72 (BP 1 Location: Left upper arm, BP Patient Position: Sitting, BP Cuff Size: Adult)   Pulse 77   Temp 98 °F (36.7 °C) (Temporal)   Resp 14   Ht 5' 5\" (1.651 m)   Wt 132 lb (59.9 kg)   LMP 06/24/2020   SpO2 98%   BMI 21.97 kg/m²     ENT normal.  No adenopathy or thyromegaly. Right shoulder:  Full ROM with lateral abduction being painful. There is pain with resisted supraspinatus testing. No pain or weakness with resisted internal or external ROM testing. Positive impingement. Psych: normal affect. Mood good. Oriented x 3. Judgement and insight intact. Assessment/Plan:       ICD-10-CM ICD-9-CM    1. Acquired hypothyroidism  E03.9 244.9 TSH 3RD GENERATION   2. Depression, unspecified depression type  F32. A 311    3.  Rheumatoid arthritis, involving unspecified site, unspecified whether rheumatoid factor present ([facility]  M06.9 714.0    4. Skin cancer, basal cell  C44.91 173.91    5. Subacromial bursitis of right shoulder joint  M75.51 726.19 TRIAMCINOLONE ACETONIDE INJ      triamcinolone acetonide (Kenalog) 40 mg/mL injection      lidocaine (XYLOCAINE) 10 mg/mL (1 %) injection      KS DRAIN/INJECT LARGE JOINT/BURSA   6. Well adult exam  Z00.00 V70.0 CBC WITH AUTOMATED DIFF      METABOLIC PANEL, COMPREHENSIVE   7. Screening cholesterol level  Z13.220 V77.91 LIPID PANEL     Hypothyroidism - autoimmune likely. TSH has been consistently normal.  Cont synthroid 75mcg qam.  Refills as needed. Check TSH in August.     Depression - continue Wellbutrin XL 150mg daily as this is working well. Refills as needed. RA - Continue rheumatology follow-up as needed at this point. Doing well off Plaquenil. Basal cell cancer with a family history of melanoma - cont regular derm exams. Right subacromial bursitis - reviewed prior MRI and latest xray. Proceeded with cortisone injection per procedure note. Fasting labs ordered for CPE. We did not perform a CPE / WWE. All chart history elements were reviewed by me at the time of the visit even though marked at time of note closure. Patient understands our medical plan. Patient has provided input and agrees with goals. Alternatives have been explained and offered. All questions answered. The patient is to call if condition worsens or fails to improve. Follow-up and Dispositions    · Return in about 8 months (around 8/7/2022) for physical. Fasting labs due 3-7 days prior to appointment.

## 2021-12-07 NOTE — PROGRESS NOTES
[facility]  OFFICE PROCEDURE PROGRESS NOTE      Chart reviewed for the following:   Mariel Thompson MD, have reviewed the History, Physical and updated the Allergic reactions for [address] performed immediately prior to start of procedure:   I, Raul Wiseman MD, have performed the following reviews on [address] [facility] prior to the start of the procedure:            * Patient was identified by name and date of birth   * Agreement on procedure being performed was verified  * Risks and Benefits explained to the patient  * Procedure site verified and marked as necessary  * Patient was positioned for comfort  * Consent was signed and verified     Time: 4:11pm      Date of procedure: 12/7/2021    Procedure performed by: Raul Wiseman MD    Provider assisted by: self    Patient assisted by: self    How tolerated by patient: tolerated the procedure well with no complications    Right subacromial shoulder injection  Patient was advised on risks associated with corticosteroid injection which include but are not limited to bleeding, infection, synovitis (flare reaction), tendon rupture, skin color changes, and fat atrophy. After the patient verbally consented, the injection site was cleaned in sterile fashion. The right shoulder was injected using kenalog 40mg/mL 1cc + 1% lidocaine 2cc (Lot#[id number], 1/1/2022) using the posterior approach. The injection was tolerated well with no complications. The patient was advised on the signs of infection and instructed to go to the ER or call the office if they are concerned about an infection. The patient was counseled on the signs of synovitis and instructed to ice as needed for 15 minutes every 2-3 hours and call the office.

## 2021-12-09 DIAGNOSIS — F32.A DEPRESSION, UNSPECIFIED DEPRESSION TYPE: ICD-10-CM

## 2021-12-14 DIAGNOSIS — F32.A DEPRESSION, UNSPECIFIED DEPRESSION TYPE: ICD-10-CM

## 2021-12-14 RX ORDER — BUPROPION HYDROCHLORIDE 150 MG/1
TABLET ORAL
Qty: 90 TABLET | Refills: 1 | Status: SHIPPED | OUTPATIENT
Start: 2021-12-14 | End: 2022-06-27

## 2021-12-14 RX ORDER — LEVOTHYROXINE SODIUM 75 UG/1
TABLET ORAL
Qty: 90 TABLET | Refills: 0 | Status: SHIPPED | OUTPATIENT
Start: 2021-12-14 | End: 2022-05-18

## 2022-02-24 ENCOUNTER — VIRTUAL VISIT (OUTPATIENT)
Dept: FAMILY MEDICINE CLINIC | Age: 52
End: 2022-02-24
Payer: COMMERCIAL

## 2022-02-24 DIAGNOSIS — M75.51 SUBACROMIAL BURSITIS OF RIGHT SHOULDER JOINT: ICD-10-CM

## 2022-02-24 DIAGNOSIS — S23.41XA SPRAIN OF COSTAL CARTILAGE, INITIAL ENCOUNTER: Primary | ICD-10-CM

## 2022-02-24 PROCEDURE — 99212 OFFICE O/P EST SF 10 MIN: CPT | Performed by: FAMILY MEDICINE

## 2022-02-24 RX ORDER — IBUPROFEN 200 MG
400 TABLET ORAL
COMMUNITY

## 2022-02-24 NOTE — PROGRESS NOTES
1. \"Have you been to the ER, urgent care clinic since your last visit? Hospitalized since your last visit? \" No    2. \"Have you seen or consulted any other health care providers outside of the [address] [place] [address] since your last visit? \" No     3. For patients aged 39-70: Has the patient had a colonoscopy / FIT/ Cologuard? Yes - no Care Gap present      If the patient is female:    4. For patients aged 41-77: Has the patient had a mammogram within the past 2 years? Yes - no Care Gap present      5. For patients aged 21-65: Has the patient had a pap smear?  Yes - no Care Gap present

## 2022-02-24 NOTE — PROGRESS NOTES
Iesha Bella, who was evaluated through a synchronous (real-time) audio-video encounter, and/or her healthcare decision maker, is aware that it is a billable service, which includes applicable co-pays, with coverage as determined by her insurance carrier. She provided verbal consent to proceed and patient identification was verified. This visit was conducted pursuant to the emergency declaration under the [facility] [address] and the [employer] and [employer] General Act. A caregiver was present when appropriate. Ability to conduct physical exam was limited. The patient was located at home in a state where the provider was licensed to provide care. SUBJECTIVE  Chief Complaint   Patient presents with    Rib Pain     rib sided rib pain after cleaning bathtub x 2 weeks ago; felt a \"pop\"; taking OTC Motrin PRN; worse at end of day     Shoulder Pain     s/p right shoulder cortisone injection in December 2021     2 weeks ago she was cleaning a bath tub. She felt a pop on the right side of her chest.  She says that she had pain when it happened. She says she was bent over and leaned up against a sharp point. She says that she did not have any bruising. She says that the pain has worsened. She says it is mild. Motrin helps her but as the day goes on and she moves a lot, it tends to hurt. Hurts to sneeze but not to take a deep breath. Twisting and side bending bothers her. No shortness of breath. Right shoulder pain despite a corticosteroid injection. Pain is back full force. OBJECTIVE    General:  Alert, cooperative, well appearing, in no apparent distress. Lungs: Inspiratory and expiratory efforts are full and unlabored. ASSESSMENT / PLAN    ICD-10-CM ICD-9-CM    1. Sprain of costal cartilage, initial encounter  S23.41XA 848.3    2.  Subacromial bursitis of right shoulder joint  M75.51 726.19      Rib pain - likely just a contusion or intercostal strain. Option of rib x-ray series. Or monitor until resolution. Usually these will take 4-6 weeks to resolve. If not better, call for imaging as she declines at this time. Right shoulder pain - she was given options and we thought a repeat injection in May would be best option if persists. All chart history elements were reviewed by me at the time of the visit even though marked at time of note closure. Patient understands our medical plan. Patient has provided input and agrees with goals. Alternatives have been explained and offered. All questions answered. The patient is to call if condition worsens or fails to improve. RTC as scheduled or in May as needed.

## 2022-03-08 ENCOUNTER — PATIENT MESSAGE (OUTPATIENT)
Dept: FAMILY MEDICINE CLINIC | Age: 52
End: 2022-03-08

## 2022-03-10 LAB — CREATININE, EXTERNAL: 0.84

## 2022-03-17 NOTE — TELEPHONE ENCOUNTER
Discussed with patient. Mild elevation similar to 2013 when she had autoimmune flare. She has no GI symptoms. Has additional labs in April. She is having labs for me in Aug.  We will see what to do if worsens or stays elevated - perhaps to start with liver imaging.

## 2022-03-17 NOTE — TELEPHONE ENCOUNTER
From: Chaya Ho  To: Juli Benjamin MD  Sent: 3/8/2022 9:10 AM EST  Subject: Rib pain    I wanted to let you know that the pain in my rib has gone away. Since speaking with you I have made an appointment with Dr Court Jones. I have been experiencing alot of pain in my joints especially on my right side. I wake with swollen hands etc... My shoulder still hurts but now it's both elbows, knees, wrist and hands. I know it will get under control, I just wanted you to know. I see her next Tues unless they get a cancelation before then.

## 2022-03-19 PROBLEM — F33.0 DEPRESSION, MAJOR, RECURRENT, MILD (HCC): Status: ACTIVE | Noted: 2019-06-10

## 2022-03-20 PROBLEM — Z80.8 FAMILY HISTORY OF MELANOMA: Status: ACTIVE | Noted: 2017-11-16

## 2022-03-20 PROBLEM — C44.91 SKIN CANCER, BASAL CELL: Status: ACTIVE | Noted: 2017-11-16

## 2022-05-18 RX ORDER — LEVOTHYROXINE SODIUM 75 UG/1
TABLET ORAL
Qty: 90 TABLET | Refills: 0 | Status: SHIPPED | OUTPATIENT
Start: 2022-05-18 | End: 2022-08-09 | Stop reason: SDUPTHER

## 2022-06-08 ENCOUNTER — OFFICE VISIT (OUTPATIENT)
Dept: FAMILY MEDICINE CLINIC | Age: 52
End: 2022-06-08
Payer: COMMERCIAL

## 2022-06-08 VITALS
OXYGEN SATURATION: 98 % | BODY MASS INDEX: 21.49 KG/M2 | RESPIRATION RATE: 16 BRPM | TEMPERATURE: 98.4 F | HEART RATE: 77 BPM | HEIGHT: 65 IN | DIASTOLIC BLOOD PRESSURE: 70 MMHG | SYSTOLIC BLOOD PRESSURE: 108 MMHG | WEIGHT: 129 LBS

## 2022-06-08 DIAGNOSIS — M75.51 SUBACROMIAL BURSITIS OF RIGHT SHOULDER JOINT: Primary | ICD-10-CM

## 2022-06-08 PROCEDURE — 20610 DRAIN/INJ JOINT/BURSA W/O US: CPT | Performed by: FAMILY MEDICINE

## 2022-06-08 PROCEDURE — 99212 OFFICE O/P EST SF 10 MIN: CPT | Performed by: FAMILY MEDICINE

## 2022-06-08 RX ORDER — LIDOCAINE HYDROCHLORIDE 10 MG/ML
3 INJECTION INFILTRATION; PERINEURAL ONCE
Qty: 3 ML | Refills: 0
Start: 2022-06-08 | End: 2022-06-08

## 2022-06-08 RX ORDER — HYDROXYCHLOROQUINE SULFATE 200 MG/1
200 TABLET, FILM COATED ORAL DAILY
COMMUNITY
Start: 2022-05-24

## 2022-06-08 RX ORDER — TRIAMCINOLONE ACETONIDE 40 MG/ML
40 INJECTION, SUSPENSION INTRA-ARTICULAR; INTRAMUSCULAR ONCE
Qty: 1 ML | Refills: 0
Start: 2022-06-08 | End: 2022-06-08

## 2022-06-08 NOTE — PROGRESS NOTES
[facility]  OFFICE PROCEDURE PROGRESS NOTE      Chart reviewed for the following:   Beto Mishra MD, have reviewed the History, Physical and updated the Allergic reactions for [address] performed immediately prior to start of procedure:   I, Gurmeet Saeed MD, have performed the following reviews on [address] [facility] prior to the start of the procedure:            * Patient was identified by name and date of birth   * Agreement on procedure being performed was verified  * Risks and Benefits explained to the patient  * Procedure site verified and marked as necessary  * Patient was positioned for comfort  * Consent was signed and verified     Time: 3:50pm      Date of procedure: 6/8/2022    Procedure performed by: Gurmeet Saeed MD    Provider assisted by: self    Patient assisted by: self    How tolerated by patient: tolerated the procedure well with no complications    Right subacromial shoulder injection  Patient was advised on risks associated with corticosteroid injection which include but are not limited to bleeding, infection, synovitis (flare reaction), tendon rupture, skin color changes, and fat atrophy. After the patient verbally consented, the injection site was cleaned in sterile fashion. The right shoulder was injected using kenalog 40mg/mL 1cc + 1% lidocaine 2cc ([facility]#[id number], 11/1/2024) using the posterior approach. The injection was tolerated well with no complications. The patient was advised on the signs of infection and instructed to go to the ER or call the office if they are concerned about an infection. The patient was counseled on the signs of synovitis and instructed to ice as needed for 15 minutes every 2-3 hours and call the office.

## 2022-06-08 NOTE — PROGRESS NOTES
Chief Complaint   Patient presents with    Shoulder Pain     c/o right shoulder pain (possible cortisone injection)          1. \"Have you been to the ER, urgent care clinic since your last visit? Hospitalized since your last visit? \" No    2. \"Have you seen or consulted any other health care providers outside of the [address] [place] [address] since your last visit? \" Yes 02- rheumatology routine care      3. For patients aged 39-70: Has the patient had a colonoscopy / FIT/ Cologuard? Yes - no Care Gap present      If the patient is female:    4. For patients aged 41-77: Has the patient had a mammogram within the past 2 years? Yes - no Care Gap present      5. For patients aged 21-65: Has the patient had a pap smear?  Yes - no Care Gap present

## 2022-06-08 NOTE — PROGRESS NOTES
Chief Complaint   Patient presents with    Shoulder Pain     c/o right shoulder pain (possible cortisone injection)       Subjective:   46 y.o. female for follow-up and shoulder pain. Having persistent right shoulder pain. Last subacromial injection in 12/2021. Pain not as bad but persistent. Bad at night when sleeping on it and with overhead lifting. She had an MRI back then in 2015 showing RTC disease and mild arthritis. She had recent films to confirm nothing unusual going on. Objective: The patient appears well, alert, oriented x 3, in no distress. Visit Vitals  /70 (BP 1 Location: Left arm, BP Patient Position: Sitting, BP Cuff Size: Large adult)   Pulse 77   Temp 98.4 °F (36.9 °C) (Temporal)   Resp 16   Ht 5' 5\" (1.651 m)   Wt 129 lb (58.5 kg)   LMP 06/24/2020   SpO2 98%   BMI 21.47 kg/m²     Right shoulder:  Full ROM with lateral abduction being painful. There is pain with resisted supraspinatus testing. No pain or weakness with resisted internal or external ROM testing. Positive impingement. Skin: no overlying rashes over injection area. Assessment/Plan:       ICD-10-CM ICD-9-CM    1. Subacromial bursitis of right shoulder joint  M75.51 726.19 TRIAMCINOLONE ACETONIDE INJ      triamcinolone acetonide (Kenalog) 40 mg/mL injection      lidocaine (XYLOCAINE) 10 mg/mL (1 %) injection      IN DRAIN/INJECT LARGE JOINT/BURSA     Right subacromial bursitis - Proceeded with cortisone injection per procedure note. All chart history elements were reviewed by me at the time of the visit even though marked at time of note closure. Patient understands our medical plan. Patient has provided input and agrees with goals. Alternatives have been explained and offered. All questions answered. The patient is to call if condition worsens or fails to improve. Follow-up and Dispositions    · Return for as scheduled.

## 2022-06-27 DIAGNOSIS — F32.A DEPRESSION, UNSPECIFIED DEPRESSION TYPE: ICD-10-CM

## 2022-06-27 RX ORDER — BUPROPION HYDROCHLORIDE 150 MG/1
TABLET ORAL
Qty: 90 TABLET | Refills: 1 | Status: SHIPPED | OUTPATIENT
Start: 2022-06-27

## 2022-08-06 ENCOUNTER — HOSPITAL ENCOUNTER (OUTPATIENT)
Dept: LAB | Age: 52
Discharge: HOME | End: 2022-08-06

## 2022-08-06 LAB — XX-LABCORP SPECIMEN COL,LCBCF: NORMAL

## 2022-08-06 PROCEDURE — 99001 SPECIMEN HANDLING PT-LAB: CPT

## 2022-08-07 LAB
ALBUMIN SERPL-MCNC: 3.8 G/DL (ref 3.8–4.9)
ALBUMIN/GLOB SERPL: 1.1 {RATIO} (ref 1.2–2.2)
ALP SERPL-CCNC: 107 IU/L (ref 44–121)
ALT SERPL-CCNC: 15 IU/L (ref 0–32)
AST SERPL-CCNC: 20 IU/L (ref 0–40)
BASOPHILS # BLD AUTO: 0 X10E3/UL (ref 0–0.2)
BASOPHILS NFR BLD AUTO: 0 %
BILIRUB SERPL-MCNC: <0.2 MG/DL (ref 0–1.2)
BUN SERPL-MCNC: 13 MG/DL (ref 6–24)
BUN/CREAT SERPL: 14 (ref 9–23)
CALCIUM SERPL-MCNC: 9 MG/DL (ref 8.7–10.2)
CHLORIDE SERPL-SCNC: 107 MMOL/L (ref 96–106)
CHOLEST SERPL-MCNC: 157 MG/DL (ref 100–199)
CO2 SERPL-SCNC: 25 MMOL/L (ref 20–29)
CREAT SERPL-MCNC: 0.9 MG/DL (ref 0.57–1)
EGFR: 76 ML/MIN/1.73
EOSINOPHIL # BLD AUTO: 0.1 X10E3/UL (ref 0–0.4)
EOSINOPHIL NFR BLD AUTO: 2 %
ERYTHROCYTE [DISTWIDTH] IN BLOOD BY AUTOMATED COUNT: 13.1 % (ref 11.7–15.4)
GLOBULIN SER CALC-MCNC: 3.5 G/DL (ref 1.5–4.5)
GLUCOSE SERPL-MCNC: 84 MG/DL (ref 65–99)
HCT VFR BLD AUTO: 38.5 % (ref 34–46.6)
HDLC SERPL-MCNC: 61 MG/DL
HGB BLD-MCNC: 12.6 G/DL (ref 11.1–15.9)
IMM GRANULOCYTES # BLD AUTO: 0 X10E3/UL (ref 0–0.1)
IMM GRANULOCYTES NFR BLD AUTO: 0 %
IMP & REVIEW OF LAB RESULTS: NORMAL
LDLC SERPL CALC-MCNC: 81 MG/DL (ref 0–99)
LYMPHOCYTES # BLD AUTO: 1.1 X10E3/UL (ref 0.7–3.1)
LYMPHOCYTES NFR BLD AUTO: 23 %
MCH RBC QN AUTO: 29.1 PG (ref 26.6–33)
MCHC RBC AUTO-ENTMCNC: 32.7 G/DL (ref 31.5–35.7)
MCV RBC AUTO: 89 FL (ref 79–97)
MONOCYTES # BLD AUTO: 0.4 X10E3/UL (ref 0.1–0.9)
MONOCYTES NFR BLD AUTO: 9 %
NEUTROPHILS # BLD AUTO: 3.1 X10E3/UL (ref 1.4–7)
NEUTROPHILS NFR BLD AUTO: 66 %
PLATELET # BLD AUTO: 245 X10E3/UL (ref 150–450)
POTASSIUM SERPL-SCNC: 4.6 MMOL/L (ref 3.5–5.2)
PROT SERPL-MCNC: 7.3 G/DL (ref 6–8.5)
RBC # BLD AUTO: 4.33 X10E6/UL (ref 3.77–5.28)
SODIUM SERPL-SCNC: 144 MMOL/L (ref 134–144)
TRIGL SERPL-MCNC: 80 MG/DL (ref 0–149)
TSH SERPL DL<=0.005 MIU/L-ACNC: 4.96 UIU/ML (ref 0.45–4.5)
VLDLC SERPL CALC-MCNC: 15 MG/DL (ref 5–40)
WBC # BLD AUTO: 4.7 X10E3/UL (ref 3.4–10.8)

## 2022-08-09 ENCOUNTER — OFFICE VISIT (OUTPATIENT)
Dept: FAMILY MEDICINE CLINIC | Age: 52
End: 2022-08-09
Payer: COMMERCIAL

## 2022-08-09 VITALS
SYSTOLIC BLOOD PRESSURE: 120 MMHG | BODY MASS INDEX: 21.33 KG/M2 | WEIGHT: 128 LBS | HEART RATE: 85 BPM | HEIGHT: 65 IN | TEMPERATURE: 98 F | RESPIRATION RATE: 16 BRPM | OXYGEN SATURATION: 99 % | DIASTOLIC BLOOD PRESSURE: 70 MMHG

## 2022-08-09 DIAGNOSIS — C44.91 SKIN CANCER, BASAL CELL: ICD-10-CM

## 2022-08-09 DIAGNOSIS — E03.9 ACQUIRED HYPOTHYROIDISM: ICD-10-CM

## 2022-08-09 DIAGNOSIS — F32.A DEPRESSION, UNSPECIFIED DEPRESSION TYPE: ICD-10-CM

## 2022-08-09 DIAGNOSIS — M06.9 RHEUMATOID ARTHRITIS, INVOLVING UNSPECIFIED SITE, UNSPECIFIED WHETHER RHEUMATOID FACTOR PRESENT (HCC): ICD-10-CM

## 2022-08-09 DIAGNOSIS — Z00.00 WELL ADULT EXAM: Primary | ICD-10-CM

## 2022-08-09 DIAGNOSIS — Z12.31 ENCOUNTER FOR SCREENING MAMMOGRAM FOR MALIGNANT NEOPLASM OF BREAST: ICD-10-CM

## 2022-08-09 DIAGNOSIS — D64.9 ANEMIA, UNSPECIFIED TYPE: ICD-10-CM

## 2022-08-09 PROCEDURE — 99396 PREV VISIT EST AGE 40-64: CPT | Performed by: FAMILY MEDICINE

## 2022-08-09 RX ORDER — LEVOTHYROXINE SODIUM 88 UG/1
TABLET ORAL
Qty: 90 TABLET | Refills: 0 | Status: SHIPPED | OUTPATIENT
Start: 2022-08-09 | End: 2022-10-14 | Stop reason: SDUPTHER

## 2022-08-09 NOTE — PROGRESS NOTES
Chief Complaint   Patient presents with    Physical    Results     Review of results         1. \"Have you been to the ER, urgent care clinic since your last visit? Hospitalized since your last visit? \" No    2. \"Have you seen or consulted any other health care providers outside of the [address] [place] [address] since your last visit? \" No     3. For patients aged 39-70: Has the patient had a colonoscopy / FIT/ Cologuard? Yes - no Care Gap present      If the patient is female:    4. For patients aged 41-77: Has the patient had a mammogram within the past 2 years? Yes - no Care Gap present      5. For patients aged 21-65: Has the patient had a pap smear?  Yes - no Care Gap present

## 2022-08-11 NOTE — PROGRESS NOTES
Chief Complaint   Patient presents with    Physical    Results     Review of results      Subjective:   46 y.o. female for Well Woman Check. Her gyne and breast care is done elsewhere by her Ob-Gyn physician. Current Outpatient Medications   Medication Sig Dispense Refill    levothyroxine (SYNTHROID) 88 mcg tablet take 1 tablet by mouth every morning ON AN EMPTY STOMACH 90 Tablet 0    buPROPion XL (WELLBUTRIN XL) 150 mg tablet take 1 tablet by mouth once daily 90 Tablet 1    hydrOXYchloroQUINE (PLAQUENIL) 200 mg tablet Take 200 mg by mouth daily. ibuprofen (MOTRIN) 200 mg tablet Take 400 mg by mouth every eight (8) hours as needed for Pain.      multivitamin (ONE A DAY) tablet Take 1 Tab by mouth daily. No Known Allergies  Past Medical History:   Diagnosis Date    Arthritis     Basal cell carcinoma of skin 2017    sees derm q6 months    Hypothyroid     Migraine 1/4/2010    Positive MARLI (antinuclear antibody) 5/2013    RA (rheumatoid arthritis) ([facility]     sees Dr. Coletta Meigs as needed. Check sed rate and CRP q6 months    S/P colonoscopy 01/24/2019    Dr. Loren Ruby; normal; f/u 10 years; random colon biopy-unremarkable colonic mucosa      Past Surgical History:   Procedure Laterality Date    HX APPENDECTOMY      HX BREAST AUGMENTATION  7115 (replaced silicone with saline)    x2    HX TUBAL LIGATION       Family History   Problem Relation Age of Onset    Migraines Mother     Stroke Mother     Cancer Sister         melanoma, skin    Hypertension Maternal Grandmother     Diabetes Maternal Grandmother     Colon Cancer Maternal Grandfather     Hypertension Maternal Grandfather     Diabetes Maternal Grandfather     Other Son         ADD     Social History     Tobacco Use    Smoking status: Never    Smokeless tobacco: Never   Substance Use Topics    Alcohol use: Yes     Comment: 1-2 (rare)      ROS: neg other than HPI    Specific concerns today:  No new complaints other than a recent fall. Objective:    The patient appears well, alert, oriented x 3, in no distress. Visit Vitals  /70 (BP 1 Location: Right arm, BP Patient Position: Sitting, BP Cuff Size: Adult)   Pulse 85   Temp 98 °F (36.7 °C) (Temporal)   Resp 16   Ht 5' 5\" (1.651 m)   Wt 128 lb (58.1 kg)   LMP 06/24/2020   SpO2 99%   BMI 21.30 kg/m²     ENT normal.  Neck supple. No adenopathy or thyromegaly. DANIAL. Lungs are clear, good air entry, no wheezes, rhonchi or rales. S1 and S2 normal, no murmurs, regular rate and rhythm. Abdomen soft without tenderness, guarding, mass or organomegaly. Extremities show no edema. Neurological is normal, no focal findings. Breast and Pelvic exams are deferred. Latest Reference Range & Units 8/6/22 00:00   WBC 3.4 - 10.8 x10E3/uL 4.7   RBC 3.77 - 5.28 x10E6/uL 4.33   HGB 11.1 - 15.9 g/dL 12.6   HCT 34.0 - 46.6 % 38.5   MCV 79 - 97 fL 89   MCH 26.6 - 33.0 pg 29.1   MCHC 31.5 - 35.7 g/dL 32.7   RDW 11.7 - 15.4 % 13.1   PLATELET 730 - 601 Z35W7/   NEUTROPHILS Not Estab. % 66   Lymphocytes Not Estab. % 23   MONOCYTES Not Estab. % 9   EOSINOPHILS Not Estab. % 2   BASOPHILS Not Estab. % 0   IMMATURE GRANULOCYTES Not Estab. % 0   ABS. NEUTROPHILS 1.4 - 7.0 x10E3/uL 3.1   ABS. IMM. GRANS. 0.0 - 0.1 x10E3/uL 0.0   Abs Lymphocytes 0.7 - 3.1 x10E3/uL 1.1   ABS. MONOCYTES 0.1 - 0.9 x10E3/uL 0.4   ABS. EOSINOPHILS 0.0 - 0.4 x10E3/uL 0.1   ABS. BASOPHILS 0.0 - 0.2 x10E3/uL 0.0   Sodium 134 - 144 mmol/L 144   Potassium 3.5 - 5.2 mmol/L 4.6   Chloride 96 - 106 mmol/L 107 (H)   CO2 20 - 29 mmol/L 25   Glucose 65 - 99 mg/dL 84   BUN 6 - 24 mg/dL 13   Creatinine 0.57 - 1.00 mg/dL 0.90   BUN/Creatinine ratio 9 - 23  14   Calcium 8.7 - 10.2 mg/dL 9.0   eGFR >59 mL/min/1.73 76   Bilirubin, total 0.0 - 1.2 mg/dL <0.2   Protein, total 6.0 - 8.5 g/dL 7.3   Albumin 3.8 - 4.9 g/dL 3.8   A-G Ratio 1.2 - 2.2  1.1 (L)   ALT 0 - 32 IU/L 15   AST 0 - 40 IU/L 20   Alk.  phosphatase 44 - 121 IU/L 107   Triglyceride 0 - 149 mg/dL 80 Cholesterol, total 100 - 199 mg/dL 157   HDL Cholesterol >39 mg/dL 61   LDL, calculated 0 - 99 mg/dL 81   VLDL, calculated 5 - 40 mg/dL 15   TSH 0.450 - 4.500 uIU/mL 4.960 (H)   (H): Data is abnormally high  (L): Data is abnormally low    Assessment/Plan:       ICD-10-CM ICD-9-CM    1. Well adult exam  Z00.00 V70.0       2. Acquired hypothyroidism  E03.9 244.9 TSH 3RD GENERATION      3. Anemia, unspecified type  D64.9 285.9 FERRITIN      VITAMIN B12      4. Depression, unspecified depression type  F32. A 311       5. Rheumatoid arthritis, involving unspecified site, unspecified whether rheumatoid factor present ([facility]  M06.9 714.0       6. Skin cancer, basal cell  C44.91 173.91       7. Encounter for screening mammogram for malignant neoplasm of breast  Z12.31 V76.12 RIDDHI MAMMO BI SCREENING INCL CAD        Age and sex specific counseling. Hypothyroidism - autoimmune likely. TSH a bit on high side. In to synthroid 88mcg qam.  Refills as needed. Check TSH in 6-8 weeks and call for results. Anemia - cont MVI. Check levels next set of labs. Depression - continue Wellbutrin XL 150mg daily as this is working well. Refills as needed. RA - Continue rheumatology follow-up. Regular eye exams on Plaquenil. Basal cell cancer with a family history of melanoma - cont regular derm exams. Mammogram ordered. She wants to get this done at [facility]. All chart history elements were reviewed by me at the time of the visit even though marked at time of note closure. Patient understands our medical plan. Patient has provided input and agrees with goals. Alternatives have been explained and offered. All questions answered. The patient is to call if condition worsens or fails to improve. Follow-up and Dispositions    Return in about 6 months (around 2/9/2023) for routine care and non-fasting labs to be completed in October of 2022.

## 2022-10-12 ENCOUNTER — HOSPITAL ENCOUNTER (OUTPATIENT)
Dept: LAB | Age: 52
Discharge: HOME | End: 2022-10-12

## 2022-10-12 LAB — XX-LABCORP SPECIMEN COL,LCBCF: NORMAL

## 2022-10-12 PROCEDURE — 99001 SPECIMEN HANDLING PT-LAB: CPT

## 2022-10-13 LAB
FERRITIN SERPL-MCNC: 196 NG/ML (ref 15–150)
TSH SERPL DL<=0.005 MIU/L-ACNC: 0.27 UIU/ML (ref 0.45–4.5)
VIT B12 SERPL-MCNC: 804 PG/ML (ref 232–1245)

## 2022-10-14 ENCOUNTER — PATIENT MESSAGE (OUTPATIENT)
Dept: FAMILY MEDICINE CLINIC | Age: 52
End: 2022-10-14

## 2022-10-14 RX ORDER — LEVOTHYROXINE SODIUM 75 UG/1
75 TABLET ORAL
Qty: 90 TABLET | Refills: 1 | Status: SHIPPED | OUTPATIENT
Start: 2022-10-14

## 2022-10-14 RX ORDER — DIAPER,BRIEF,INFANT-TODD,DISP
1 EACH MISCELLANEOUS DAILY
COMMUNITY

## 2022-10-14 RX ORDER — OMEGA-3S/DHA/EPA/FISH OIL/D3 300MG-1000
CAPSULE ORAL
COMMUNITY

## 2022-10-14 NOTE — PROGRESS NOTES
Chief Complaint   Patient presents with    Thyroid Problem    Depression    Arthritis     [facility]     Headache     for last week       Subjective:   44 y.o. female for follow-up. New complaint - has had a headache radiating from neck to top of head. She is wondering if new MVI that she is taking, one for hair and skin and the other for energy and metabolism. Had some stress over the holidays in relation to work. TSH is currently normal on synthroid 75mcg daily. Latest TSH was normal at rheum. Overall feeling well. She is on Wellbutrin for depression which she reports as well-controlled. Has RA and sees rheumatology regularly and has no complaints. Sees eye doctor every 6 months due to being on plaquenil however she is transitioning off since she has done so well. She sees [facility] due to her family history of melanoma in her sister. She herself has had basal cell cancer. Objective: The patient appears well, alert, oriented x 3, in no distress. Visit Vitals  /86 (BP 1 Location: Left arm, BP Patient Position: Sitting)   Pulse 75   Temp 98.5 °F (36.9 °C) (Oral)   Resp 16   Ht 5' 5\" (1.651 m)   Wt 144 lb (65.3 kg)   LMP 12/23/2019   SpO2 98%   BMI 23.96 kg/m²     ENT normal.  No adenopathy or thyromegaly. Chest: Clear, no w/r/. Heart:  Normal S1S2, RRR, no murmurs. Neuro:  CN2-12 intact. No deficits. Psych: normal affect. Mood good. Oriented x 3. Judgement and insight intact. Labs from [facility] reviewed and scanned. Assessment/Plan:       ICD-10-CM ICD-9-CM    1. Acquired hypothyroidism E03.9 244.9 TSH 3RD GENERATION   2. Depression, unspecified depression type F32.9 311    3. Skin cancer, basal cell C44.91 173.91    4. Rheumatoid arthritis, involving unspecified site, unspecified rheumatoid factor presence (HCC) M06.9 714.0    5. Intractable episodic tension-type headache G44.211 339.11      Reviewed labs.     Hypothyroidism - Please review. Protocol passed but only seen once. Requested Prescriptions   Pending Prescriptions Disp Refills    ESCITALOPRAM 10 MG Oral Tab [Pharmacy Med Name: ESCITALOPRAM 10 MG TABLET] 27 tablet 0     Sig: Take 0.5 tablets (5 mg total) by mouth daily for 7 days, THEN 1 tablet (10 mg total) daily for 23 days.         Psychiatric Non-Scheduled (Anti-Anxiety) Passed - 10/14/2022  9:39 AM        Passed - In person appointment or virtual visit in the past 6 mos or appointment in next 3 mos       Recent Outpatient Visits              3 weeks ago Lower extremity edema    [address] [facility] Jasmin Amato MD    Office Visit    1 year ago Pain of upper abdomen    Gastroenterology - Henry Reyez MD    Office Visit    1 year ago Adhesive capsulitis of left shoulder    [address] Eva Bahena MD    Office Visit    1 year ago Chronic left shoulder pain    [employer], [employer], [address], [place], [place], [facility]    Office Visit    1 year ago Headache disorder    [employer], [facility] [address], [place], [place], [place]    Office Visit     Future Appointments         Provider Department Appt Notes    Today Maria Teresa Gage, L[address] Hematology Oncology CONSULT Chronic deep vein thrombosis (DVT) of distal vein of left lower extremity Jasmin Amato MD REF. José Luis Underwood    In 2 weeks Viri Bañuelos MD [address] [facility] 6 wk fu    In 1 month Pr[facility]h [facility], , [address], [place] abdominal pain                   LOSARTAN 48 MG Oral Tab [place] Med Name: LOSARTAN POTASSIUM 50 MG TAB] 30 tablet 1     Sig: TAKE 1 TABLET BY MOUTH EVERY DAY        Hypertensive Medications Protocol Passed - 10/14/2022  9:39 AM        Passed - In person appointment in the past 12 or next 3 months       Recent Outpatient Visits              3 weeks ago Lower autoimmune likely. TSH is normal.  Cont synthroid 75mcg qam.  Check TSH in 6 months. Depression - continue Wellbutrin XL 150mg daily as this is working well. Basal cell cancer with a family history of melanoma - cont regular derm exams. RA - Continue rheumatology follow-up. Hopefully she does well off Plaquenil. Headache - likely tension-type. Work on heat, massage, neck exercise. Can try to go off MVI for now. All chart history elements were reviewed by me at the time of the visit even though marked at time of note closure. Patient understands our medical plan. Patient has provided input and agrees with goals. Alternatives have been explained and offered. All questions answered. The patient is to call if condition worsens or fails to improve.      Follow-up and Dispositions    · Return in about 6 months (around 7/6/2020) for routine care with Dr. Tatiana Manriquez (labs due prior) and in 1 month with Erin Malcolm for well woman exam. extremity edema    [address] Dante Arevalo MD    Office Visit    1 year ago Pain of upper abdomen    Gastroenterology - Samuel Ross, Matt Lopez MD    Office Visit    1 year ago Adhesive capsulitis of left shoulder    [facility] - Orthopedics Alysa Garcia MD    Office Visit    1 year ago Chronic left shoulder pain    [address], [address] [facility], [facility], [place], [place]    Office Visit    1 year ago Headache disorder    [address], [address] [facility], [address], [facility]    Office Visit     Future Appointments         Provider Department Appt Notes    Today Zina Rizo, L[address] Hematology Oncology CONSULT Chronic deep vein thrombosis (DVT) of distal vein of left lower extremity Dante Arevalo MD REF. Juana Powell    In 2 weeks Gina Thapa, [place] [address] [facility] Family Medicine 6 wk fu    In 1 month Tanner Jones, , [address] [facility], [place] abdominal pain                Passed - Last BP reading less than 140/90     BP Readings from Last 1 Encounters:  10/11/22 : 136/72                Passed - CMP or BMP in past 6 months     Recent Results (from the past 4392 hour(s))   Comp Metabolic Panel (14)    Collection Time: 10/11/22 11:52 AM   Result Value Ref Range    Glucose 95 70 - 99 mg/dL    Sodium 140 136 - 145 mmol/L    Potassium 3.9 3.5 - 5.1 mmol/L    Chloride 107 98 - 112 mmol/L    CO2 26.0 21.0 - 32.0 mmol/L    Anion Gap 7 0 - 18 mmol/L    BUN 12 7 - 18 mg/dL    Creatinine 0.86 0.55 - 1.02 mg/dL    Calcium, Total 9.6 8.5 - 10.1 mg/dL    Calculated Osmolality 290 275 - 295 mOsm/kg    eGFR-Cr 80 >=60 mL/min/1.73m2    AST 21 15 - 37 U/L    ALT 28 13 - 56 U/L    Alkaline Phosphatase 70 41 - 108 U/L    Bilirubin, Total 0.4 0.1 - 2.0 mg/dL    Total Protein 7.7 6.4 - 8.2 g/dL    Albumin 3.8 3.4 - 5.0 g/dL    Globulin  3.9 2.8 - 4.4 g/dL    A/G Ratio 1.0 1.0 - 2.0     *Note: Due to a large number of results and/or encounters for the requested time period, some results have not been displayed. A complete set of results can be found in Results Review.                  Passed - In person appointment or virtual visit in the past 6 months       Recent Outpatient Visits              3 weeks ago Lower extremity edema    [address] [facility] Stacie Araiza MD    Office Visit    1 year ago Pain of upper abdomen    Gastroenterology - David Leal MD    Office Visit    1 year ago Adhesive capsulitis of left shoulder    [facility] - Orthopedics Sylvester Eddy MD    Office Visit    1 year ago Chronic left shoulder pain    [employer], [address] [employer] [address], [place], [place], [facility]    Office Visit    1 year ago Headache disorder    [employer], [address], [place], [place], [place]    Office Visit     Future Appointments         Provider Department Appt Notes    Today Cesar Gunn, L[address] Hematology Oncology CONSULT Chronic deep vein thrombosis (DVT) of distal vein of left lower extremity Stacie Araiza MD REF. Agatha Bread    In 2 weeks Minal Barcenas MD [address] [facility] 6 wk fu    In 1 month Lorrene Simpers, GH, [address] abdominal pain                Passed - EGFRCR or GFRNAA > 50     GFR Evaluation  EGFRCR: 80 , resulted on 10/11/2022                  Recent Outpatient Visits              3 weeks ago Lower extremity edema    [address] [facility] Stacie Araiza MD    Office Visit    1 year ago Pain of upper abdomen    Gastroenterology - David Leal MD    Office Visit    1 year ago Adhesive capsulitis of left shoulder    [facility] - Orthopedics Sylvester Eddy MD    Office Visit    1 year ago Chronic left shoulder pain    [facility] [place], [place]    Office Visit    1 year ago Headache disorder    [address], [address] [employer], [place]    Office Visit            Future Appointments         Provider Department Appt Notes    Today Lili Davison 19 Hematology Oncology CONSULT Chronic deep vein thrombosis (DVT) of distal vein of left lower extremity Yulissa Velez MD REF. Psychiatric    In 2 weeks Zac Overall, Kayla Mcgowan MD [address] [facility] 6 wk fu    In 1 month SHAWN Moran, David Warner abdominal pain

## 2022-10-14 NOTE — TELEPHONE ENCOUNTER
From: Wendi Del Cid  To: Yessi Freeman MD  Sent: 10/14/2022 7:28 AM EDT  Subject: Recent Lab results    I have received my recent lab results. I wanted you to know that I have recently added two vitamins to my daily regimen. I currently take one a day womens 50+ daily vitamin, biotin 10,000 mcg, hair skin and nails and fish oil 1400 mg..  The extra biotin has helped my hair loss and helps my nails. Fish oil was added for eye health since I have dry eye. Not sure if this has caused my labs to be off. I am still experiencing alot of right arm & hand pain daily. Pretty tired in the evenings too. 0

## 2022-10-26 DIAGNOSIS — E03.9 ACQUIRED HYPOTHYROIDISM: Primary | ICD-10-CM

## 2022-12-01 DIAGNOSIS — F32.A DEPRESSION, UNSPECIFIED DEPRESSION TYPE: ICD-10-CM

## 2022-12-01 RX ORDER — BUPROPION HYDROCHLORIDE 150 MG/1
150 TABLET ORAL DAILY
Qty: 90 TABLET | Refills: 1 | Status: SHIPPED | OUTPATIENT
Start: 2022-12-01

## 2023-01-05 ENCOUNTER — TRANSCRIBE ORDER (OUTPATIENT)
Dept: SCHEDULING | Age: 53
End: 2023-01-05

## 2023-01-05 DIAGNOSIS — R80.1 PERSISTENT PROTEINURIA: Primary | ICD-10-CM

## 2023-01-10 ENCOUNTER — TELEPHONE (OUTPATIENT)
Dept: FAMILY MEDICINE CLINIC | Age: 53
End: 2023-01-10

## 2023-01-10 DIAGNOSIS — R03.0 ELEVATED BLOOD PRESSURE READING: Primary | ICD-10-CM

## 2023-01-10 NOTE — TELEPHONE ENCOUNTER
Says that she has been having higher blood pressure. She was seeing a nephrologist and has had a kidney ultrasound. She has had some anxiety about home BPs. They are elevated. BPs are in the 140s-150s/80-90s at home. At nephrology it was 140/88. She has been repeatedly checking BPs at home. She got nauseated last night after dinner and then vomited a bit. She says she woke up with chest pains described as discomfort similar to her RA chest pains. Says she went to work all day. She had some intermittent chest tightness. No radiation to arm or jaw. Not worse with activity. She says that it is slightly painful to deep breath. It is located substernally. No dyspnea. She is having a lot of anxiety she says about her BP and worrying about stroke etc.     No family history of heart disease. If chest pain worsens please go to the ER. We will see her tomorrow at 1:30pm as an add-on. She will get an EKG in the morning.

## 2023-01-10 NOTE — TELEPHONE ENCOUNTER
Patient calling complaining of symptoms. After BP taken as Rheumatology about a week ago was high and has been since. Patient then visited a Kidney specialist.  Chest tightness has progressed to more of a chest pain. Patient vomited 01-. She states she did not feel bad before hand and was surprised it came up. Patient feels tired today.   Patient concerned the about the Chest pain and High BP.

## 2023-01-11 ENCOUNTER — OFFICE VISIT (OUTPATIENT)
Dept: FAMILY MEDICINE CLINIC | Age: 53
End: 2023-01-11
Payer: COMMERCIAL

## 2023-01-11 ENCOUNTER — HOSPITAL ENCOUNTER (OUTPATIENT)
Dept: LAB | Age: 53
Discharge: HOME | End: 2023-01-11
Payer: COMMERCIAL

## 2023-01-11 VITALS
RESPIRATION RATE: 14 BRPM | OXYGEN SATURATION: 100 % | TEMPERATURE: 97.5 F | DIASTOLIC BLOOD PRESSURE: 90 MMHG | BODY MASS INDEX: 22.16 KG/M2 | HEART RATE: 80 BPM | SYSTOLIC BLOOD PRESSURE: 168 MMHG | WEIGHT: 133 LBS | HEIGHT: 65 IN

## 2023-01-11 DIAGNOSIS — R03.0 ELEVATED BLOOD PRESSURE READING: ICD-10-CM

## 2023-01-11 DIAGNOSIS — M06.9 RHEUMATOID ARTHRITIS, INVOLVING UNSPECIFIED SITE, UNSPECIFIED WHETHER RHEUMATOID FACTOR PRESENT (HCC): ICD-10-CM

## 2023-01-11 DIAGNOSIS — I10 PRIMARY HYPERTENSION: ICD-10-CM

## 2023-01-11 DIAGNOSIS — R07.89 CHEST PRESSURE: Primary | ICD-10-CM

## 2023-01-11 PROCEDURE — 3080F DIAST BP >= 90 MM HG: CPT | Performed by: FAMILY MEDICINE

## 2023-01-11 PROCEDURE — 99214 OFFICE O/P EST MOD 30 MIN: CPT | Performed by: FAMILY MEDICINE

## 2023-01-11 PROCEDURE — 3077F SYST BP >= 140 MM HG: CPT | Performed by: FAMILY MEDICINE

## 2023-01-11 PROCEDURE — 93005 ELECTROCARDIOGRAM TRACING: CPT

## 2023-01-11 RX ORDER — LOSARTAN POTASSIUM 100 MG/1
100 TABLET ORAL DAILY
Qty: 30 TABLET | Refills: 0 | Status: SHIPPED | OUTPATIENT
Start: 2023-01-11

## 2023-01-11 NOTE — PROGRESS NOTES
1. \"Have you been to the ER, urgent care clinic since your last visit? Hospitalized since your last visit? \" No    2. \"Have you seen or consulted any other health care providers outside of the [address] [place] [address] since your last visit? \" Yes Where: [facility] and Dr. Charmaine Armendariz (nephro)      3. For patients aged 39-70: Has the patient had a colonoscopy / FIT/ Cologuard? Yes - no Care Gap present-due 1/2029      If the patient is female:    4. For patients aged 41-77: Has the patient had a mammogram within the past 2 years? Yes - no Care Gap present      5. For patients aged 21-65: Has the patient had a pap smear?  Yes - no Care Gap present    BP reading using home BP cuff during visit is 170/102

## 2023-01-11 NOTE — PROGRESS NOTES
SUBJECTIVE  Chief Complaint   Patient presents with    Elevated Blood Pressure     She was seen by her rheum and was told she is developing proteinuria. She was referred to nephrology and had elevated BP. She has kept a home BP log and says that she has been having higher blood pressures. She has a kidney ultrasound ordered. She has been repeatedly checking BPs at home. She got nauseated two nights ago after dinner and then vomited a bit. She says she woke up with chest pains described as discomfort similar to her RA chest pains. Says she went to work all day. She had some intermittent chest tightness. No radiation to arm or jaw. Not worse with activity. She says that it is slightly painful to deep breath. It is located substernally. No dyspnea. She is having some anxiety about her BP and worrying about stroke etc.      No family history of heart disease. We reviewed their cardiac risk factors. They have no personal history of Diabetes or HTN. They do not have a significant family history of heart disease but do of stroke. The patient is a nonsmoker. OBJECTIVE    Blood pressure (!) 168/90, pulse 80, temperature 97.5 °F (36.4 °C), temperature source Temporal, resp. rate 14, height 5' 5\" (1.651 m), weight 133 lb (60.3 kg), last menstrual period 06/24/2020, SpO2 100 %. General:  Alert, cooperative, well appearing, in no apparent distress. Neck:  There are no carotid bruits. CV:  The heart sounds are regular in rate and rhythm. There is a normal S1 and S2. There or no murmurs. Lungs: Inspiratory and expiratory efforts are full and unlabored. Lung sounds are clear and equal to auscultation throughout all lung fields without wheezing, rales, or rhonchi. Ext: no swelling.     Results for orders placed or performed during the hospital encounter of 01/11/23   EKG, 12 LEAD, INITIAL   Result Value Ref Range    Ventricular Rate 74 BPM    Atrial Rate 74 BPM    P-R Interval 114 ms QRS Duration 82 ms    Q-T Interval 374 ms    QTC Calculation (Bezet) 415 ms    Calculated P Axis 54 degrees    Calculated R Axis 67 degrees    Calculated T Axis 78 degrees    Diagnosis       Normal sinus rhythm  Possible Left atrial enlargement  Borderline ECG  When compared with ECG of 24-MAY-2013 05:11,  UT interval has increased  Minimal criteria for Inferior infarct are no longer present  Non-specific change in ST segment in Inferior leads  Nonspecific T wave abnormality no longer evident in Inferior leads  Nonspecific T wave abnormality no longer evident in Lateral leads       ASSESSMENT / PLAN      ICD-10-CM ICD-9-CM    1. Chest pressure  R07.89 786.59       2. Primary hypertension  I10 401.9       3. Rheumatoid arthritis, involving unspecified site, unspecified whether rheumatoid factor present ([facility]  M06.9 714.0         Chest pressure - she wants to wait to see cardiology until she gets her BP under control. I do not think this is cardiac related but the safest option is to see cardiology. But again, I highly doubt this is angina. We discussed what classic angina is like with an exertional component. EKG was reassuring. She has had similar pains with her RA in the past and in fact was her initial presenting symptom many years ago. She will monitor closely. HTN - start losartan 100mg daily. Follow-up with nephrology for labs and ultrasound of kidneys in early Feb.  DASH diet handout. RA - could be contributing to proteinuria. She will cont per rheum. All chart history elements were reviewed by me at the time of the visit even though marked at time of note closure. Patient understands our medical plan. Patient has provided input and agrees with goals. Alternatives have been explained and offered. All questions answered. The patient is to call if condition worsens or fails to improve. Follow-up and Dispositions    Return as scheduled.

## 2023-01-12 LAB
ATRIAL RATE: 74 BPM
CALCULATED P AXIS, ECG09: 54 DEGREES
CALCULATED R AXIS, ECG10: 67 DEGREES
CALCULATED T AXIS, ECG11: 78 DEGREES
DIAGNOSIS, 93000: NORMAL
P-R INTERVAL, ECG05: 114 MS
Q-T INTERVAL, ECG07: 374 MS
QRS DURATION, ECG06: 82 MS
QTC CALCULATION (BEZET), ECG08: 415 MS
VENTRICULAR RATE, ECG03: 74 BPM

## 2023-01-30 ENCOUNTER — HOSPITAL ENCOUNTER (OUTPATIENT)
Dept: ULTRASOUND IMAGING | Age: 53
Discharge: HOME | End: 2023-01-30
Attending: INTERNAL MEDICINE
Payer: COMMERCIAL

## 2023-01-30 ENCOUNTER — HOSPITAL ENCOUNTER (OUTPATIENT)
Dept: LAB | Age: 53
Discharge: HOME | End: 2023-01-30

## 2023-01-30 DIAGNOSIS — R80.1 PERSISTENT PROTEINURIA: ICD-10-CM

## 2023-01-30 LAB
XX-LABCORP SPECIMEN COL,LCBCF: NORMAL
XX-LABCORP SPECIMEN COL,LCBCF: NORMAL

## 2023-01-30 PROCEDURE — 99001 SPECIMEN HANDLING PT-LAB: CPT

## 2023-01-30 PROCEDURE — 76770 US EXAM ABDO BACK WALL COMP: CPT

## 2023-02-01 DIAGNOSIS — R80.1 PERSISTENT PROTEINURIA: Primary | ICD-10-CM

## 2023-02-04 DIAGNOSIS — R80.1 PERSISTENT PROTEINURIA: Primary | ICD-10-CM

## 2023-02-10 NOTE — PROGRESS NOTES
1. \"Have you been to the ER, urgent care clinic since your last visit? Hospitalized since your last visit? \" No    2. \"Have you seen or consulted any other health care providers outside of the [address] [place] [address] since your last visit? \" Yes Where: [facility]      3. For patients aged 39-70: Has the patient had a colonoscopy / FIT/ Cologuard? Yes - no Care Gap present-due 1/2029      If the patient is female:    4. For patients aged 41-77: Has the patient had a mammogram within the past 2 years? Yes - no Care Gap present      5. For patients aged 21-65: Has the patient had a pap smear? Yes - no Care Gap present    Any recent (exertional) chest pain? No  SOB? No  Palpitations? No  LE edema? No  Lightheadedness/dizziness?  No

## 2023-02-11 RX ORDER — LOSARTAN POTASSIUM 100 MG/1
100 TABLET ORAL DAILY
COMMUNITY
Start: 2023-01-11 | End: 2023-02-13

## 2023-02-11 RX ORDER — HYDROXYCHLOROQUINE SULFATE 200 MG/1
200 TABLET, FILM COATED ORAL DAILY
COMMUNITY
Start: 2022-05-24

## 2023-02-11 RX ORDER — IBUPROFEN 200 MG
400 TABLET ORAL EVERY 8 HOURS PRN
COMMUNITY

## 2023-02-11 RX ORDER — LOSARTAN POTASSIUM 100 MG/1
TABLET ORAL
Qty: 30 TABLET | Refills: 0 | OUTPATIENT
Start: 2023-02-11

## 2023-02-11 RX ORDER — CHLORAL HYDRATE 500 MG
1000 CAPSULE ORAL DAILY
COMMUNITY
End: 2023-02-13 | Stop reason: CLARIF

## 2023-02-11 RX ORDER — LEVOTHYROXINE SODIUM 0.07 MG/1
75 TABLET ORAL DAILY
COMMUNITY
Start: 2022-10-14 | End: 2023-02-13 | Stop reason: DRUGHIGH

## 2023-02-11 RX ORDER — BUPROPION HYDROCHLORIDE 150 MG/1
150 TABLET ORAL DAILY
COMMUNITY
Start: 2022-12-01

## 2023-02-11 RX ORDER — ACETAMINOPHEN 500 MG
1000 TABLET ORAL EVERY 6 HOURS PRN
COMMUNITY

## 2023-02-13 ENCOUNTER — OFFICE VISIT (OUTPATIENT)
Age: 53
End: 2023-02-13
Payer: COMMERCIAL

## 2023-02-13 VITALS
HEART RATE: 90 BPM | RESPIRATION RATE: 14 BRPM | OXYGEN SATURATION: 99 % | TEMPERATURE: 98.4 F | WEIGHT: 133 LBS | SYSTOLIC BLOOD PRESSURE: 144 MMHG | DIASTOLIC BLOOD PRESSURE: 88 MMHG | HEIGHT: 65 IN | BODY MASS INDEX: 22.16 KG/M2

## 2023-02-13 DIAGNOSIS — M06.9 RHEUMATOID ARTHRITIS, INVOLVING UNSPECIFIED SITE, UNSPECIFIED WHETHER RHEUMATOID FACTOR PRESENT (HCC): ICD-10-CM

## 2023-02-13 DIAGNOSIS — R77.8 ABNORMAL SERUM PROTEIN ELECTROPHORESIS: ICD-10-CM

## 2023-02-13 DIAGNOSIS — N20.0 NEPHROLITHIASIS: ICD-10-CM

## 2023-02-13 DIAGNOSIS — E03.9 ACQUIRED HYPOTHYROIDISM: ICD-10-CM

## 2023-02-13 DIAGNOSIS — F33.0 MILD EPISODE OF RECURRENT MAJOR DEPRESSIVE DISORDER (HCC): ICD-10-CM

## 2023-02-13 DIAGNOSIS — C44.91 BASAL CELL CARCINOMA (BCC), UNSPECIFIED SITE: ICD-10-CM

## 2023-02-13 DIAGNOSIS — I10 ESSENTIAL (PRIMARY) HYPERTENSION: Primary | ICD-10-CM

## 2023-02-13 PROCEDURE — 3079F DIAST BP 80-89 MM HG: CPT | Performed by: FAMILY MEDICINE

## 2023-02-13 PROCEDURE — 99214 OFFICE O/P EST MOD 30 MIN: CPT | Performed by: FAMILY MEDICINE

## 2023-02-13 PROCEDURE — 3077F SYST BP >= 140 MM HG: CPT | Performed by: FAMILY MEDICINE

## 2023-02-13 RX ORDER — LEVOTHYROXINE SODIUM 0.03 MG/1
25 TABLET ORAL DAILY
Qty: 90 TABLET | Refills: 0 | Status: SHIPPED | OUTPATIENT
Start: 2023-02-13

## 2023-02-13 SDOH — ECONOMIC STABILITY: HOUSING INSECURITY
IN THE LAST 12 MONTHS, WAS THERE A TIME WHEN YOU DID NOT HAVE A STEADY PLACE TO SLEEP OR SLEPT IN A SHELTER (INCLUDING NOW)?: NO

## 2023-02-13 SDOH — ECONOMIC STABILITY: FOOD INSECURITY: WITHIN THE PAST 12 MONTHS, YOU WORRIED THAT YOUR FOOD WOULD RUN OUT BEFORE YOU GOT MONEY TO BUY MORE.: NEVER TRUE

## 2023-02-13 SDOH — ECONOMIC STABILITY: FOOD INSECURITY: WITHIN THE PAST 12 MONTHS, THE FOOD YOU BOUGHT JUST DIDN'T LAST AND YOU DIDN'T HAVE MONEY TO GET MORE.: NEVER TRUE

## 2023-02-13 SDOH — ECONOMIC STABILITY: INCOME INSECURITY: HOW HARD IS IT FOR YOU TO PAY FOR THE VERY BASICS LIKE FOOD, HOUSING, MEDICAL CARE, AND HEATING?: NOT HARD AT ALL

## 2023-02-13 ASSESSMENT — PATIENT HEALTH QUESTIONNAIRE - PHQ9
1. LITTLE INTEREST OR PLEASURE IN DOING THINGS: 0
SUM OF ALL RESPONSES TO PHQ QUESTIONS 1-9: 0
2. FEELING DOWN, DEPRESSED OR HOPELESS: 0
SUM OF ALL RESPONSES TO PHQ QUESTIONS 1-9: 0
SUM OF ALL RESPONSES TO PHQ QUESTIONS 1-9: 0
SUM OF ALL RESPONSES TO PHQ9 QUESTIONS 1 & 2: 0
SUM OF ALL RESPONSES TO PHQ QUESTIONS 1-9: 0

## 2023-02-13 NOTE — PROGRESS NOTES
SUBJECTIVE  Chief Complaint   Patient presents with    Results    Hypertension    Thyroid Problem     Patient has since seen nephrology for proteinuria. She has run out of her losartan. She has had mildly elevated home BPs but wants to monitor this for a few more months before adding HCTZ as mentioned as a next step by nephrology. No longer with CP. Taking synthroid as prescribed. Last TSH was even lower. She has stopped 2 of her vitamins for 2 weeks prior to the labs, both containing biotin. Taking Wellbutrin. She says it is not working as well as she would like but higher doses had side effects. OBJECTIVE    Blood pressure (!) 144/88, pulse 90, temperature 98.4 °F (36.9 °C), temperature source Temporal, resp. rate 14, height 5' 5\" (1.651 m), weight 133 lb (60.3 kg), SpO2 99 %. General:  Alert, cooperative, well appearing, in no apparent distress. CV:  The heart sounds are regular in rate and rhythm. There is a normal S1 and S2. There or no murmurs. Lungs: Inspiratory and expiratory efforts are full and unlabored. Ext: no swelling. Psych: anxious affect. Mood good. Oriented x 3. Judgement and insight intact.       Latest Reference Range & Units 1/30/23 00:00   Sodium 134 - 144 mmol/L 138   Potassium 3.5 - 5.2 mmol/L 4.2   Chloride 96 - 106 mmol/L 103   CO2 20 - 29 mmol/L 23   BUN,BUNPL 6 - 24 mg/dL 12   Creatinine 0.57 - 1.00 mg/dL 0.69   Bun/Cre Ratio 9 - 23 NA 17   Glucose, Random 70 - 99 mg/dL 95   CALCIUM, SERUM, 335277 8.7 - 10.2 mg/dL 9.2   Phosphorus 3.0 - 4.3 mg/dL 3.4   Total Protein 6.0 - 8.5 g/dL 7.2   Vitamin B-12 232 - 1245 pg/mL 1004   Est, Glomerular Filtration Rate >59 mL/min/1.73 103   Albumin 3.8 - 4.9 g/dL 4.0   PTH 15 - 65 pg/mL 17   T3,FREE,FT3 2.0 - 4.4 pg/mL 4.3   T4, Total 4.5 - 12.0 ug/dL 12.3 (H)   TSH 0.450 - 4.500 uIU/mL 0.025 (L)   VITAMIN B12  Rpt   Ferritin 15 - 150 ng/mL 231 (H)   Immunoglobulin G, Quantitative 586 - 1,602 mg/dL 2,270 (H) Immunoglobulin M, Quantitative 26 - 217 mg/dL 69   Color, UA Yellow  Yellow   Clarity, UA Clear  Clear   Glucose, UA Negative  Negative   Bilirubin, Urine Negative  Negative   Ketones, Urine Negative  Negative   Specific Gravity, UA 1.005 - 1.030 NA 1.005   Blood, Urine Negative  Negative   pH, UA 5.0 - 7.5 NA 6.5   Protein, UA Negative/Trace  Negative   Urobilinogen, Urine 0.2 - 1.0 mg/dL 0.2   Nitrite, Urine Negative  Negative   Leukocyte Esterase, Urine Negative  Negative   Microscopic Examination  Comment   Creatinine, Ur Not Estab. mg/dL 16.4   Microalbumin Creatinine Ratio 0 - 29 mg/g creat 426 (H)   Microalb, Ur Not Estab. ug/mL 69.9   A/G RATIO, [id number] 0.7 - 1.7 NA 0.9   ALBUMIN, [id number] 2.9 - 4.4 g/dL 3.4   ALPHA-2-GLOBULIN 0.4 - 1.0 g/dL 0.8   BETA GLOBULIN, [id number] 0.7 - 1.3 g/dL 0.8   FREE KAPPA LT CHAINS, SERUM 3.3 - 19.4 mg/L 32.0 (H)   FREE LAMBDA LT CHAINS, SERUM 5.7 - 26.3 mg/L 24.4   GAMMA GLOBULIN, [id number] 0.4 - 1.8 g/dL 1.9 (H)   GLOBULIN, TOTAL 2.2 - 3.9 g/dL 3.8   IMMUNOFIXATION RESULT  Comment ! Immunoglobulin A, Qt. 87 - 352 mg/dL 184   KAPPA/LAMBDA RATIO, SERUM 0.26 - 1.65 NA 1.31   M-SPIKE, [id number] Not Observed g/dL 1.2 (H)   PLEASE NOTE  Comment   PROTEIN TOTAL, URINE Not Estab. mg/dL 12.2   PROTEIN/CREAT RATIO 0 - 200 mg/g creat 744 (H)   (H): Data is abnormally high  (L): Data is abnormally low  !: Data is abnormal  Rpt: View report in Results Review for more information    ASSESSMENT / PLAN     Diagnosis Orders   1. Essential (primary) hypertension        2. Acquired hypothyroidism  TSH      3. Rheumatoid arthritis, involving unspecified site, unspecified whether rheumatoid factor present ([facility]        4. Mild episode of recurrent major depressive disorder ([facility]        5. Basal cell carcinoma (BCC), unspecified site        6. Nephrolithiasis        7. Abnormal serum protein electrophoresis          HTN - restart losartan 100mg daily. Follow-up with nephrology.   She is reluctant at this time to start the HCTZ they had mentioned in their notes as the next steps. She wants to monitor this for now. Hypothyroidism - dec to synthroid 25mcg daily. Check TSH in 6 weeks. Cont to stay off vitamins that has stopped prior to checking labs. RA - could be contributing to proteinuria. She will cont per rheum. Depression - cont wellbutrin. She wants to hold the course for now. Basal cell ca, skin - cont per derm with routine surveillance. Nephrolithiasis - noted incidentally on kidney ultrasound. We will simply watch for now. Abn serum protein - discussed with Dr. Melva Yoder (nephro). He will update his documentation to address this since it was a test he ordered. He seems to think that the abnormalities are largely due to her autoimmune disease. I  tend to agree with him since I have seen abn serum protein patters in individuals with autoimmune disease. I will take his lead on whether he wants to consider a hematology consultation. All chart history elements were reviewed by me at the time of the visit even though marked at time of note closure. Patient understands our medical plan. Patient has provided input and agrees with goals. Alternatives have been explained and offered. All questions answered. The patient is to call if condition worsens or fails to improve. RTC in 7 weeks for follow-up, after having repeat TSH.

## 2023-02-13 NOTE — Clinical Note
HR [facility] [address] [facility]  [address] [facility] [address] [address]  Phone: [phone]  Fax: [phone]    Allie Peters MD, MD        February 13, 2023     Patient: Vadim Rodriguez   YOB: 1970   Date of Visit: 2/13/2023       To Whom It May Concern: It is my medical opinion that [employer] {Work release (duty restriction):95261}. If you have any questions or concerns, please don't hesitate to call.     Sincerely,        Allie Peters MD, MD

## 2023-02-18 ENCOUNTER — HOSPITAL ENCOUNTER (OUTPATIENT)
Facility: HOSPITAL | Age: 53
Setting detail: SPECIMEN
Discharge: HOME | End: 2023-02-21

## 2023-02-18 LAB — LABCORP SPECIMEN COLLECTION: NORMAL

## 2023-02-18 PROCEDURE — 99001 SPECIMEN HANDLING PT-LAB: CPT

## 2023-03-28 ENCOUNTER — HOSPITAL ENCOUNTER (OUTPATIENT)
Facility: HOSPITAL | Age: 53
Discharge: HOME | End: 2023-03-31

## 2023-03-28 LAB — LABCORP SPECIMEN COLLECTION: NORMAL

## 2023-03-28 PROCEDURE — 99001 SPECIMEN HANDLING PT-LAB: CPT

## 2023-03-29 LAB
SPECIMEN STATUS REPORT: NORMAL
TSH SERPL DL<=0.005 MIU/L-ACNC: 2.42 UIU/ML (ref 0.45–4.5)

## 2023-04-03 ENCOUNTER — TELEMEDICINE (OUTPATIENT)
Age: 53
End: 2023-04-03
Payer: COMMERCIAL

## 2023-04-03 DIAGNOSIS — C44.91 BASAL CELL CARCINOMA (BCC), UNSPECIFIED SITE: ICD-10-CM

## 2023-04-03 DIAGNOSIS — I10 ESSENTIAL (PRIMARY) HYPERTENSION: Primary | ICD-10-CM

## 2023-04-03 DIAGNOSIS — E03.9 ACQUIRED HYPOTHYROIDISM: ICD-10-CM

## 2023-04-03 DIAGNOSIS — R77.8 ABNORMAL SERUM PROTEIN ELECTROPHORESIS: ICD-10-CM

## 2023-04-03 DIAGNOSIS — F33.0 MILD EPISODE OF RECURRENT MAJOR DEPRESSIVE DISORDER (HCC): ICD-10-CM

## 2023-04-03 DIAGNOSIS — N20.0 NEPHROLITHIASIS: ICD-10-CM

## 2023-04-03 DIAGNOSIS — M06.9 RHEUMATOID ARTHRITIS, INVOLVING UNSPECIFIED SITE, UNSPECIFIED WHETHER RHEUMATOID FACTOR PRESENT (HCC): ICD-10-CM

## 2023-04-03 PROCEDURE — 99214 OFFICE O/P EST MOD 30 MIN: CPT | Performed by: FAMILY MEDICINE

## 2023-04-03 SDOH — ECONOMIC STABILITY: FOOD INSECURITY: WITHIN THE PAST 12 MONTHS, THE FOOD YOU BOUGHT JUST DIDN'T LAST AND YOU DIDN'T HAVE MONEY TO GET MORE.: NEVER TRUE

## 2023-04-03 SDOH — ECONOMIC STABILITY: INCOME INSECURITY: HOW HARD IS IT FOR YOU TO PAY FOR THE VERY BASICS LIKE FOOD, HOUSING, MEDICAL CARE, AND HEATING?: NOT HARD AT ALL

## 2023-04-03 SDOH — ECONOMIC STABILITY: FOOD INSECURITY: WITHIN THE PAST 12 MONTHS, YOU WORRIED THAT YOUR FOOD WOULD RUN OUT BEFORE YOU GOT MONEY TO BUY MORE.: NEVER TRUE

## 2023-04-03 ASSESSMENT — PATIENT HEALTH QUESTIONNAIRE - PHQ9
3. TROUBLE FALLING OR STAYING ASLEEP: 0
SUM OF ALL RESPONSES TO PHQ QUESTIONS 1-9: 2
SUM OF ALL RESPONSES TO PHQ QUESTIONS 1-9: 0
7. TROUBLE CONCENTRATING ON THINGS, SUCH AS READING THE NEWSPAPER OR WATCHING TELEVISION: 0
SUM OF ALL RESPONSES TO PHQ9 QUESTIONS 1 & 2: 0
SUM OF ALL RESPONSES TO PHQ QUESTIONS 1-9: 2
1. LITTLE INTEREST OR PLEASURE IN DOING THINGS: 0
4. FEELING TIRED OR HAVING LITTLE ENERGY: 2
10. IF YOU CHECKED OFF ANY PROBLEMS, HOW DIFFICULT HAVE THESE PROBLEMS MADE IT FOR YOU TO DO YOUR WORK, TAKE CARE OF THINGS AT HOME, OR GET ALONG WITH OTHER PEOPLE: 0
SUM OF ALL RESPONSES TO PHQ QUESTIONS 1-9: 2
2. FEELING DOWN, DEPRESSED OR HOPELESS: 0
1. LITTLE INTEREST OR PLEASURE IN DOING THINGS: 0
SUM OF ALL RESPONSES TO PHQ QUESTIONS 1-9: 0
2. FEELING DOWN, DEPRESSED OR HOPELESS: 0
SUM OF ALL RESPONSES TO PHQ QUESTIONS 1-9: 0
8. MOVING OR SPEAKING SO SLOWLY THAT OTHER PEOPLE COULD HAVE NOTICED. OR THE OPPOSITE, BEING SO FIGETY OR RESTLESS THAT YOU HAVE BEEN MOVING AROUND A LOT MORE THAN USUAL: 0
6. FEELING BAD ABOUT YOURSELF - OR THAT YOU ARE A FAILURE OR HAVE LET YOURSELF OR YOUR FAMILY DOWN: 0
5. POOR APPETITE OR OVEREATING: 0
9. THOUGHTS THAT YOU WOULD BE BETTER OFF DEAD, OR OF HURTING YOURSELF: 0
SUM OF ALL RESPONSES TO PHQ QUESTIONS 1-9: 0
SUM OF ALL RESPONSES TO PHQ QUESTIONS 1-9: 2
SUM OF ALL RESPONSES TO PHQ9 QUESTIONS 1 & 2: 0

## 2023-04-03 NOTE — PATIENT INSTRUCTIONS
day for 10 or 20 minutes. Moderate exercise is safe for most people, but it is always a good idea to talk to your doctor before starting an exercise program.  Keep moving. Capo Rash the lawn, work in the garden, or Better Bean. Take the stairs instead of the elevator at work. If you smoke, quit. People who smoke have an increased risk for heart attack, stroke, cancer, and other lung illnesses. Quitting is hard, but there are ways to boost your chance of quitting tobacco for good. Use nicotine gum, patches, or lozenges. Ask your doctor about stop-smoking programs and medicines. Keep trying. In addition to reducing your risk of diseases in the future, you will notice some benefits soon after you stop using tobacco. If you have shortness of breath or asthma symptoms, they will likely get better within a few weeks after you quit. Limit how much alcohol you drink. Moderate amounts of alcohol (up to 2 drinks a day for men, 1 drink a day for women) are okay. But drinking too much can lead to liver problems, high blood pressure, and other health problems. Family health  If you have a family, there are many things you can do together to improve your health. Eat meals together as a family as often as possible. Eat healthy foods. This includes fruits, vegetables, lean meats and dairy, and whole grains. Include your family in your fitness plan. Most people think of activities such as jogging or tennis as the way to fitness, but there are many ways you and your family can be more active. Anything that makes you breathe hard and gets your heart pumping is exercise. Here are some tips:  Walk to do errands or to take your child to school or the bus. Go for a family bike ride after dinner instead of watching TV. Where can you learn more? Go to http://shane-fátima.info/. Enter V232 in the search box to learn more about \"A Healthy Lifestyle: Care Instructions. \"  Current as of: September 11,

## 2023-04-03 NOTE — PROGRESS NOTES
Mauro Huston, was evaluated through a synchronous (real-time) audio-video encounter. The patient (or guardian if applicable) is aware that this is a billable service, which includes applicable co-pays. This Virtual Visit was conducted with patient's (and/or legal guardian's) consent. The visit was conducted pursuant to the emergency declaration under the [facility], [facility] authority and the [employer] and [employer] General Act. Patient identification was verified, and a caregiver was present when appropriate. The patient was located at Home: [address] [address]  Provider was located at [facility] ([address] [place], [place] [address]t): [address] [place] [address]  [address] [place] [address] At [place],  [address] [place] [address]    Total time spent for this encounter: Not billed by time    --Darien Gutierrez MD on 4/3/2023 at 8:39 AM    An electronic signature was used to authenticate this note. SUBJECTIVE  Chief Complaint   Patient presents with    Follow-up     TSH    Discuss Labs     Here for follow-up. Has decreased synthroid and her TSH was still low so she has since stopped and has had a recheck. Patient has been seeing nephrology for proteinuria. She has seen oncology. She has had additional blood work. She is having a bone marrow biopsy in a few weeks. Taking Wellbutrin. No complaints at this time. Home Bps have been normotensive on home BP log. OBJECTIVE    General:  Alert, cooperative, well appearing, in no apparent distress. Latest Reference Range & Units 03/28/23 00:00   TSH 0.450 - 4.500 uIU/mL 2.420     ASSESSMENT / PLAN   Diagnosis Orders   1. Essential (primary) hypertension  Lipid Panel      2. Acquired hypothyroidism  TSH      3. Rheumatoid arthritis, involving unspecified site, unspecified whether rheumatoid factor present ([facility]        4. Mild episode of recurrent major depressive disorder ([facility]        5.  Basal cell carcinoma

## 2023-07-10 RX ORDER — BUPROPION HYDROCHLORIDE 150 MG/1
TABLET ORAL
Qty: 90 TABLET | Refills: 0 | Status: SHIPPED | OUTPATIENT
Start: 2023-07-10

## 2023-07-10 NOTE — TELEPHONE ENCOUNTER
Requested Prescriptions     Pending Prescriptions Disp Refills    buPROPion (WELLBUTRIN XL) 150 MG extended release tablet [Pharmacy Med Name: BUPROPION HCL  MG TABLET] 90 tablet      Sig: take 1 tablet by mouth once daily     Last OV: 4/3/2023  Last labs: 3/28/2023  Next OV and labs: due 10/2023    LMOV [phone] for patient to contact [facility] to schedule October follow up appointment.

## 2023-07-13 RX ORDER — BUPROPION HYDROCHLORIDE 150 MG/1
TABLET ORAL
Qty: 90 TABLET | Refills: 0 | OUTPATIENT
Start: 2023-07-13

## 2023-08-23 RX ORDER — LOSARTAN POTASSIUM 100 MG/1
TABLET ORAL
Qty: 90 TABLET | Refills: 1 | Status: SHIPPED | OUTPATIENT
Start: 2023-08-23

## 2023-08-23 NOTE — TELEPHONE ENCOUNTER
Requested Prescriptions     Pending Prescriptions Disp Refills    losartan (COZAAR) 100 MG tablet [Pharmacy Med Name: LOSARTAN POTASSIUM 100 MG TAB] 90 tablet 1     Sig: take 1 tablet by mouth once daily     Last OV: 4/3/2023  Last labs: 4/3/2023  Next OV: 9/20/2023

## 2023-08-26 ENCOUNTER — HOSPITAL ENCOUNTER (OUTPATIENT)
Facility: HOSPITAL | Age: 53
Discharge: HOME | End: 2023-08-29
Payer: COMMERCIAL

## 2023-08-26 DIAGNOSIS — E03.9 ACQUIRED HYPOTHYROIDISM: ICD-10-CM

## 2023-08-26 DIAGNOSIS — I10 ESSENTIAL (PRIMARY) HYPERTENSION: ICD-10-CM

## 2023-08-26 LAB
ALBUMIN SERPL-MCNC: 3.1 G/DL (ref 3.4–5)
ANION GAP SERPL CALC-SCNC: 5 MMOL/L (ref 3–18)
BUN SERPL-MCNC: 15 MG/DL (ref 7–18)
BUN/CREAT SERPL: 18 (ref 12–20)
CALCIUM SERPL-MCNC: 8.7 MG/DL (ref 8.5–10.1)
CHLORIDE SERPL-SCNC: 109 MMOL/L (ref 100–111)
CHOLEST SERPL-MCNC: 146 MG/DL
CO2 SERPL-SCNC: 26 MMOL/L (ref 21–32)
CREAT SERPL-MCNC: 0.85 MG/DL (ref 0.6–1.3)
CREAT UR-MCNC: 126 MG/DL (ref 30–125)
GLUCOSE SERPL-MCNC: 86 MG/DL (ref 74–99)
HDLC SERPL-MCNC: 51 MG/DL (ref 40–60)
HDLC SERPL: 2.9 (ref 0–5)
LDLC SERPL CALC-MCNC: 70.2 MG/DL (ref 0–100)
LIPID PANEL: NORMAL
PHOSPHATE SERPL-MCNC: 3.3 MG/DL (ref 2.5–4.9)
POTASSIUM SERPL-SCNC: 4.3 MMOL/L (ref 3.5–5.5)
PROT UR-MCNC: 86 MG/DL
PROT/CREAT UR-RTO: 0.7
SODIUM SERPL-SCNC: 140 MMOL/L (ref 136–145)
TRIGL SERPL-MCNC: 124 MG/DL
TSH SERPL DL<=0.05 MIU/L-ACNC: 2.41 UIU/ML (ref 0.36–3.74)
VLDLC SERPL CALC-MCNC: 24.8 MG/DL

## 2023-08-26 PROCEDURE — 80069 RENAL FUNCTION PANEL: CPT

## 2023-08-26 PROCEDURE — 84156 ASSAY OF PROTEIN URINE: CPT

## 2023-08-26 PROCEDURE — 80061 LIPID PANEL: CPT

## 2023-08-26 PROCEDURE — 83521 IG LIGHT CHAINS FREE EACH: CPT

## 2023-08-26 PROCEDURE — 84443 ASSAY THYROID STIM HORMONE: CPT

## 2023-08-26 PROCEDURE — 82570 ASSAY OF URINE CREATININE: CPT

## 2023-08-26 PROCEDURE — 86335 IMMUNFIX E-PHORSIS/URINE/CSF: CPT

## 2023-08-26 PROCEDURE — 36415 COLL VENOUS BLD VENIPUNCTURE: CPT

## 2023-08-30 LAB
COLLECT DURATION TIME UR: NORMAL HR
INTERPRETATION UR IFE-IMP: ABNORMAL
KAPPA LC FREE UR-MCNC: 79.6 MG/L (ref 1.17–86.46)
KAPPA LC FREE/LAMBDA FREE UR: 5.83 (ref 1.83–14.26)
LAMBDA LC FREE UR-MCNC: 13.66 MG/L (ref 0.27–15.21)
SPECIMEN VOL ?TM UR: NORMAL ML

## 2023-09-20 ENCOUNTER — OFFICE VISIT (OUTPATIENT)
Age: 53
End: 2023-09-20
Payer: COMMERCIAL

## 2023-09-20 VITALS
HEART RATE: 71 BPM | WEIGHT: 137 LBS | DIASTOLIC BLOOD PRESSURE: 70 MMHG | TEMPERATURE: 98.2 F | SYSTOLIC BLOOD PRESSURE: 136 MMHG | OXYGEN SATURATION: 98 % | RESPIRATION RATE: 16 BRPM | BODY MASS INDEX: 22.82 KG/M2 | HEIGHT: 65 IN

## 2023-09-20 DIAGNOSIS — Z23 ENCOUNTER FOR IMMUNIZATION: ICD-10-CM

## 2023-09-20 DIAGNOSIS — F33.0 MILD EPISODE OF RECURRENT MAJOR DEPRESSIVE DISORDER (HCC): ICD-10-CM

## 2023-09-20 DIAGNOSIS — C44.91 BASAL CELL CARCINOMA (BCC), UNSPECIFIED SITE: ICD-10-CM

## 2023-09-20 DIAGNOSIS — M06.9 RHEUMATOID ARTHRITIS, INVOLVING UNSPECIFIED SITE, UNSPECIFIED WHETHER RHEUMATOID FACTOR PRESENT (HCC): ICD-10-CM

## 2023-09-20 DIAGNOSIS — N20.0 NEPHROLITHIASIS: ICD-10-CM

## 2023-09-20 DIAGNOSIS — I10 ESSENTIAL (PRIMARY) HYPERTENSION: Primary | ICD-10-CM

## 2023-09-20 DIAGNOSIS — E03.9 ACQUIRED HYPOTHYROIDISM: ICD-10-CM

## 2023-09-20 DIAGNOSIS — D47.2 MGUS (MONOCLONAL GAMMOPATHY OF UNKNOWN SIGNIFICANCE): ICD-10-CM

## 2023-09-20 PROBLEM — G57.62 MORTON'S NEUROMA OF LEFT FOOT: Status: ACTIVE | Noted: 2023-07-19

## 2023-09-20 PROBLEM — R80.9 PROTEINURIA: Status: ACTIVE | Noted: 2023-09-20

## 2023-09-20 PROCEDURE — 3078F DIAST BP <80 MM HG: CPT | Performed by: FAMILY MEDICINE

## 2023-09-20 PROCEDURE — 1036F TOBACCO NON-USER: CPT | Performed by: FAMILY MEDICINE

## 2023-09-20 PROCEDURE — 3017F COLORECTAL CA SCREEN DOC REV: CPT | Performed by: FAMILY MEDICINE

## 2023-09-20 PROCEDURE — G8420 CALC BMI NORM PARAMETERS: HCPCS | Performed by: FAMILY MEDICINE

## 2023-09-20 PROCEDURE — 90674 CCIIV4 VAC NO PRSV 0.5 ML IM: CPT | Performed by: FAMILY MEDICINE

## 2023-09-20 PROCEDURE — 90471 IMMUNIZATION ADMIN: CPT | Performed by: FAMILY MEDICINE

## 2023-09-20 PROCEDURE — G8427 DOCREV CUR MEDS BY ELIG CLIN: HCPCS | Performed by: FAMILY MEDICINE

## 2023-09-20 PROCEDURE — 99214 OFFICE O/P EST MOD 30 MIN: CPT | Performed by: FAMILY MEDICINE

## 2023-09-20 PROCEDURE — 3074F SYST BP LT 130 MM HG: CPT | Performed by: FAMILY MEDICINE

## 2023-09-20 RX ORDER — CLOBETASOL PROPIONATE 0.5 MG/G
CREAM TOPICAL
COMMUNITY

## 2023-09-20 RX ORDER — IMIQUIMOD 12.5 MG/.25G
CREAM TOPICAL
COMMUNITY

## 2023-09-20 NOTE — PROGRESS NOTES
Chief Complaint   Patient presents with    Hypertension    Hypothyroidism    Arthritis     Hx of rheumatoid arthritis     Depression      1. \"Have you been to the ER, urgent care clinic since your last visit? Hospitalized since your last visit? \" No    2. \"Have you seen or consulted any other health care providers outside of the [address] [place] [address] since your last visit? \" Yes 09/08/2023 Dr. Mary Childress nephrology for CKD      3. For patients aged 43-73: Has the patient had a colonoscopy / FIT/ Cologuard? Yes - no Care Gap present due 01/24/2029      If the patient is female:    4. For patients aged 43-66: Has the patient had a mammogram within the past 2 years? Yes - no Care Gap present due 10/31/2023      5. For patients aged 21-65: Has the patient had a pap smear? Yes - no Care Gap present due 06/11/2024    Physician order obtained. Patient completed adult immunization consent form. Allergies, contraindications and recommendations reviewed with patient. Seasonal influenza vaccine administered IM left deltoid. Patient tolerated well. Patient remained in office for 15 minutes after injection and no adverse reactions were noted.      Lot # [id number]  Exp Date: 06/30/2024  [address] [place] [address] # [id number]

## 2023-10-09 RX ORDER — BUPROPION HYDROCHLORIDE 150 MG/1
TABLET ORAL
Qty: 90 TABLET | Refills: 1 | Status: SHIPPED | OUTPATIENT
Start: 2023-10-09

## 2023-10-16 RX ORDER — BUPROPION HYDROCHLORIDE 150 MG/1
TABLET ORAL
Qty: 90 TABLET | Refills: 1 | OUTPATIENT
Start: 2023-10-16

## 2023-10-18 ENCOUNTER — HOSPITAL ENCOUNTER (OUTPATIENT)
Facility: HOSPITAL | Age: 53
Discharge: HOME | End: 2023-10-21
Payer: COMMERCIAL

## 2023-10-18 LAB
ALBUMIN SERPL-MCNC: 3.5 G/DL (ref 3.4–5)
ALBUMIN/GLOB SERPL: 0.8 (ref 0.8–1.7)
ALP SERPL-CCNC: 126 U/L (ref 45–117)
ALT SERPL-CCNC: 46 U/L (ref 13–56)
ANION GAP SERPL CALC-SCNC: 5 MMOL/L (ref 3–18)
AST SERPL-CCNC: 24 U/L (ref 10–38)
BASOPHILS # BLD: 0 K/UL (ref 0–0.1)
BASOPHILS NFR BLD: 0 % (ref 0–2)
BILIRUB SERPL-MCNC: 0.2 MG/DL (ref 0.2–1)
BUN SERPL-MCNC: 20 MG/DL (ref 7–18)
BUN/CREAT SERPL: 19 (ref 12–20)
CALCIUM SERPL-MCNC: 9 MG/DL (ref 8.5–10.1)
CHLORIDE SERPL-SCNC: 105 MMOL/L (ref 100–111)
CO2 SERPL-SCNC: 28 MMOL/L (ref 21–32)
CREAT SERPL-MCNC: 1.03 MG/DL (ref 0.6–1.3)
CREAT UR-MCNC: 83 MG/DL (ref 30–125)
DIFFERENTIAL METHOD BLD: ABNORMAL
EOSINOPHIL # BLD: 0.1 K/UL (ref 0–0.4)
EOSINOPHIL NFR BLD: 2 % (ref 0–5)
ERYTHROCYTE [DISTWIDTH] IN BLOOD BY AUTOMATED COUNT: 13 % (ref 11.6–14.5)
ERYTHROCYTE [SEDIMENTATION RATE] IN BLOOD: 39 MM/HR (ref 0–30)
FERRITIN SERPL-MCNC: 208 NG/ML (ref 8–388)
GLOBULIN SER CALC-MCNC: 4.2 G/DL (ref 2–4)
GLUCOSE SERPL-MCNC: 83 MG/DL (ref 74–99)
HCT VFR BLD AUTO: 34.5 % (ref 35–45)
HGB BLD-MCNC: 11.1 G/DL (ref 12–16)
IMM GRANULOCYTES # BLD AUTO: 0 K/UL (ref 0–0.04)
IMM GRANULOCYTES NFR BLD AUTO: 0 % (ref 0–0.5)
IRON SATN MFR SERPL: 14 % (ref 20–50)
IRON SERPL-MCNC: 37 UG/DL (ref 50–175)
LDH SERPL L TO P-CCNC: 177 U/L (ref 81–234)
LYMPHOCYTES # BLD: 0.8 K/UL (ref 0.9–3.6)
LYMPHOCYTES NFR BLD: 22 % (ref 21–52)
MCH RBC QN AUTO: 29.2 PG (ref 24–34)
MCHC RBC AUTO-ENTMCNC: 32.2 G/DL (ref 31–37)
MCV RBC AUTO: 90.8 FL (ref 78–100)
MONOCYTES # BLD: 0.4 K/UL (ref 0.05–1.2)
MONOCYTES NFR BLD: 10 % (ref 3–10)
NEUTS SEG # BLD: 2.4 K/UL (ref 1.8–8)
NEUTS SEG NFR BLD: 66 % (ref 40–73)
NRBC # BLD: 0 K/UL (ref 0–0.01)
NRBC BLD-RTO: 0 PER 100 WBC
PLATELET # BLD AUTO: 254 K/UL (ref 135–420)
PMV BLD AUTO: 8.8 FL (ref 9.2–11.8)
POTASSIUM SERPL-SCNC: 4.7 MMOL/L (ref 3.5–5.5)
PROT SERPL-MCNC: 7.7 G/DL (ref 6.4–8.2)
PROT UR-MCNC: 62 MG/DL
PROT/CREAT UR-RTO: 0.7
RBC # BLD AUTO: 3.8 M/UL (ref 4.2–5.3)
SODIUM SERPL-SCNC: 138 MMOL/L (ref 136–145)
TIBC SERPL-MCNC: 270 UG/DL (ref 250–450)
WBC # BLD AUTO: 3.6 K/UL (ref 4.6–13.2)

## 2023-10-18 PROCEDURE — 82570 ASSAY OF URINE CREATININE: CPT

## 2023-10-18 PROCEDURE — 83521 IG LIGHT CHAINS FREE EACH: CPT

## 2023-10-18 PROCEDURE — 83540 ASSAY OF IRON: CPT

## 2023-10-18 PROCEDURE — 80053 COMPREHEN METABOLIC PANEL: CPT

## 2023-10-18 PROCEDURE — 84156 ASSAY OF PROTEIN URINE: CPT

## 2023-10-18 PROCEDURE — 84166 PROTEIN E-PHORESIS/URINE/CSF: CPT

## 2023-10-18 PROCEDURE — 84165 PROTEIN E-PHORESIS SERUM: CPT

## 2023-10-18 PROCEDURE — 82728 ASSAY OF FERRITIN: CPT

## 2023-10-18 PROCEDURE — 85652 RBC SED RATE AUTOMATED: CPT

## 2023-10-18 PROCEDURE — 82232 ASSAY OF BETA-2 PROTEIN: CPT

## 2023-10-18 PROCEDURE — 86334 IMMUNOFIX E-PHORESIS SERUM: CPT

## 2023-10-18 PROCEDURE — 82784 ASSAY IGA/IGD/IGG/IGM EACH: CPT

## 2023-10-18 PROCEDURE — 83615 LACTATE (LD) (LDH) ENZYME: CPT

## 2023-10-18 PROCEDURE — 83550 IRON BINDING TEST: CPT

## 2023-10-18 PROCEDURE — 85025 COMPLETE CBC W/AUTO DIFF WBC: CPT

## 2023-10-18 PROCEDURE — 36415 COLL VENOUS BLD VENIPUNCTURE: CPT

## 2023-10-20 LAB
B2 MICROGLOB SERPL-MCNC: 4.1 MG/L (ref 0.6–2.4)
KAPPA LC FREE SER-MCNC: 46.1 MG/L (ref 3.3–19.4)
KAPPA LC FREE/LAMBDA FREE SER: 1.28 (ref 0.26–1.65)
LAMBDA LC FREE SERPL-MCNC: 36.1 MG/L (ref 5.7–26.3)

## 2023-10-21 LAB
COLLECT DURATION TIME UR: NORMAL HR
FAKE RESOE TEST: NORMAL
KAPPA LC FREE UR-MCNC: 28.82 MG/L (ref 1.17–86.46)
KAPPA LC FREE/LAMBDA FREE UR: 3.28 (ref 1.83–14.26)
LAMBDA LC FREE UR-MCNC: 8.8 MG/L (ref 0.27–15.21)
SPECIMEN VOL ?TM UR: NORMAL ML

## 2023-10-24 LAB
ALBUMIN MFR UR ELPH: 79.8 %
ALBUMIN SERPL ELPH-MCNC: 3.4 G/DL (ref 2.9–4.4)
ALBUMIN/GLOB SERPL: 0.9 (ref 0.7–1.7)
ALPHA1 GLOB MFR UR ELPH: 1.8 %
ALPHA1 GLOB SERPL ELPH-MCNC: 0.3 G/DL (ref 0–0.4)
ALPHA2 GLOB 24H MFR UR ELPH: 3.5 %
ALPHA2 GLOB SERPL ELPH-MCNC: 0.8 G/DL (ref 0.4–1)
B-GLOBULIN 24H MFR UR ELPH: 7.5 %
B-GLOBULIN SERPL ELPH-MCNC: 0.9 G/DL (ref 0.7–1.3)
GAMMA GLOB 24H MFR UR ELPH: 7.4 %
GAMMA GLOB SERPL ELPH-MCNC: 2 G/DL (ref 0.4–1.8)
GLOBULIN SER-MCNC: 3.9 G/DL (ref 2.2–3.9)
IGA SERPL-MCNC: 186 MG/DL (ref 87–352)
IGG SERPL-MCNC: 2074 MG/DL (ref 586–1602)
IGM SERPL-MCNC: 68 MG/DL (ref 26–217)
INTERPRETATION SERPL IEP-IMP: ABNORMAL
LABORATORY COMMENT REPORT: ABNORMAL
M PROTEIN MFR UR ELPH: 3.3 %
M PROTEIN SERPL ELPH-MCNC: 1.5 G/DL
PROT SERPL-MCNC: 7.3 G/DL (ref 6–8.5)
PROT UR-MCNC: 54.1 MG/DL

## 2023-10-29 LAB
COLLECT DURATION TIME UR: NORMAL HR
KAPPA LC FREE UR-MCNC: 28.82 MG/L (ref 1.17–86.46)
KAPPA LC FREE/LAMBDA FREE UR: 3.28 (ref 1.83–14.26)
LAMBDA LC FREE UR-MCNC: 8.8 MG/L (ref 0.27–15.21)
SPECIMEN VOL ?TM UR: NORMAL ML

## 2023-11-13 ENCOUNTER — PATIENT MESSAGE (OUTPATIENT)
Age: 53
End: 2023-11-13

## 2023-11-16 RX ORDER — METHYLPREDNISOLONE 4 MG/1
TABLET ORAL
Qty: 1 KIT | Refills: 0 | Status: SHIPPED | OUTPATIENT
Start: 2023-11-16 | End: 2023-11-22

## 2023-11-16 RX ORDER — TRAZODONE HYDROCHLORIDE 100 MG/1
50-100 TABLET ORAL NIGHTLY
Qty: 30 TABLET | Refills: 0 | Status: SHIPPED | OUTPATIENT
Start: 2023-11-16

## 2023-11-17 ENCOUNTER — HOSPITAL ENCOUNTER (OUTPATIENT)
Facility: HOSPITAL | Age: 53
Discharge: HOME | End: 2023-11-17
Payer: COMMERCIAL

## 2023-11-17 LAB
ALBUMIN SERPL-MCNC: 3.3 G/DL (ref 3.4–5)
ANION GAP SERPL CALC-SCNC: 1 MMOL/L (ref 3–18)
BUN SERPL-MCNC: 14 MG/DL (ref 7–18)
BUN/CREAT SERPL: 15 (ref 12–20)
CALCIUM SERPL-MCNC: 9.1 MG/DL (ref 8.5–10.1)
CHLORIDE SERPL-SCNC: 108 MMOL/L (ref 100–111)
CO2 SERPL-SCNC: 29 MMOL/L (ref 21–32)
CREAT SERPL-MCNC: 0.91 MG/DL (ref 0.6–1.3)
CREAT UR-MCNC: 35 MG/DL (ref 30–125)
CREAT UR-MCNC: 35 MG/DL (ref 30–125)
ERYTHROCYTE [DISTWIDTH] IN BLOOD BY AUTOMATED COUNT: 13.1 % (ref 11.6–14.5)
GLUCOSE SERPL-MCNC: 72 MG/DL (ref 74–99)
HCT VFR BLD AUTO: 33.6 % (ref 35–45)
HGB BLD-MCNC: 10.7 G/DL (ref 12–16)
MCH RBC QN AUTO: 29.2 PG (ref 24–34)
MCHC RBC AUTO-ENTMCNC: 31.8 G/DL (ref 31–37)
MCV RBC AUTO: 91.8 FL (ref 78–100)
MICROALBUMIN UR-MCNC: 31.4 MG/DL (ref 0–3)
MICROALBUMIN/CREAT UR-RTO: 897 MG/G (ref 0–30)
NRBC # BLD: 0 K/UL (ref 0–0.01)
NRBC BLD-RTO: 0 PER 100 WBC
PHOSPHATE SERPL-MCNC: 3.6 MG/DL (ref 2.5–4.9)
PLATELET # BLD AUTO: 242 K/UL (ref 135–420)
PMV BLD AUTO: 8.4 FL (ref 9.2–11.8)
POTASSIUM SERPL-SCNC: 4.6 MMOL/L (ref 3.5–5.5)
PROT UR-MCNC: 55 MG/DL
PROT/CREAT UR-RTO: 1.6
RBC # BLD AUTO: 3.66 M/UL (ref 4.2–5.3)
SODIUM SERPL-SCNC: 138 MMOL/L (ref 136–145)
WBC # BLD AUTO: 3.5 K/UL (ref 4.6–13.2)

## 2023-11-17 PROCEDURE — 84156 ASSAY OF PROTEIN URINE: CPT

## 2023-11-17 PROCEDURE — 85027 COMPLETE CBC AUTOMATED: CPT

## 2023-11-17 PROCEDURE — 82043 UR ALBUMIN QUANTITATIVE: CPT

## 2023-11-17 PROCEDURE — 80069 RENAL FUNCTION PANEL: CPT

## 2023-11-17 PROCEDURE — 36415 COLL VENOUS BLD VENIPUNCTURE: CPT

## 2023-11-17 PROCEDURE — 82570 ASSAY OF URINE CREATININE: CPT

## 2023-12-12 ENCOUNTER — HOSPITAL ENCOUNTER (OUTPATIENT)
Facility: HOSPITAL | Age: 53
Setting detail: OBSERVATION
Discharge: HOME | End: 2023-12-13
Attending: INTERNAL MEDICINE | Admitting: INTERNAL MEDICINE
Payer: COMMERCIAL

## 2023-12-12 DIAGNOSIS — N18.9 CHRONIC KIDNEY DISEASE, UNSPECIFIED CKD STAGE: ICD-10-CM

## 2023-12-12 DIAGNOSIS — N18.6 ESRD (END STAGE RENAL DISEASE) (HCC): ICD-10-CM

## 2023-12-12 DIAGNOSIS — R80.8 OTHER PROTEINURIA: ICD-10-CM

## 2023-12-12 PROBLEM — G89.18 POST PROCEDURE DISCOMFORT: Status: ACTIVE | Noted: 2023-12-12

## 2023-12-12 LAB
ANION GAP SERPL CALC-SCNC: 1 MMOL/L (ref 3–18)
APTT PPP: 25.6 SEC (ref 23–36.4)
BASOPHILS # BLD: 0 K/UL (ref 0–0.1)
BASOPHILS NFR BLD: 0 % (ref 0–2)
BUN SERPL-MCNC: 13 MG/DL (ref 7–18)
BUN/CREAT SERPL: 14 (ref 12–20)
CALCIUM SERPL-MCNC: 8.7 MG/DL (ref 8.5–10.1)
CHLORIDE SERPL-SCNC: 112 MMOL/L (ref 100–111)
CO2 SERPL-SCNC: 27 MMOL/L (ref 21–32)
CREAT SERPL-MCNC: 0.9 MG/DL (ref 0.6–1.3)
DIFFERENTIAL METHOD BLD: ABNORMAL
EOSINOPHIL # BLD: 0.1 K/UL (ref 0–0.4)
EOSINOPHIL NFR BLD: 2 % (ref 0–5)
ERYTHROCYTE [DISTWIDTH] IN BLOOD BY AUTOMATED COUNT: 13.4 % (ref 11.6–14.5)
GLUCOSE SERPL-MCNC: 87 MG/DL (ref 74–99)
HCG UR QL: NEGATIVE
HCT VFR BLD AUTO: 31.2 % (ref 35–45)
HCT VFR BLD AUTO: 31.6 % (ref 35–45)
HCT VFR BLD AUTO: 33.1 % (ref 35–45)
HGB BLD-MCNC: 10 G/DL (ref 12–16)
HGB BLD-MCNC: 10.6 G/DL (ref 12–16)
HGB BLD-MCNC: 10.9 G/DL (ref 12–16)
IMM GRANULOCYTES # BLD AUTO: 0 K/UL (ref 0–0.04)
IMM GRANULOCYTES NFR BLD AUTO: 0 % (ref 0–0.5)
INR PPP: 0.9 (ref 0.9–1.1)
LYMPHOCYTES # BLD: 0.7 K/UL (ref 0.9–3.6)
LYMPHOCYTES NFR BLD: 22 % (ref 21–52)
MCH RBC QN AUTO: 29.4 PG (ref 24–34)
MCHC RBC AUTO-ENTMCNC: 32.9 G/DL (ref 31–37)
MCV RBC AUTO: 89.2 FL (ref 78–100)
MONOCYTES # BLD: 0.2 K/UL (ref 0.05–1.2)
MONOCYTES NFR BLD: 7 % (ref 3–10)
NEUTS SEG # BLD: 2.2 K/UL (ref 1.8–8)
NEUTS SEG NFR BLD: 69 % (ref 40–73)
NRBC # BLD: 0 K/UL (ref 0–0.01)
NRBC BLD-RTO: 0 PER 100 WBC
PLATELET # BLD AUTO: 229 K/UL (ref 135–420)
PMV BLD AUTO: 8.8 FL (ref 9.2–11.8)
POTASSIUM SERPL-SCNC: 4.5 MMOL/L (ref 3.5–5.5)
PROTHROMBIN TIME: 12 SEC (ref 11.9–14.7)
RBC # BLD AUTO: 3.71 M/UL (ref 4.2–5.3)
SODIUM SERPL-SCNC: 140 MMOL/L (ref 136–145)
WBC # BLD AUTO: 3.2 K/UL (ref 4.6–13.2)

## 2023-12-12 PROCEDURE — 7100000010 HC PHASE II RECOVERY - FIRST 15 MIN

## 2023-12-12 PROCEDURE — 85025 COMPLETE CBC W/AUTO DIFF WBC: CPT

## 2023-12-12 PROCEDURE — G0378 HOSPITAL OBSERVATION PER HR: HCPCS

## 2023-12-12 PROCEDURE — 2580000003 HC RX 258: Performed by: INTERNAL MEDICINE

## 2023-12-12 PROCEDURE — 88348 ELECTRON MICROSCOPY DX: CPT

## 2023-12-12 PROCEDURE — 99222 1ST HOSP IP/OBS MODERATE 55: CPT | Performed by: INTERNAL MEDICINE

## 2023-12-12 PROCEDURE — 80048 BASIC METABOLIC PNL TOTAL CA: CPT

## 2023-12-12 PROCEDURE — 2580000003 HC RX 258: Performed by: RADIOLOGY

## 2023-12-12 PROCEDURE — 6370000000 HC RX 637 (ALT 250 FOR IP): Performed by: INTERNAL MEDICINE

## 2023-12-12 PROCEDURE — 85018 HEMOGLOBIN: CPT

## 2023-12-12 PROCEDURE — 85014 HEMATOCRIT: CPT

## 2023-12-12 PROCEDURE — 7100000011 HC PHASE II RECOVERY - ADDTL 15 MIN

## 2023-12-12 PROCEDURE — 99152 MOD SED SAME PHYS/QHP 5/>YRS: CPT

## 2023-12-12 PROCEDURE — 81025 URINE PREGNANCY TEST: CPT

## 2023-12-12 PROCEDURE — 88305 TISSUE EXAM BY PATHOLOGIST: CPT

## 2023-12-12 PROCEDURE — 88350 IMFLUOR EA ADDL 1ANTB STN PX: CPT

## 2023-12-12 PROCEDURE — 36415 COLL VENOUS BLD VENIPUNCTURE: CPT

## 2023-12-12 PROCEDURE — 85610 PROTHROMBIN TIME: CPT

## 2023-12-12 PROCEDURE — 6360000002 HC RX W HCPCS: Performed by: RADIOLOGY

## 2023-12-12 PROCEDURE — 85730 THROMBOPLASTIN TIME PARTIAL: CPT

## 2023-12-12 PROCEDURE — 88313 SPECIAL STAINS GROUP 2: CPT

## 2023-12-12 PROCEDURE — 88346 IMFLUOR 1ST 1ANTB STAIN PX: CPT

## 2023-12-12 RX ORDER — MORPHINE SULFATE 2 MG/ML
2 INJECTION, SOLUTION INTRAMUSCULAR; INTRAVENOUS
Status: DISCONTINUED | OUTPATIENT
Start: 2023-12-12 | End: 2023-12-13 | Stop reason: HOSPADM

## 2023-12-12 RX ORDER — ACETAMINOPHEN 650 MG/1
650 SUPPOSITORY RECTAL EVERY 6 HOURS PRN
Status: DISCONTINUED | OUTPATIENT
Start: 2023-12-12 | End: 2023-12-13 | Stop reason: HOSPADM

## 2023-12-12 RX ORDER — POLYETHYLENE GLYCOL 3350 17 G/17G
17 POWDER, FOR SOLUTION ORAL DAILY PRN
Status: DISCONTINUED | OUTPATIENT
Start: 2023-12-12 | End: 2023-12-13 | Stop reason: HOSPADM

## 2023-12-12 RX ORDER — TRAZODONE HYDROCHLORIDE 50 MG/1
50 TABLET ORAL NIGHTLY
Status: DISCONTINUED | OUTPATIENT
Start: 2023-12-12 | End: 2023-12-13 | Stop reason: HOSPADM

## 2023-12-12 RX ORDER — HYDROXYCHLOROQUINE SULFATE 200 MG/1
200 TABLET, FILM COATED ORAL DAILY
Status: DISCONTINUED | OUTPATIENT
Start: 2023-12-13 | End: 2023-12-13 | Stop reason: HOSPADM

## 2023-12-12 RX ORDER — SODIUM CHLORIDE 9 MG/ML
INJECTION, SOLUTION INTRAVENOUS CONTINUOUS
Status: DISCONTINUED | OUTPATIENT
Start: 2023-12-12 | End: 2023-12-13 | Stop reason: HOSPADM

## 2023-12-12 RX ORDER — FENTANYL CITRATE 50 UG/ML
INJECTION, SOLUTION INTRAMUSCULAR; INTRAVENOUS
Status: DISCONTINUED
Start: 2023-12-12 | End: 2023-12-12 | Stop reason: WASHOUT

## 2023-12-12 RX ORDER — ACETAMINOPHEN 500 MG
500 TABLET ORAL EVERY 4 HOURS PRN
Status: DISCONTINUED | OUTPATIENT
Start: 2023-12-12 | End: 2023-12-13 | Stop reason: HOSPADM

## 2023-12-12 RX ORDER — ONDANSETRON 4 MG/1
4 TABLET, ORALLY DISINTEGRATING ORAL EVERY 8 HOURS PRN
Status: DISCONTINUED | OUTPATIENT
Start: 2023-12-12 | End: 2023-12-13 | Stop reason: HOSPADM

## 2023-12-12 RX ORDER — ONDANSETRON 2 MG/ML
4 INJECTION INTRAMUSCULAR; INTRAVENOUS EVERY 6 HOURS PRN
Status: DISCONTINUED | OUTPATIENT
Start: 2023-12-12 | End: 2023-12-13 | Stop reason: HOSPADM

## 2023-12-12 RX ORDER — MIDAZOLAM HYDROCHLORIDE 1 MG/ML
INJECTION INTRAMUSCULAR; INTRAVENOUS
Status: DISCONTINUED
Start: 2023-12-12 | End: 2023-12-12 | Stop reason: WASHOUT

## 2023-12-12 RX ORDER — MIDAZOLAM HYDROCHLORIDE 2 MG/2ML
INJECTION, SOLUTION INTRAMUSCULAR; INTRAVENOUS PRN
Status: COMPLETED | OUTPATIENT
Start: 2023-12-12 | End: 2023-12-12

## 2023-12-12 RX ORDER — FENTANYL CITRATE 50 UG/ML
INJECTION, SOLUTION INTRAMUSCULAR; INTRAVENOUS PRN
Status: COMPLETED | OUTPATIENT
Start: 2023-12-12 | End: 2023-12-12

## 2023-12-12 RX ORDER — OXYCODONE HYDROCHLORIDE 10 MG/1
10 TABLET ORAL EVERY 4 HOURS PRN
Status: DISCONTINUED | OUTPATIENT
Start: 2023-12-12 | End: 2023-12-13 | Stop reason: HOSPADM

## 2023-12-12 RX ORDER — ONDANSETRON 2 MG/ML
4 INJECTION INTRAMUSCULAR; INTRAVENOUS EVERY 8 HOURS PRN
Status: DISCONTINUED | OUTPATIENT
Start: 2023-12-12 | End: 2023-12-13 | Stop reason: HOSPADM

## 2023-12-12 RX ORDER — SODIUM CHLORIDE 0.9 % (FLUSH) 0.9 %
5-40 SYRINGE (ML) INJECTION PRN
Status: DISCONTINUED | OUTPATIENT
Start: 2023-12-12 | End: 2023-12-13 | Stop reason: HOSPADM

## 2023-12-12 RX ORDER — LOSARTAN POTASSIUM 50 MG/1
100 TABLET ORAL DAILY
Status: DISCONTINUED | OUTPATIENT
Start: 2023-12-13 | End: 2023-12-13 | Stop reason: HOSPADM

## 2023-12-12 RX ORDER — SODIUM CHLORIDE 0.9 % (FLUSH) 0.9 %
5-40 SYRINGE (ML) INJECTION EVERY 12 HOURS SCHEDULED
Status: DISCONTINUED | OUTPATIENT
Start: 2023-12-12 | End: 2023-12-13 | Stop reason: HOSPADM

## 2023-12-12 RX ORDER — BUPROPION HYDROCHLORIDE 150 MG/1
150 TABLET ORAL DAILY
Status: DISCONTINUED | OUTPATIENT
Start: 2023-12-13 | End: 2023-12-13 | Stop reason: HOSPADM

## 2023-12-12 RX ORDER — SODIUM CHLORIDE 9 MG/ML
INJECTION, SOLUTION INTRAVENOUS PRN
Status: DISCONTINUED | OUTPATIENT
Start: 2023-12-12 | End: 2023-12-13 | Stop reason: HOSPADM

## 2023-12-12 RX ORDER — ACETAMINOPHEN 325 MG/1
650 TABLET ORAL EVERY 6 HOURS PRN
Status: DISCONTINUED | OUTPATIENT
Start: 2023-12-12 | End: 2023-12-13 | Stop reason: HOSPADM

## 2023-12-12 RX ORDER — OXYCODONE HYDROCHLORIDE 5 MG/1
5 TABLET ORAL EVERY 4 HOURS PRN
Status: DISCONTINUED | OUTPATIENT
Start: 2023-12-12 | End: 2023-12-13 | Stop reason: HOSPADM

## 2023-12-12 RX ADMIN — MIDAZOLAM HYDROCHLORIDE 0.5 MG: 1 INJECTION, SOLUTION INTRAMUSCULAR; INTRAVENOUS at 10:47

## 2023-12-12 RX ADMIN — MIDAZOLAM HYDROCHLORIDE 1 MG: 1 INJECTION, SOLUTION INTRAMUSCULAR; INTRAVENOUS at 10:30

## 2023-12-12 RX ADMIN — SODIUM CHLORIDE, PRESERVATIVE FREE 10 ML: 5 INJECTION INTRAVENOUS at 21:56

## 2023-12-12 RX ADMIN — ACETAMINOPHEN 500 MG: 500 TABLET ORAL at 20:05

## 2023-12-12 RX ADMIN — SODIUM CHLORIDE: 9 INJECTION, SOLUTION INTRAVENOUS at 08:04

## 2023-12-12 RX ADMIN — FENTANYL CITRATE 25 MCG: 50 INJECTION INTRAMUSCULAR; INTRAVENOUS at 10:47

## 2023-12-12 RX ADMIN — SODIUM CHLORIDE: 9 INJECTION, SOLUTION INTRAVENOUS at 17:00

## 2023-12-12 RX ADMIN — FENTANYL CITRATE 50 MCG: 50 INJECTION INTRAMUSCULAR; INTRAVENOUS at 10:30

## 2023-12-12 RX ADMIN — MIDAZOLAM HYDROCHLORIDE 0.5 MG: 1 INJECTION, SOLUTION INTRAMUSCULAR; INTRAVENOUS at 10:40

## 2023-12-12 RX ADMIN — FENTANYL CITRATE 25 MCG: 50 INJECTION INTRAMUSCULAR; INTRAVENOUS at 10:40

## 2023-12-12 RX ADMIN — TRAZODONE HYDROCHLORIDE 50 MG: 50 TABLET ORAL at 21:52

## 2023-12-12 ASSESSMENT — PAIN SCALES - GENERAL
PAINLEVEL_OUTOF10: 0
PAINLEVEL_OUTOF10: 0

## 2023-12-12 ASSESSMENT — PAIN DESCRIPTION - DESCRIPTORS: DESCRIPTORS: ACHING

## 2023-12-12 ASSESSMENT — PAIN - FUNCTIONAL ASSESSMENT: PAIN_FUNCTIONAL_ASSESSMENT: 0-10

## 2023-12-12 NOTE — PERIOP NOTE
TRANSFER - OUT REPORT:    Verbal report given to [address] on Shailesh Cruz  being transferred to ROOM 2215 for routine progression of patient care        Report consisted of patient's Situation, Background, Assessment and   Recommendations(SBAR). Information from the following report(s) Nurse Handoff Report and Surgery Report was reviewed with the receiving nurse. Lines:   Peripheral IV 12/12/23 Left;Proximal;Anterior Forearm (Active)        Opportunity for questions and clarification was provided.       Patient transported with:  Last Size

## 2023-12-12 NOTE — OR NURSING
TRANSFER - OUT REPORT:    Verbal report given to Le Salmeron RN on Fabian Bonds  being transferred to phase II for routine post-op       Report consisted of patient's Situation, Background, Assessment and   Recommendations(SBAR). Information from the following report(s) Nurse Handoff Report was reviewed with the receiving nurse. Lines:   Peripheral IV 12/12/23 Left;Proximal;Anterior Forearm (Active)        Opportunity for questions and clarification was provided.       Patient transported with:  Registered Nurse

## 2023-12-12 NOTE — H&P
History and Physical    Patient: Carolina Major MRN: [id number]  SSN: [id number]    YOB: 1970  Age: 52 y.o. Sex: female      Joretta Bloch, MD    C/C : S/p renal biopsy    Subjective:      Carolina Major is a 52 y.o. female with PMH of mixed connective tissue disorder, worsening proteinuria, today admitted for renal biopsy, s/p renal biopsy successful, medicine is consulted for medical issues and postprocedure observation patient for any hemorrhage or other complications. Patient also denies any fever chills rigors no chest pain palpitation    Will put patient on observation      Past Medical History:   Diagnosis Date    Arthritis     Basal cell carcinoma of skin 2017    sees derm q6 months    Hypothyroid     MGUS (monoclonal gammopathy of unknown significance)     Migraine 1/4/2010    Positive JOVITA (antinuclear antibody) 5/2013    RA (rheumatoid arthritis) ([address] )     sees Dr. Savannah Arvizu as needed. Check sed rate and CRP q6 months    S/P colonoscopy 01/24/2019    Dr. Adriel Ibarra; normal; f/u 10 years; random colon biopy-unremarkable colonic mucosa      Past Surgical History:   Procedure Laterality Date    [facility] (replaced silicone with saline)    x2    CT BONE MARROW BIOPSY  4/10/2023    CT BONE MARROW BIOPSY 4/10/2023    TUBAL LIGATION        Family History   Problem Relation Age of Onset    Diabetes Maternal Grandfather     Hypertension Maternal Grandfather     Other Son         ADD    Migraines Mother     Colon Cancer Maternal Grandfather     Cancer Sister         melanoma, skin    Hypertension Maternal Grandmother     Diabetes Maternal Grandmother     Stroke Mother      Social History     Tobacco Use    Smoking status: Never    Smokeless tobacco: Never   Substance Use Topics    Alcohol use: Yes      Prior to Admission medications    Medication Sig Start Date End Date Taking?  Authorizing Provider   traZODone (DESYREL) 100 MG tablet Take 0.5-1 tablets by mouth nightly

## 2023-12-12 NOTE — PROGRESS NOTES
Received report from [place]. Patient received from Pacu stable and alert and oriented x4. Patient was set up and educated on call bell and incentative spirometry use.

## 2023-12-12 NOTE — PROGRESS NOTES
Patient resting in bed watching TV. No complaints of pain. No signs and symptoms of bleeding noted. Call bell in use.

## 2023-12-13 VITALS
OXYGEN SATURATION: 97 % | SYSTOLIC BLOOD PRESSURE: 137 MMHG | HEART RATE: 77 BPM | WEIGHT: 136 LBS | BODY MASS INDEX: 22.66 KG/M2 | TEMPERATURE: 98.1 F | DIASTOLIC BLOOD PRESSURE: 85 MMHG | RESPIRATION RATE: 17 BRPM | HEIGHT: 65 IN

## 2023-12-13 PROBLEM — G89.18 POST PROCEDURE DISCOMFORT: Status: RESOLVED | Noted: 2023-12-12 | Resolved: 2023-12-13

## 2023-12-13 LAB
ANION GAP SERPL CALC-SCNC: 4 MMOL/L (ref 3–18)
BUN SERPL-MCNC: 12 MG/DL (ref 7–18)
BUN/CREAT SERPL: 14 (ref 12–20)
CALCIUM SERPL-MCNC: 8.4 MG/DL (ref 8.5–10.1)
CHLORIDE SERPL-SCNC: 112 MMOL/L (ref 100–111)
CO2 SERPL-SCNC: 24 MMOL/L (ref 21–32)
CREAT SERPL-MCNC: 0.88 MG/DL (ref 0.6–1.3)
GLUCOSE SERPL-MCNC: 86 MG/DL (ref 74–99)
HCT VFR BLD AUTO: 30.2 % (ref 35–45)
HGB BLD-MCNC: 9.8 G/DL (ref 12–16)
POTASSIUM SERPL-SCNC: 4.1 MMOL/L (ref 3.5–5.5)
SODIUM SERPL-SCNC: 140 MMOL/L (ref 136–145)

## 2023-12-13 PROCEDURE — G0378 HOSPITAL OBSERVATION PER HR: HCPCS

## 2023-12-13 PROCEDURE — 99239 HOSP IP/OBS DSCHRG MGMT >30: CPT | Performed by: HOSPITALIST

## 2023-12-13 PROCEDURE — 85018 HEMOGLOBIN: CPT

## 2023-12-13 PROCEDURE — 80048 BASIC METABOLIC PNL TOTAL CA: CPT

## 2023-12-13 PROCEDURE — 36415 COLL VENOUS BLD VENIPUNCTURE: CPT

## 2023-12-13 PROCEDURE — 85014 HEMATOCRIT: CPT

## 2023-12-13 PROCEDURE — 6370000000 HC RX 637 (ALT 250 FOR IP): Performed by: INTERNAL MEDICINE

## 2023-12-13 RX ADMIN — HYDROXYCHLOROQUINE SULFATE 200 MG: 200 TABLET ORAL at 08:01

## 2023-12-13 RX ADMIN — BUPROPION HYDROCHLORIDE 150 MG: 150 TABLET, EXTENDED RELEASE ORAL at 08:02

## 2023-12-13 RX ADMIN — LOSARTAN POTASSIUM 100 MG: 50 TABLET, FILM COATED ORAL at 08:01

## 2023-12-13 ASSESSMENT — PAIN SCALES - GENERAL
PAINLEVEL_OUTOF10: 0

## 2023-12-13 NOTE — DISCHARGE SUMMARY
and keep a list of the medication names, dosages, and times to be taken in your wallet. Do not take other medications without consulting your doctor. DIET:  cardiac diet    ACTIVITY: activity as tolerated    ADDITIONAL INFORMATION: If you experience any of the following symptoms but not limited to Fever, chills, nausea, vomiting, diarrhea, change in mentation, falling, bleeding, shortness of breath, chest pain, please call your primary care physician or return to the emergency room if you cannot get hold of your doctor:     FOLLOW UP CARE:    FOLLOW UP CARE:   Follow-up Information     Bernard Gold MD. Schedule an appointment as soon as possible for a visit   in 1 week(s). Specialty: Family Medicine  Contact information:  [address]  [facility] [address] [facility]             Hayley Grimes MD. Schedule an appointment as soon as possible for a   visit in 1 week(s). Specialty: Nephrology  Contact information:  [address]  [address] [facility]  [phone]                       Minutes spent on discharge: 40 minutes spent coordinating this discharge (review instructions/follow-up, prescriptions, preparing report for sign off)    Kathaleen Goldberg, MD  12/13/2023 11:30 AM    Disclaimer: Sections of this note are dictated using utilizing voice recognition software. Minor typographical errors may be present. If questions arise, please do not hesitate to contact me or call our department.

## 2023-12-13 NOTE — PROGRESS NOTES
conducted a post-surgery visit with Killian Stevens, who is a 52 y.o.,female. The  provided the following Interventions:  Initiated a relationship of care and support. Patient does not have an advance directive here in file but says that there is one in her my chart. Offered prayer and assurance of continued prayers on patient's behalf. Plan:  Chaplains will continue to follow and will provide pastoral care on an as needed/requested basis.  recommends bedside caregivers page  on duty if patient shows signs of acute spiritual or emotional distress.   [facility]   Board Certified [facility]   [phone]

## 2023-12-13 NOTE — PLAN OF CARE
Problem: Pain  Goal: Verbalizes/displays adequate comfort level or baseline comfort level  12/13/2023 0050 by Roc Chapman RN  Outcome: Progressing  12/12/2023 1744 by Richmond Burris RN  Outcome: Progressing

## 2023-12-13 NOTE — DISCHARGE INSTRUCTIONS
Discharge Instructions    Patient: Melissa Melgar MRN: [id number]  [facility]: [id number]    YOB: 1970  Age: 52 y.o.   Sex: female    DOA: 12/12/2023       DIET:  cardiac diet    ACTIVITY: activity as tolerated    ADDITIONAL INFORMATION: If you experience any of the following symptoms but not limited to Fever, chills, nausea, vomiting, diarrhea, change in mentation, falling, bleeding, shortness of breath, chest pain, please call your primary care physician or return to the emergency room if you cannot get hold of your doctor:     FOLLOW UP CARE:      Mer Connell MD  12/13/2023 11:29 AM

## 2023-12-13 NOTE — PROGRESS NOTES
Informed patient that she has to be move to another unit for tele monitoring per MD order for closed observation after her renal biopsy. Patient stated that she doesn't feel bad and has no medical history related to her heart. Patient refused to be move and wanted to stay in  2 surgical. Patient is aware that this unit is unable to place her on tele monitoring. Patient verbalized understanding. Supervisor and Bertha Contreras, RN, nurse in charge notified and made aware.

## 2023-12-13 NOTE — CARE COORDINATION
SW reviewed patient's chart. Patient remains on observation following biopsy procedure. Dispo for home after medically cleared. No CM needs identified, however SW is monitoring today for any potential disposition needs.      Daniel Niño LMSW   Case Management

## 2023-12-13 NOTE — PROGRESS NOTES
AOx4, not in distress, denies chest pain, voiced no complaints at this time. Dressing to L flank remain clean, dry and intact. Voiding without difficulty, urine remain in yellow color. No signs of bleeding. Uneventful nights.

## 2023-12-16 RX ORDER — TRAZODONE HYDROCHLORIDE 100 MG/1
TABLET ORAL
Qty: 30 TABLET | Refills: 11 | Status: SHIPPED | OUTPATIENT
Start: 2023-12-16

## 2024-01-16 ENCOUNTER — OFFICE VISIT (OUTPATIENT)
Age: 54
End: 2024-01-16
Payer: COMMERCIAL

## 2024-01-16 VITALS
TEMPERATURE: 98.8 F | OXYGEN SATURATION: 99 % | HEIGHT: 65 IN | WEIGHT: 133 LBS | HEART RATE: 82 BPM | RESPIRATION RATE: 18 BRPM | BODY MASS INDEX: 22.16 KG/M2 | SYSTOLIC BLOOD PRESSURE: 142 MMHG | DIASTOLIC BLOOD PRESSURE: 80 MMHG

## 2024-01-16 DIAGNOSIS — J06.9 VIRAL URI: Primary | ICD-10-CM

## 2024-01-16 PROBLEM — M06.9 RHEUMATOID ARTHRITIS (HCC): Status: RESOLVED | Noted: 2023-09-20 | Resolved: 2024-01-16

## 2024-01-16 PROCEDURE — 3017F COLORECTAL CA SCREEN DOC REV: CPT | Performed by: FAMILY MEDICINE

## 2024-01-16 PROCEDURE — 1036F TOBACCO NON-USER: CPT | Performed by: FAMILY MEDICINE

## 2024-01-16 PROCEDURE — G8482 FLU IMMUNIZE ORDER/ADMIN: HCPCS | Performed by: FAMILY MEDICINE

## 2024-01-16 PROCEDURE — G8420 CALC BMI NORM PARAMETERS: HCPCS | Performed by: FAMILY MEDICINE

## 2024-01-16 PROCEDURE — 99212 OFFICE O/P EST SF 10 MIN: CPT | Performed by: FAMILY MEDICINE

## 2024-01-16 PROCEDURE — G8427 DOCREV CUR MEDS BY ELIG CLIN: HCPCS | Performed by: FAMILY MEDICINE

## 2024-01-16 RX ORDER — PREDNISONE 20 MG/1
20 TABLET ORAL 2 TIMES DAILY
COMMUNITY
Start: 2023-12-27

## 2024-01-16 RX ORDER — GUAIFENESIN AND DEXTROMETHORPHAN HYDROBROMIDE 600; 30 MG/1; MG/1
TABLET, EXTENDED RELEASE ORAL
COMMUNITY
Start: 2024-01-14 | End: 2024-01-16

## 2024-01-16 RX ORDER — AMLODIPINE BESYLATE 10 MG/1
10 TABLET ORAL DAILY
COMMUNITY
Start: 2023-12-26

## 2024-01-16 RX ORDER — MYCOPHENOLATE MOFETIL 500 MG/1
1000 TABLET ORAL 2 TIMES DAILY
COMMUNITY

## 2024-01-16 RX ORDER — CEFDINIR 300 MG/1
300 CAPSULE ORAL 2 TIMES DAILY
COMMUNITY
Start: 2024-01-14

## 2024-01-16 ASSESSMENT — ANXIETY QUESTIONNAIRES
5. BEING SO RESTLESS THAT IT IS HARD TO SIT STILL: 0
7. FEELING AFRAID AS IF SOMETHING AWFUL MIGHT HAPPEN: 0
GAD7 TOTAL SCORE: 2
3. WORRYING TOO MUCH ABOUT DIFFERENT THINGS: 0
4. TROUBLE RELAXING: 0
6. BECOMING EASILY ANNOYED OR IRRITABLE: 0
2. NOT BEING ABLE TO STOP OR CONTROL WORRYING: 1
1. FEELING NERVOUS, ANXIOUS, OR ON EDGE: 1
IF YOU CHECKED OFF ANY PROBLEMS ON THIS QUESTIONNAIRE, HOW DIFFICULT HAVE THESE PROBLEMS MADE IT FOR YOU TO DO YOUR WORK, TAKE CARE OF THINGS AT HOME, OR GET ALONG WITH OTHER PEOPLE: NOT DIFFICULT AT ALL

## 2024-01-16 ASSESSMENT — PATIENT HEALTH QUESTIONNAIRE - PHQ9
8. MOVING OR SPEAKING SO SLOWLY THAT OTHER PEOPLE COULD HAVE NOTICED. OR THE OPPOSITE, BEING SO FIGETY OR RESTLESS THAT YOU HAVE BEEN MOVING AROUND A LOT MORE THAN USUAL: 0
SUM OF ALL RESPONSES TO PHQ QUESTIONS 1-9: 4
SUM OF ALL RESPONSES TO PHQ QUESTIONS 1-9: 4
2. FEELING DOWN, DEPRESSED OR HOPELESS: 1
SUM OF ALL RESPONSES TO PHQ9 QUESTIONS 1 & 2: 2
6. FEELING BAD ABOUT YOURSELF - OR THAT YOU ARE A FAILURE OR HAVE LET YOURSELF OR YOUR FAMILY DOWN: 0
7. TROUBLE CONCENTRATING ON THINGS, SUCH AS READING THE NEWSPAPER OR WATCHING TELEVISION: 1
1. LITTLE INTEREST OR PLEASURE IN DOING THINGS: 1
SUM OF ALL RESPONSES TO PHQ QUESTIONS 1-9: 4
4. FEELING TIRED OR HAVING LITTLE ENERGY: 0
9. THOUGHTS THAT YOU WOULD BE BETTER OFF DEAD, OR OF HURTING YOURSELF: 0
SUM OF ALL RESPONSES TO PHQ QUESTIONS 1-9: 4
DEPRESSION UNABLE TO ASSESS: FUNCTIONAL CAPACITY MOTIVATION LIMITS ACCURACY
10. IF YOU CHECKED OFF ANY PROBLEMS, HOW DIFFICULT HAVE THESE PROBLEMS MADE IT FOR YOU TO DO YOUR WORK, TAKE CARE OF THINGS AT HOME, OR GET ALONG WITH OTHER PEOPLE: 1
3. TROUBLE FALLING OR STAYING ASLEEP: 1
5. POOR APPETITE OR OVEREATING: 0

## 2024-01-16 NOTE — PROGRESS NOTES
SUBJECTIVE  Chief Complaint   Patient presents with    Cough     Ongoing X 1 week green to brownish mucous (Negative for COVID)     Diarrhea     Onset this morning 01/16/2024     The patient presents complaining of a 1+ week history of nasal congestion and drainage.  The patient also complains of a cough that is described as productive of green / brown mucus.  She did test for covid about 6-7 days in.   She did test negative.  The patient denies sinus pressure or headaches.  The patient denies sorethroat.  The patient did have the feeling of being hot but did not have a thermometer.  The patient denies any nausea or vomiting.   Diarrhea for one day has resolved but she says she gets that intermittently. The patient denies chest pain, chest tightness, or shortness of breath.  The patient has tried taking Mucinex today without significant relief.  She did see her insurance on-call telehealth provider who started her on omnicef.    OBJECTIVE    Blood pressure (!) 142/80, pulse 82, temperature 98.8 °F (37.1 °C), temperature source Temporal, resp. rate 18, height 1.651 m (5' 5\"), weight 60.3 kg (133 lb), SpO2 99 %.   General:  Alert, cooperative, well appearing, in no apparent distress.  Eyes:   The lids are without swelling, lesions, or drainage.  The conjunctiva is clear and noninjected.  ENT:  The external auditory canals are without swelling or drainage.  The tympanic membranes are intact, clear, and non-erythematous.  The tongue and mucous membranes are pink and moist without lesions.  The pharynx is not erythematous and is without exudates.    Neck: supple without adenopathy  CV:  The heart sounds are regular in rate and rhythm.  There is a normal S1 and S2.  There or no murmurs, rubs, or gallops.    Lungs:  Inspiratory and expiratory efforts are full and unlabored.  Lung sounds are clear and equal to auscultation throughout all lung fields without wheezing, rales, or rhonchi.  Skin:  No rashes, no

## 2024-01-16 NOTE — PROGRESS NOTES
Chief Complaint   Patient presents with    Cough     Ongoing X 1 week green to brownish mucous (Negative for COVID)     Diarrhea     Onset this morning 01/16/2024      1. \"Have you been to the ER, urgent care clinic since your last visit?  Hospitalized since your last visit?\" Yes 12/12/2023 to 12/13/2023 [facility] for CKD and virtual with insurance company on 01/14/2024 negative COVID     2. \"Have you seen or consulted any other health care providers outside of the [facility] since your last visit?\" Yes 12/26/2023 nephrology on 12/26/2023 for CKD      3. For patients aged 45-75: Has the patient had a colonoscopy / FIT/ Cologuard? Yes - no Care Gap present due 01/24/2029      If the patient is female:    4. For patients aged 40-74: Has the patient had a mammogram within the past 2 years? Yes - Care Gap present. Most recent result on file was due 10/31/2023      5. For patients aged 21-65: Has the patient had a pap smear? Yes - Care Gap present. Most recent result on file was due 02/04/2023 (confirmed with Zayda she has not had a pap smear)

## 2024-01-27 ENCOUNTER — HOSPITAL ENCOUNTER (OUTPATIENT)
Facility: HOSPITAL | Age: 54
Discharge: HOME | End: 2024-01-30
Payer: COMMERCIAL

## 2024-01-27 LAB
ALBUMIN SERPL-MCNC: 3.1 G/DL (ref 3.4–5)
ALBUMIN SERPL-MCNC: 3.2 G/DL (ref 3.4–5)
ALBUMIN/GLOB SERPL: 0.8 (ref 0.8–1.7)
ALP SERPL-CCNC: 88 U/L (ref 45–117)
ALT SERPL-CCNC: 25 U/L (ref 13–56)
ANION GAP SERPL CALC-SCNC: 6 MMOL/L (ref 3–18)
APPEARANCE UR: CLEAR
AST SERPL-CCNC: 13 U/L (ref 10–38)
BACTERIA URNS QL MICRO: NEGATIVE /HPF
BILIRUB DIRECT SERPL-MCNC: <0.1 MG/DL (ref 0–0.2)
BILIRUB SERPL-MCNC: 0.8 MG/DL (ref 0.2–1)
BILIRUB UR QL: NEGATIVE
BUN SERPL-MCNC: 16 MG/DL (ref 7–18)
BUN/CREAT SERPL: 16 (ref 12–20)
CALCIUM SERPL-MCNC: 9.2 MG/DL (ref 8.5–10.1)
CHLORIDE SERPL-SCNC: 104 MMOL/L (ref 100–111)
CO2 SERPL-SCNC: 27 MMOL/L (ref 21–32)
COLOR UR: YELLOW
CREAT SERPL-MCNC: 1.02 MG/DL (ref 0.6–1.3)
CREAT UR-MCNC: 19 MG/DL (ref 30–125)
EPITH CASTS URNS QL MICRO: NORMAL /LPF (ref 0–5)
ERYTHROCYTE [DISTWIDTH] IN BLOOD BY AUTOMATED COUNT: 14.6 % (ref 11.6–14.5)
GLOBULIN SER CALC-MCNC: 3.7 G/DL (ref 2–4)
GLUCOSE SERPL-MCNC: 91 MG/DL (ref 74–99)
GLUCOSE UR STRIP.AUTO-MCNC: NEGATIVE MG/DL
HCT VFR BLD AUTO: 37.4 % (ref 35–45)
HGB BLD-MCNC: 11.3 G/DL (ref 12–16)
HGB UR QL STRIP: NEGATIVE
KETONES UR QL STRIP.AUTO: NEGATIVE MG/DL
LEUKOCYTE ESTERASE UR QL STRIP.AUTO: NEGATIVE
MCH RBC QN AUTO: 28.1 PG (ref 24–34)
MCHC RBC AUTO-ENTMCNC: 30.2 G/DL (ref 31–37)
MCV RBC AUTO: 93 FL (ref 78–100)
MICROALBUMIN UR-MCNC: 25.9 MG/DL (ref 0–3)
MICROALBUMIN/CREAT UR-RTO: 1363 MG/G (ref 0–30)
NITRITE UR QL STRIP.AUTO: NEGATIVE
NRBC # BLD: 0 K/UL (ref 0–0.01)
NRBC BLD-RTO: 0 PER 100 WBC
PH UR STRIP: 6.5 (ref 5–8)
PHOSPHATE SERPL-MCNC: 3.2 MG/DL (ref 2.5–4.9)
PLATELET # BLD AUTO: 390 K/UL (ref 135–420)
PMV BLD AUTO: 8.6 FL (ref 9.2–11.8)
POTASSIUM SERPL-SCNC: 3.9 MMOL/L (ref 3.5–5.5)
PROT SERPL-MCNC: 6.8 G/DL (ref 6.4–8.2)
PROT UR STRIP-MCNC: 30 MG/DL
RBC # BLD AUTO: 4.02 M/UL (ref 4.2–5.3)
RBC #/AREA URNS HPF: NEGATIVE /HPF (ref 0–5)
SODIUM SERPL-SCNC: 137 MMOL/L (ref 136–145)
SP GR UR REFRACTOMETRY: 1 (ref 1–1.03)
UROBILINOGEN UR QL STRIP.AUTO: 0.2 EU/DL (ref 0.2–1)
WBC # BLD AUTO: 8.9 K/UL (ref 4.6–13.2)
WBC URNS QL MICRO: NEGATIVE /HPF (ref 0–4)

## 2024-01-27 PROCEDURE — 86160 COMPLEMENT ANTIGEN: CPT

## 2024-01-27 PROCEDURE — 82570 ASSAY OF URINE CREATININE: CPT

## 2024-01-27 PROCEDURE — 36415 COLL VENOUS BLD VENIPUNCTURE: CPT

## 2024-01-27 PROCEDURE — 85027 COMPLETE CBC AUTOMATED: CPT

## 2024-01-27 PROCEDURE — 82043 UR ALBUMIN QUANTITATIVE: CPT

## 2024-01-27 PROCEDURE — 80069 RENAL FUNCTION PANEL: CPT

## 2024-01-27 PROCEDURE — 80076 HEPATIC FUNCTION PANEL: CPT

## 2024-01-27 PROCEDURE — 81001 URINALYSIS AUTO W/SCOPE: CPT

## 2024-01-27 PROCEDURE — 86225 DNA ANTIBODY NATIVE: CPT

## 2024-01-30 LAB
C3 SERPL-MCNC: 99 MG/DL (ref 82–167)
C4 SERPL-MCNC: 9 MG/DL (ref 12–38)

## 2024-01-31 LAB — DSDNA AB SER-ACNC: 241 IU/ML (ref 0–9)

## 2024-03-13 ENCOUNTER — TELEPHONE (OUTPATIENT)
Age: 54
End: 2024-03-13

## 2024-03-13 NOTE — TELEPHONE ENCOUNTER
Spoke with Mrs. Raygoza to advise that her lab order for TSH is in the system from 09/20/2023. Mrs. Raygoza voice understanding.

## 2024-03-13 NOTE — TELEPHONE ENCOUNTER
----- Message from Neena Hernandez sent at 3/13/2024  9:46 AM EDT -----  Subject: Message to Provider    QUESTIONS  Information for Provider? Pt wants labs ordered for upcoming appt   ---------------------------------------------------------------------------  --------------  CALL BACK INFO  [phone]; OK to leave message on voicemail  ---------------------------------------------------------------------------  --------------  SCRIPT ANSWERS  Relationship to Patient? Self

## 2024-03-14 ENCOUNTER — HOSPITAL ENCOUNTER (OUTPATIENT)
Facility: HOSPITAL | Age: 54
Discharge: HOME | End: 2024-03-14
Payer: COMMERCIAL

## 2024-03-14 LAB
ALBUMIN SERPL-MCNC: 3.7 G/DL (ref 3.4–5)
ANION GAP SERPL CALC-SCNC: 6 MMOL/L (ref 3–18)
BUN SERPL-MCNC: 13 MG/DL (ref 7–18)
BUN/CREAT SERPL: 14 (ref 12–20)
CALCIUM SERPL-MCNC: 9.6 MG/DL (ref 8.5–10.1)
CHLORIDE SERPL-SCNC: 107 MMOL/L (ref 100–111)
CO2 SERPL-SCNC: 28 MMOL/L (ref 21–32)
CREAT SERPL-MCNC: 0.94 MG/DL (ref 0.6–1.3)
CREAT UR-MCNC: <13 MG/DL (ref 30–125)
ERYTHROCYTE [DISTWIDTH] IN BLOOD BY AUTOMATED COUNT: 13.9 % (ref 11.6–14.5)
GLUCOSE SERPL-MCNC: 75 MG/DL (ref 74–99)
HCT VFR BLD AUTO: 39.9 % (ref 35–45)
HGB BLD-MCNC: 12.3 G/DL (ref 12–16)
MCH RBC QN AUTO: 28.7 PG (ref 24–34)
MCHC RBC AUTO-ENTMCNC: 30.8 G/DL (ref 31–37)
MCV RBC AUTO: 93 FL (ref 78–100)
MICROALBUMIN UR-MCNC: 18.3 MG/DL (ref 0–3)
MICROALBUMIN/CREAT UR-RTO: ABNORMAL MG/G (ref 0–30)
NRBC # BLD: 0 K/UL (ref 0–0.01)
NRBC BLD-RTO: 0 PER 100 WBC
PHOSPHATE SERPL-MCNC: 3.1 MG/DL (ref 2.5–4.9)
PLATELET # BLD AUTO: 335 K/UL (ref 135–420)
PMV BLD AUTO: 8.5 FL (ref 9.2–11.8)
POTASSIUM SERPL-SCNC: 3.9 MMOL/L (ref 3.5–5.5)
RBC # BLD AUTO: 4.29 M/UL (ref 4.2–5.3)
SODIUM SERPL-SCNC: 141 MMOL/L (ref 136–145)
TSH SERPL DL<=0.05 MIU/L-ACNC: 0.12 UIU/ML (ref 0.36–3.74)
WBC # BLD AUTO: 9.4 K/UL (ref 4.6–13.2)

## 2024-03-14 PROCEDURE — 82043 UR ALBUMIN QUANTITATIVE: CPT

## 2024-03-14 PROCEDURE — 82570 ASSAY OF URINE CREATININE: CPT

## 2024-03-14 PROCEDURE — 85027 COMPLETE CBC AUTOMATED: CPT

## 2024-03-14 PROCEDURE — 80069 RENAL FUNCTION PANEL: CPT

## 2024-03-14 PROCEDURE — 36415 COLL VENOUS BLD VENIPUNCTURE: CPT

## 2024-03-14 PROCEDURE — 84443 ASSAY THYROID STIM HORMONE: CPT

## 2024-03-19 PROBLEM — M32.9 LUPUS (HCC): Status: ACTIVE | Noted: 2024-03-19

## 2024-03-19 NOTE — PATIENT INSTRUCTIONS

## 2024-03-20 ENCOUNTER — OFFICE VISIT (OUTPATIENT)
Age: 54
End: 2024-03-20
Payer: COMMERCIAL

## 2024-03-20 VITALS
BODY MASS INDEX: 21.99 KG/M2 | HEIGHT: 65 IN | RESPIRATION RATE: 18 BRPM | TEMPERATURE: 98.4 F | SYSTOLIC BLOOD PRESSURE: 136 MMHG | HEART RATE: 76 BPM | DIASTOLIC BLOOD PRESSURE: 76 MMHG | OXYGEN SATURATION: 98 % | WEIGHT: 132 LBS

## 2024-03-20 DIAGNOSIS — D47.2 MGUS (MONOCLONAL GAMMOPATHY OF UNKNOWN SIGNIFICANCE): ICD-10-CM

## 2024-03-20 DIAGNOSIS — M06.9 RHEUMATOID ARTHRITIS, INVOLVING UNSPECIFIED SITE, UNSPECIFIED WHETHER RHEUMATOID FACTOR PRESENT (HCC): ICD-10-CM

## 2024-03-20 DIAGNOSIS — M32.9 SYSTEMIC LUPUS ERYTHEMATOSUS, UNSPECIFIED SLE TYPE, UNSPECIFIED ORGAN INVOLVEMENT STATUS (HCC): ICD-10-CM

## 2024-03-20 DIAGNOSIS — N20.0 NEPHROLITHIASIS: ICD-10-CM

## 2024-03-20 DIAGNOSIS — Z85.828 HISTORY OF BASAL CELL CANCER: ICD-10-CM

## 2024-03-20 DIAGNOSIS — E03.9 ACQUIRED HYPOTHYROIDISM: ICD-10-CM

## 2024-03-20 DIAGNOSIS — F33.0 MILD EPISODE OF RECURRENT MAJOR DEPRESSIVE DISORDER (HCC): ICD-10-CM

## 2024-03-20 DIAGNOSIS — I10 ESSENTIAL (PRIMARY) HYPERTENSION: Primary | ICD-10-CM

## 2024-03-20 PROCEDURE — 3017F COLORECTAL CA SCREEN DOC REV: CPT | Performed by: FAMILY MEDICINE

## 2024-03-20 PROCEDURE — G8482 FLU IMMUNIZE ORDER/ADMIN: HCPCS | Performed by: FAMILY MEDICINE

## 2024-03-20 PROCEDURE — G8427 DOCREV CUR MEDS BY ELIG CLIN: HCPCS | Performed by: FAMILY MEDICINE

## 2024-03-20 PROCEDURE — 1036F TOBACCO NON-USER: CPT | Performed by: FAMILY MEDICINE

## 2024-03-20 PROCEDURE — 3078F DIAST BP <80 MM HG: CPT | Performed by: FAMILY MEDICINE

## 2024-03-20 PROCEDURE — 99214 OFFICE O/P EST MOD 30 MIN: CPT | Performed by: FAMILY MEDICINE

## 2024-03-20 PROCEDURE — G8420 CALC BMI NORM PARAMETERS: HCPCS | Performed by: FAMILY MEDICINE

## 2024-03-20 PROCEDURE — 3075F SYST BP GE 130 - 139MM HG: CPT | Performed by: FAMILY MEDICINE

## 2024-03-20 RX ORDER — BUPROPION HYDROCHLORIDE 150 MG/1
TABLET ORAL
Qty: 90 TABLET | Refills: 1 | Status: SHIPPED | OUTPATIENT
Start: 2024-03-20

## 2024-03-20 NOTE — PROGRESS NOTES
SUBJECTIVE  Chief Complaint   Patient presents with    Results     Review of results     Chronic Kidney Disease    Depression    Hypertension    Hypothyroidism    MGUS    Nephrolithiasis     No new complaints.      She is seeing rheum, nephrology, and hematology.  She has been diagnosed with MGUS.  She says that she was not sure she had to keep seeing hematology since they are not doing much and things seem stable.  She is understandably having a bit of doctor fatigue.  She says that her diagnosis at this time seems to be SLE and not RA.  It is unclear.    No longer on synthroid and her prior normal TSH levels have actually gone low.  She has no symptoms consistent with hyperthyroidism.     Taking Wellbutrin which is working well.  She had side effects at higher doses.     OBJECTIVE    Blood pressure 136/76, pulse 76, temperature 98.4 °F (36.9 °C), temperature source Temporal, resp. rate 18, height 1.651 m (5' 5\"), weight 59.9 kg (132 lb), SpO2 98 %.   General:  Alert, cooperative, well appearing, in no apparent distress.  CV:  The heart sounds are regular in rate and rhythm.  There is a normal S1 and S2.  There or no murmurs.   Lungs:  Inspiratory and expiratory efforts are full and unlabored.   Ext: no swelling.  Psych: Mood good consistent with affect.  Oriented x 3.  Judgement and insight intact.         ASSESSMENT / PLAN     Diagnosis Orders   1. Essential (primary) hypertension  CBC with Auto Differential    Comprehensive Metabolic Panel    Lipid Panel      2. Acquired hypothyroidism  TSH + Free T4 Panel    Thyroid Antibodies      3. Systemic lupus erythematosus, unspecified SLE type, unspecified organ involvement status (HCC)        4. Rheumatoid arthritis, involving unspecified site, unspecified whether rheumatoid factor present (HCC)        5. Mild episode of recurrent major depressive disorder (HCC)        6. MGUS (monoclonal gammopathy of unknown significance)        7. Nephrolithiasis        8. History

## 2024-06-08 ENCOUNTER — HOSPITAL ENCOUNTER (OUTPATIENT)
Facility: HOSPITAL | Age: 54
Discharge: HOME | End: 2024-06-11
Payer: COMMERCIAL

## 2024-06-08 LAB
ALBUMIN SERPL-MCNC: 3.3 G/DL (ref 3.4–5)
ANION GAP SERPL CALC-SCNC: 6 MMOL/L (ref 3–18)
APPEARANCE UR: CLEAR
BACTERIA URNS QL MICRO: ABNORMAL /HPF
BILIRUB UR QL: NEGATIVE
BUN SERPL-MCNC: 12 MG/DL (ref 7–18)
BUN/CREAT SERPL: 14 (ref 12–20)
CALCIUM SERPL-MCNC: 8.8 MG/DL (ref 8.5–10.1)
CHLORIDE SERPL-SCNC: 103 MMOL/L (ref 100–111)
CO2 SERPL-SCNC: 26 MMOL/L (ref 21–32)
COLOR UR: YELLOW
CREAT SERPL-MCNC: 0.84 MG/DL (ref 0.6–1.3)
CREAT UR-MCNC: 86 MG/DL (ref 30–125)
CREAT UR-MCNC: 86 MG/DL (ref 30–125)
EPITH CASTS URNS QL MICRO: ABNORMAL /LPF (ref 0–5)
ERYTHROCYTE [DISTWIDTH] IN BLOOD BY AUTOMATED COUNT: 12.3 % (ref 11.6–14.5)
GLUCOSE SERPL-MCNC: 82 MG/DL (ref 74–99)
GLUCOSE UR STRIP.AUTO-MCNC: NEGATIVE MG/DL
HCT VFR BLD AUTO: 39.1 % (ref 35–45)
HGB BLD-MCNC: 12.2 G/DL (ref 12–16)
HGB UR QL STRIP: ABNORMAL
KETONES UR QL STRIP.AUTO: NEGATIVE MG/DL
LEUKOCYTE ESTERASE UR QL STRIP.AUTO: NEGATIVE
MCH RBC QN AUTO: 28 PG (ref 24–34)
MCHC RBC AUTO-ENTMCNC: 31.2 G/DL (ref 31–37)
MCV RBC AUTO: 89.7 FL (ref 78–100)
MICROALBUMIN UR-MCNC: 31.3 MG/DL (ref 0–3)
MICROALBUMIN/CREAT UR-RTO: 364 MG/G (ref 0–30)
NITRITE UR QL STRIP.AUTO: NEGATIVE
NRBC # BLD: 0 K/UL (ref 0–0.01)
NRBC BLD-RTO: 0 PER 100 WBC
PH UR STRIP: 5.5 (ref 5–8)
PHOSPHATE SERPL-MCNC: 3.3 MG/DL (ref 2.5–4.9)
PLATELET # BLD AUTO: 289 K/UL (ref 135–420)
PMV BLD AUTO: 8.6 FL (ref 9.2–11.8)
POTASSIUM SERPL-SCNC: 4 MMOL/L (ref 3.5–5.5)
PROT UR STRIP-MCNC: 30 MG/DL
PROT UR-MCNC: 62 MG/DL
PROT/CREAT UR-RTO: 0.7
RBC # BLD AUTO: 4.36 M/UL (ref 4.2–5.3)
RBC #/AREA URNS HPF: ABNORMAL /HPF (ref 0–5)
SODIUM SERPL-SCNC: 135 MMOL/L (ref 136–145)
SP GR UR REFRACTOMETRY: 1.02 (ref 1–1.03)
UROBILINOGEN UR QL STRIP.AUTO: 0.2 EU/DL (ref 0.2–1)
WBC # BLD AUTO: 2.3 K/UL (ref 4.6–13.2)
WBC URNS QL MICRO: ABNORMAL /HPF (ref 0–4)

## 2024-06-08 PROCEDURE — 81001 URINALYSIS AUTO W/SCOPE: CPT

## 2024-06-08 PROCEDURE — 80069 RENAL FUNCTION PANEL: CPT

## 2024-06-08 PROCEDURE — 84156 ASSAY OF PROTEIN URINE: CPT

## 2024-06-08 PROCEDURE — 82043 UR ALBUMIN QUANTITATIVE: CPT

## 2024-06-08 PROCEDURE — 82570 ASSAY OF URINE CREATININE: CPT

## 2024-06-08 PROCEDURE — 36415 COLL VENOUS BLD VENIPUNCTURE: CPT

## 2024-06-08 PROCEDURE — 85027 COMPLETE CBC AUTOMATED: CPT

## 2024-07-22 ENCOUNTER — HOSPITAL ENCOUNTER (OUTPATIENT)
Facility: HOSPITAL | Age: 54
Discharge: HOME | End: 2024-07-25
Payer: COMMERCIAL

## 2024-07-22 LAB
ALBUMIN SERPL-MCNC: 3.8 G/DL (ref 3.4–5)
ANION GAP SERPL CALC-SCNC: 6 MMOL/L (ref 3–18)
APPEARANCE UR: CLEAR
BILIRUB UR QL: NEGATIVE
BUN SERPL-MCNC: 13 MG/DL (ref 7–18)
BUN/CREAT SERPL: 17 (ref 12–20)
CALCIUM SERPL-MCNC: 9.3 MG/DL (ref 8.5–10.1)
CHLORIDE SERPL-SCNC: 103 MMOL/L (ref 100–111)
CO2 SERPL-SCNC: 27 MMOL/L (ref 21–32)
COLOR UR: YELLOW
CREAT SERPL-MCNC: 0.77 MG/DL (ref 0.6–1.3)
CREAT UR-MCNC: 25 MG/DL (ref 30–125)
CREAT UR-MCNC: 28 MG/DL (ref 30–125)
EPITH CASTS URNS QL MICRO: NORMAL /LPF (ref 0–5)
ERYTHROCYTE [DISTWIDTH] IN BLOOD BY AUTOMATED COUNT: 13.1 % (ref 11.6–14.5)
GLUCOSE SERPL-MCNC: 88 MG/DL (ref 74–99)
GLUCOSE UR STRIP.AUTO-MCNC: NEGATIVE MG/DL
HCT VFR BLD AUTO: 40.4 % (ref 35–45)
HGB BLD-MCNC: 12.5 G/DL (ref 12–16)
HGB UR QL STRIP: ABNORMAL
KETONES UR QL STRIP.AUTO: NEGATIVE MG/DL
LEUKOCYTE ESTERASE UR QL STRIP.AUTO: NEGATIVE
MCH RBC QN AUTO: 28 PG (ref 24–34)
MCHC RBC AUTO-ENTMCNC: 30.9 G/DL (ref 31–37)
MCV RBC AUTO: 90.4 FL (ref 78–100)
MICROALBUMIN UR-MCNC: 3.84 MG/DL (ref 0–3)
MICROALBUMIN/CREAT UR-RTO: 137 MG/G (ref 0–30)
NITRITE UR QL STRIP.AUTO: NEGATIVE
NRBC # BLD: 0 K/UL (ref 0–0.01)
NRBC BLD-RTO: 0 PER 100 WBC
PH UR STRIP: 6 (ref 5–8)
PHOSPHATE SERPL-MCNC: 3.4 MG/DL (ref 2.5–4.9)
PLATELET # BLD AUTO: 315 K/UL (ref 135–420)
PMV BLD AUTO: 8.7 FL (ref 9.2–11.8)
POTASSIUM SERPL-SCNC: 4.7 MMOL/L (ref 3.5–5.5)
PROT UR STRIP-MCNC: NEGATIVE MG/DL
PROT UR-MCNC: 8 MG/DL
PROT/CREAT UR-RTO: 0.3
RBC # BLD AUTO: 4.47 M/UL (ref 4.2–5.3)
RBC #/AREA URNS HPF: NORMAL /HPF (ref 0–5)
SODIUM SERPL-SCNC: 136 MMOL/L (ref 136–145)
SP GR UR REFRACTOMETRY: 1 (ref 1–1.03)
UROBILINOGEN UR QL STRIP.AUTO: 0.2 EU/DL (ref 0.2–1)
WBC # BLD AUTO: 4 K/UL (ref 4.6–13.2)
WBC URNS QL MICRO: NORMAL /HPF (ref 0–4)

## 2024-07-22 PROCEDURE — 80069 RENAL FUNCTION PANEL: CPT

## 2024-07-22 PROCEDURE — 36415 COLL VENOUS BLD VENIPUNCTURE: CPT

## 2024-07-22 PROCEDURE — 82570 ASSAY OF URINE CREATININE: CPT

## 2024-07-22 PROCEDURE — 82043 UR ALBUMIN QUANTITATIVE: CPT

## 2024-07-22 PROCEDURE — 85027 COMPLETE CBC AUTOMATED: CPT

## 2024-07-22 PROCEDURE — 84156 ASSAY OF PROTEIN URINE: CPT

## 2024-07-22 PROCEDURE — 81001 URINALYSIS AUTO W/SCOPE: CPT

## 2024-09-21 ENCOUNTER — HOSPITAL ENCOUNTER (OUTPATIENT)
Facility: HOSPITAL | Age: 54
Discharge: HOME | End: 2024-09-24
Payer: COMMERCIAL

## 2024-09-21 DIAGNOSIS — I10 ESSENTIAL (PRIMARY) HYPERTENSION: ICD-10-CM

## 2024-09-21 DIAGNOSIS — E03.9 ACQUIRED HYPOTHYROIDISM: ICD-10-CM

## 2024-09-21 LAB
ALBUMIN SERPL-MCNC: 3.9 G/DL (ref 3.4–5)
ALBUMIN/GLOB SERPL: 1 (ref 0.8–1.7)
ALP SERPL-CCNC: 162 U/L (ref 45–117)
ALT SERPL-CCNC: 18 U/L (ref 13–56)
ANION GAP SERPL CALC-SCNC: 5 MMOL/L (ref 3–18)
AST SERPL-CCNC: 13 U/L (ref 10–38)
BASOPHILS # BLD: 0 K/UL (ref 0–0.1)
BASOPHILS NFR BLD: 0 % (ref 0–2)
BILIRUB SERPL-MCNC: 0.3 MG/DL (ref 0.2–1)
BUN SERPL-MCNC: 13 MG/DL (ref 7–18)
BUN/CREAT SERPL: 15 (ref 12–20)
CALCIUM SERPL-MCNC: 9.5 MG/DL (ref 8.5–10.1)
CHLORIDE SERPL-SCNC: 107 MMOL/L (ref 100–111)
CHOLEST SERPL-MCNC: 167 MG/DL
CO2 SERPL-SCNC: 26 MMOL/L (ref 21–32)
CREAT SERPL-MCNC: 0.85 MG/DL (ref 0.6–1.3)
DIFFERENTIAL METHOD BLD: ABNORMAL
EOSINOPHIL # BLD: 0.1 K/UL (ref 0–0.4)
EOSINOPHIL NFR BLD: 3 % (ref 0–5)
ERYTHROCYTE [DISTWIDTH] IN BLOOD BY AUTOMATED COUNT: 12.5 % (ref 11.6–14.5)
GLOBULIN SER CALC-MCNC: 4 G/DL (ref 2–4)
GLUCOSE SERPL-MCNC: 85 MG/DL (ref 74–99)
HCT VFR BLD AUTO: 44.1 % (ref 35–45)
HDLC SERPL-MCNC: 64 MG/DL (ref 40–60)
HDLC SERPL: 2.6 (ref 0–5)
HGB BLD-MCNC: 13.9 G/DL (ref 12–16)
IMM GRANULOCYTES # BLD AUTO: 0 K/UL (ref 0–0.04)
IMM GRANULOCYTES NFR BLD AUTO: 0 % (ref 0–0.5)
LDLC SERPL CALC-MCNC: 71.6 MG/DL (ref 0–100)
LIPID PANEL: ABNORMAL
LYMPHOCYTES # BLD: 1.4 K/UL (ref 0.9–3.6)
LYMPHOCYTES NFR BLD: 34 % (ref 21–52)
MCH RBC QN AUTO: 28.1 PG (ref 24–34)
MCHC RBC AUTO-ENTMCNC: 31.5 G/DL (ref 31–37)
MCV RBC AUTO: 89.1 FL (ref 78–100)
MONOCYTES # BLD: 0.5 K/UL (ref 0.05–1.2)
MONOCYTES NFR BLD: 13 % (ref 3–10)
NEUTS SEG # BLD: 2 K/UL (ref 1.8–8)
NEUTS SEG NFR BLD: 50 % (ref 40–73)
NRBC # BLD: 0 K/UL (ref 0–0.01)
NRBC BLD-RTO: 0 PER 100 WBC
PLATELET # BLD AUTO: 282 K/UL (ref 135–420)
PMV BLD AUTO: 8.7 FL (ref 9.2–11.8)
POTASSIUM SERPL-SCNC: 4.3 MMOL/L (ref 3.5–5.5)
PROT SERPL-MCNC: 7.9 G/DL (ref 6.4–8.2)
RBC # BLD AUTO: 4.95 M/UL (ref 4.2–5.3)
SODIUM SERPL-SCNC: 138 MMOL/L (ref 136–145)
T4 FREE SERPL-MCNC: 1 NG/DL (ref 0.7–1.5)
TRIGL SERPL-MCNC: 157 MG/DL
TSH SERPL DL<=0.05 MIU/L-ACNC: 0.03 UIU/ML (ref 0.36–3.74)
VLDLC SERPL CALC-MCNC: 31.4 MG/DL
WBC # BLD AUTO: 4.1 K/UL (ref 4.6–13.2)

## 2024-09-21 PROCEDURE — 86376 MICROSOMAL ANTIBODY EACH: CPT

## 2024-09-21 PROCEDURE — 36415 COLL VENOUS BLD VENIPUNCTURE: CPT

## 2024-09-21 PROCEDURE — 86800 THYROGLOBULIN ANTIBODY: CPT

## 2024-09-21 PROCEDURE — 80061 LIPID PANEL: CPT

## 2024-09-21 PROCEDURE — 85025 COMPLETE CBC W/AUTO DIFF WBC: CPT

## 2024-09-21 PROCEDURE — 84439 ASSAY OF FREE THYROXINE: CPT

## 2024-09-21 PROCEDURE — 84443 ASSAY THYROID STIM HORMONE: CPT

## 2024-09-21 PROCEDURE — 80053 COMPREHEN METABOLIC PANEL: CPT

## 2024-09-23 RX ORDER — BUPROPION HYDROCHLORIDE 150 MG/1
TABLET ORAL
Qty: 90 TABLET | Refills: 1 | Status: SHIPPED | OUTPATIENT
Start: 2024-09-23

## 2024-09-25 LAB
THYROGLOB AB SERPL-ACNC: 852.2 IU/ML (ref 0–0.9)
THYROPEROXIDASE AB SERPL-ACNC: 376 IU/ML (ref 0–34)

## 2024-09-28 SDOH — ECONOMIC STABILITY: FOOD INSECURITY: WITHIN THE PAST 12 MONTHS, THE FOOD YOU BOUGHT JUST DIDN'T LAST AND YOU DIDN'T HAVE MONEY TO GET MORE.: NEVER TRUE

## 2024-09-28 SDOH — ECONOMIC STABILITY: FOOD INSECURITY: WITHIN THE PAST 12 MONTHS, YOU WORRIED THAT YOUR FOOD WOULD RUN OUT BEFORE YOU GOT MONEY TO BUY MORE.: NEVER TRUE

## 2024-09-28 SDOH — ECONOMIC STABILITY: INCOME INSECURITY: HOW HARD IS IT FOR YOU TO PAY FOR THE VERY BASICS LIKE FOOD, HOUSING, MEDICAL CARE, AND HEATING?: NOT HARD AT ALL

## 2024-09-28 SDOH — ECONOMIC STABILITY: TRANSPORTATION INSECURITY
IN THE PAST 12 MONTHS, HAS LACK OF TRANSPORTATION KEPT YOU FROM MEETINGS, WORK, OR FROM GETTING THINGS NEEDED FOR DAILY LIVING?: NO

## 2024-09-30 NOTE — PROGRESS NOTES
Chief Complaint   Patient presents with    Results     Review of results     Nephrolithiasis    MGUS    Lupus    Hypothyroidism    Hypertension    Depression    Chronic Kidney Disease      \"Have you been to the ER, urgent care clinic since your last visit?  Hospitalized since your last visit?\"    NO    “Have you seen or consulted any other health care providers outside our system since your last visit?”    NO    Have you had a mammogram?”   NO    Date of last Mammogram: 10/31/2022      “Have you had a pap smear?”    NO - nothing noted on [facility] site - [facility]    Date of last Cervical Cancer screen (HPV or PAP): 6/11/2021     Physician order obtained. Patient completed adult immunization consent form.  Allergies, contraindications and recommendations reviewed with patient. Seasonal influenza vaccine administered IM left deltoid and Tdap vaccine administered IM right deltoid. Patient tolerated well.  Patient remained in office for 15 minutes after injection and no adverse reactions were noted.     Lot # [id number] - influenza vaccine   Exp Date: 06/17/2025  NDC # 19371-774-73    Lot #  - [id number]  Exp Date: 11/20/2026  NDC # 74581-295-55

## 2024-09-30 NOTE — PATIENT INSTRUCTIONS
information or to chat online.  When should you call for help?   Call 911 anytime you think you may need emergency care. For example, call if:    You feel like hurting yourself or someone else.     Someone you know has depression and is about to attempt or is attempting suicide.   Where to get help 24 hours a day, 7 days a week   If you or someone you know talks about suicide, self-harm, a mental health crisis, a substance use crisis, or any other kind of emotional distress, get help right away. You can:    Call the Suicide and Crisis Lifeline at 988.     Call 0-203-461-TALK (1-280.194.2567).     Text HOME to 515838 to access the Crisis Text Line.   Consider saving these numbers in your phone.  Go to Grid20/20 for more information or to chat online.  Call your doctor now or seek immediate medical care if:    You hear voices.     Someone you know has depression and:  Starts to give away possessions.  Uses illegal drugs or drinks alcohol heavily.  Talks or writes about death, including writing suicide notes or talking about guns, knives, or pills.  Starts to spend a lot of time alone.  Acts very aggressively or suddenly appears calm.   Watch closely for changes in your health, and be sure to contact your doctor if:    You do not get better as expected.   Where can you learn more?  Go to https://www.[employer]unch[employer].net/patientEd and enter N529 to learn more about \"Recovering From Depression: Care Instructions.\"  Current as of: June 24, 2023  Content Version: 14.1  © 2006-2024 [employer].   Care instructions adapted under license by [employer]. If you have questions about a medical condition or this instruction, always ask your healthcare professional. [employer] disclaims any warranty or liability for your use of this information.

## 2024-10-01 ENCOUNTER — OFFICE VISIT (OUTPATIENT)
Facility: CLINIC | Age: 54
End: 2024-10-01
Payer: COMMERCIAL

## 2024-10-01 VITALS
HEART RATE: 73 BPM | SYSTOLIC BLOOD PRESSURE: 130 MMHG | TEMPERATURE: 97.8 F | HEIGHT: 65 IN | WEIGHT: 137 LBS | BODY MASS INDEX: 22.82 KG/M2 | RESPIRATION RATE: 16 BRPM | DIASTOLIC BLOOD PRESSURE: 72 MMHG | OXYGEN SATURATION: 100 %

## 2024-10-01 DIAGNOSIS — Z12.31 ENCOUNTER FOR SCREENING MAMMOGRAM FOR MALIGNANT NEOPLASM OF BREAST: ICD-10-CM

## 2024-10-01 DIAGNOSIS — M32.14 STAGE III LUPUS NEPHRITIS (WHO) (HCC): ICD-10-CM

## 2024-10-01 DIAGNOSIS — E03.9 ACQUIRED HYPOTHYROIDISM: ICD-10-CM

## 2024-10-01 DIAGNOSIS — M32.9 SYSTEMIC LUPUS ERYTHEMATOSUS, UNSPECIFIED SLE TYPE, UNSPECIFIED ORGAN INVOLVEMENT STATUS (HCC): ICD-10-CM

## 2024-10-01 DIAGNOSIS — Z85.828 HISTORY OF BASAL CELL CANCER: ICD-10-CM

## 2024-10-01 DIAGNOSIS — N20.0 NEPHROLITHIASIS: ICD-10-CM

## 2024-10-01 DIAGNOSIS — Z23 NEED FOR TDAP VACCINATION: ICD-10-CM

## 2024-10-01 DIAGNOSIS — M25.562 PAIN IN BOTH KNEES, UNSPECIFIED CHRONICITY: ICD-10-CM

## 2024-10-01 DIAGNOSIS — E05.90 HYPERTHYROIDISM: ICD-10-CM

## 2024-10-01 DIAGNOSIS — Z23 NEED FOR IMMUNIZATION AGAINST INFLUENZA: ICD-10-CM

## 2024-10-01 DIAGNOSIS — M25.561 PAIN IN BOTH KNEES, UNSPECIFIED CHRONICITY: ICD-10-CM

## 2024-10-01 DIAGNOSIS — F33.0 MILD EPISODE OF RECURRENT MAJOR DEPRESSIVE DISORDER (HCC): ICD-10-CM

## 2024-10-01 DIAGNOSIS — I10 ESSENTIAL (PRIMARY) HYPERTENSION: Primary | ICD-10-CM

## 2024-10-01 DIAGNOSIS — D47.2 MGUS (MONOCLONAL GAMMOPATHY OF UNKNOWN SIGNIFICANCE): ICD-10-CM

## 2024-10-01 PROCEDURE — 3078F DIAST BP <80 MM HG: CPT | Performed by: FAMILY MEDICINE

## 2024-10-01 PROCEDURE — G8427 DOCREV CUR MEDS BY ELIG CLIN: HCPCS | Performed by: FAMILY MEDICINE

## 2024-10-01 PROCEDURE — 99214 OFFICE O/P EST MOD 30 MIN: CPT | Performed by: FAMILY MEDICINE

## 2024-10-01 PROCEDURE — G8484 FLU IMMUNIZE NO ADMIN: HCPCS | Performed by: FAMILY MEDICINE

## 2024-10-01 PROCEDURE — G8420 CALC BMI NORM PARAMETERS: HCPCS | Performed by: FAMILY MEDICINE

## 2024-10-01 PROCEDURE — 1036F TOBACCO NON-USER: CPT | Performed by: FAMILY MEDICINE

## 2024-10-01 PROCEDURE — 90472 IMMUNIZATION ADMIN EACH ADD: CPT | Performed by: FAMILY MEDICINE

## 2024-10-01 PROCEDURE — 3075F SYST BP GE 130 - 139MM HG: CPT | Performed by: FAMILY MEDICINE

## 2024-10-01 PROCEDURE — 90715 TDAP VACCINE 7 YRS/> IM: CPT | Performed by: FAMILY MEDICINE

## 2024-10-01 PROCEDURE — 3017F COLORECTAL CA SCREEN DOC REV: CPT | Performed by: FAMILY MEDICINE

## 2024-10-01 PROCEDURE — 90471 IMMUNIZATION ADMIN: CPT | Performed by: FAMILY MEDICINE

## 2024-10-01 PROCEDURE — 90661 CCIIV3 VAC ABX FR 0.5 ML IM: CPT | Performed by: FAMILY MEDICINE

## 2024-10-01 RX ORDER — TRAZODONE HYDROCHLORIDE 100 MG/1
50 TABLET ORAL
COMMUNITY

## 2024-10-01 RX ORDER — BELIMUMAB 200 MG/ML
SOLUTION SUBCUTANEOUS
COMMUNITY
Start: 2024-09-25

## 2024-10-01 ASSESSMENT — PATIENT HEALTH QUESTIONNAIRE - PHQ9
6. FEELING BAD ABOUT YOURSELF - OR THAT YOU ARE A FAILURE OR HAVE LET YOURSELF OR YOUR FAMILY DOWN: NOT AT ALL
SUM OF ALL RESPONSES TO PHQ QUESTIONS 1-9: 3
1. LITTLE INTEREST OR PLEASURE IN DOING THINGS: NOT AT ALL
8. MOVING OR SPEAKING SO SLOWLY THAT OTHER PEOPLE COULD HAVE NOTICED. OR THE OPPOSITE, BEING SO FIGETY OR RESTLESS THAT YOU HAVE BEEN MOVING AROUND A LOT MORE THAN USUAL: NOT AT ALL
9. THOUGHTS THAT YOU WOULD BE BETTER OFF DEAD, OR OF HURTING YOURSELF: NOT AT ALL
SUM OF ALL RESPONSES TO PHQ QUESTIONS 1-9: 3
10. IF YOU CHECKED OFF ANY PROBLEMS, HOW DIFFICULT HAVE THESE PROBLEMS MADE IT FOR YOU TO DO YOUR WORK, TAKE CARE OF THINGS AT HOME, OR GET ALONG WITH OTHER PEOPLE: SOMEWHAT DIFFICULT
SUM OF ALL RESPONSES TO PHQ9 QUESTIONS 1 & 2: 1
5. POOR APPETITE OR OVEREATING: NOT AT ALL
SUM OF ALL RESPONSES TO PHQ QUESTIONS 1-9: 3
3. TROUBLE FALLING OR STAYING ASLEEP: SEVERAL DAYS
7. TROUBLE CONCENTRATING ON THINGS, SUCH AS READING THE NEWSPAPER OR WATCHING TELEVISION: SEVERAL DAYS
2. FEELING DOWN, DEPRESSED OR HOPELESS: SEVERAL DAYS
4. FEELING TIRED OR HAVING LITTLE ENERGY: NOT AT ALL
SUM OF ALL RESPONSES TO PHQ QUESTIONS 1-9: 3

## 2024-10-01 NOTE — PROGRESS NOTES
SUBJECTIVE  Chief Complaint   Patient presents with    Results     Review of results     Nephrolithiasis    MGUS    Lupus    Hypothyroidism    Hypertension    Depression    Chronic Kidney Disease     No new complaints.      She is seeing rheum, nephrology, and hematology.  She has been diagnosed with MGUS.  She says that she was not sure she had to keep seeing hematology since they are not doing much and things seem stable.  She is understandably having a bit of doctor fatigue.  She says that her diagnosis at this time seems to be SLE and not RA.  It is unclear.    No longer on synthroid and her prior normal TSH levels have actually gone low.  She has no symptoms consistent with hyperthyroidism.     Taking Wellbutrin which is working well.  She had side effects at higher doses.     OBJECTIVE    Blood pressure 130/72, pulse 73, temperature 97.8 °F (36.6 °C), temperature source Tympanic, resp. rate 16, height 1.651 m (5' 5\"), weight 62.1 kg (137 lb), SpO2 100%.   General:  Alert, cooperative, well appearing, in no apparent distress.  CV:  The heart sounds are regular in rate and rhythm.  There is a normal S1 and S2.  There or no murmurs.   Lungs:  Inspiratory and expiratory efforts are full and unlabored.   Ext: no swelling.  Psych: Mood good consistent with affect.  Oriented x 3.  Judgement and insight intact.      Latest Reference Range & Units 09/21/24 10:00   Sodium 136 - 145 mmol/L 138   Potassium 3.5 - 5.5 mmol/L 4.3   Chloride 100 - 111 mmol/L 107   CARBON DIOXIDE 21 - 32 mmol/L 26   BUN,BUNPL 7.0 - 18 MG/DL 13   Creatinine 0.6 - 1.3 MG/DL 0.85   Bun/Cre 12 - 20   15   Anion Gap 3.0 - 18 mmol/L 5   Est, Glom Filt Rate >60 ml/min/1.73m2 81   Glucose 74 - 99 mg/dL 85   Calcium 8.5 - 10.1 MG/DL 9.5   Albumin/Globulin Ratio 0.8 - 1.7   1.0   Total Protein 6.4 - 8.2 g/dL 7.9   LIPID PANEL -       Chol/HDL Ratio 0 - 5.0   2.6   Cholesterol, Total <200 MG/   HDL Cholesterol 40 - 60 MG/DL 64 (H)   LDL Cholesterol

## 2024-10-02 ENCOUNTER — TELEPHONE (OUTPATIENT)
Age: 54
End: 2024-10-02

## 2024-10-02 NOTE — TELEPHONE ENCOUNTER
Pt last saw Dr. 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 Wants to know if he does Cryo Ablation or an electro therapy in place of surgery.    callback # [phone]

## 2024-10-02 NOTE — TELEPHONE ENCOUNTER
Dr. Lucia does not do Cryotherapy.  In regards to electrotherapy, is patient referring to using a TENS unit?

## 2024-10-02 NOTE — TELEPHONE ENCOUNTER
Spoke with patient. Patient understood message per Tianna. Wants to hold off on appointment at this time as she is interested in more information involving cryo therapy.

## 2024-10-11 ENCOUNTER — HOSPITAL ENCOUNTER (OUTPATIENT)
Facility: HOSPITAL | Age: 54
Discharge: HOME | End: 2024-10-14
Attending: FAMILY MEDICINE
Payer: COMMERCIAL

## 2024-10-11 DIAGNOSIS — M25.561 PAIN IN BOTH KNEES, UNSPECIFIED CHRONICITY: ICD-10-CM

## 2024-10-11 DIAGNOSIS — M25.562 PAIN IN BOTH KNEES, UNSPECIFIED CHRONICITY: ICD-10-CM

## 2024-10-11 PROCEDURE — 73560 X-RAY EXAM OF KNEE 1 OR 2: CPT

## 2025-03-20 RX ORDER — BUPROPION HYDROCHLORIDE 150 MG/1
150 TABLET ORAL DAILY
Qty: 90 TABLET | Refills: 1 | Status: SHIPPED | OUTPATIENT
Start: 2025-03-20

## 2025-03-29 SDOH — ECONOMIC STABILITY: FOOD INSECURITY: WITHIN THE PAST 12 MONTHS, YOU WORRIED THAT YOUR FOOD WOULD RUN OUT BEFORE YOU GOT MONEY TO BUY MORE.: NEVER TRUE

## 2025-03-29 SDOH — ECONOMIC STABILITY: INCOME INSECURITY: IN THE LAST 12 MONTHS, WAS THERE A TIME WHEN YOU WERE NOT ABLE TO PAY THE MORTGAGE OR RENT ON TIME?: NO

## 2025-03-29 SDOH — ECONOMIC STABILITY: TRANSPORTATION INSECURITY
IN THE PAST 12 MONTHS, HAS THE LACK OF TRANSPORTATION KEPT YOU FROM MEDICAL APPOINTMENTS OR FROM GETTING MEDICATIONS?: NO

## 2025-03-29 SDOH — ECONOMIC STABILITY: FOOD INSECURITY: WITHIN THE PAST 12 MONTHS, THE FOOD YOU BOUGHT JUST DIDN'T LAST AND YOU DIDN'T HAVE MONEY TO GET MORE.: NEVER TRUE

## 2025-03-31 NOTE — PATIENT INSTRUCTIONS
as they are prescribed. You may start to feel better within 1 to 3 weeks of taking antidepressant medicine. But it can take as many as 6 to 8 weeks to see more improvement. If you have questions or concerns about your medicines, or if you do not notice any improvement by 3 weeks, talk to your doctor.  Continue to take your medicine after your symptoms improve. Taking your medicine for at least 6 months after you feel better can help keep you from getting depressed again. If this isn't the first time you have been depressed, your doctor may recommend you to take medicine even longer.  If you have any side effects from your medicine, tell your doctor. Many side effects are mild and will go away on their own after you have been taking the medicine for a few weeks. Some may last longer. Talk to your doctor if side effects are bothering you too much. You might be able to try a different medicine.  Continue counseling. It may help prevent depression from returning, especially if you've had multiple episodes of depression. Talk with your counselor if you are having a hard time attending your sessions or you think the sessions aren't working. Don't just stop going.  Get enough sleep. Talk to your doctor if you are having problems sleeping.  Avoid sleeping pills unless they are prescribed by the doctor treating your depression. Sleeping pills may make you groggy during the day, and they may interact with other medicine you are taking.  If you have any other illnesses, such as diabetes, heart disease, or high blood pressure, make sure to continue with your treatment. Tell your doctor about all of the medicines you take, including those with or without a prescription.  Where to get help 24 hours a day, 7 days a week   If you or someone you know talks about suicide, self-harm, a mental health crisis, a substance use crisis, or any other kind of emotional distress, get help right away. You can:  Call the Suicide and Crisis

## 2025-04-01 ENCOUNTER — OFFICE VISIT (OUTPATIENT)
Facility: CLINIC | Age: 55
End: 2025-04-01
Payer: COMMERCIAL

## 2025-04-01 VITALS
RESPIRATION RATE: 18 BRPM | DIASTOLIC BLOOD PRESSURE: 70 MMHG | TEMPERATURE: 98.9 F | HEIGHT: 65 IN | HEART RATE: 78 BPM | OXYGEN SATURATION: 99 % | BODY MASS INDEX: 23.82 KG/M2 | WEIGHT: 143 LBS | SYSTOLIC BLOOD PRESSURE: 132 MMHG

## 2025-04-01 DIAGNOSIS — I10 ESSENTIAL (PRIMARY) HYPERTENSION: Primary | ICD-10-CM

## 2025-04-01 DIAGNOSIS — Z85.828 HISTORY OF BASAL CELL CANCER: ICD-10-CM

## 2025-04-01 DIAGNOSIS — R87.610 ASCUS OF CERVIX WITH NEGATIVE HIGH RISK HPV: ICD-10-CM

## 2025-04-01 DIAGNOSIS — Z23 NEED FOR PNEUMOCOCCAL 20-VALENT CONJUGATE VACCINATION: ICD-10-CM

## 2025-04-01 DIAGNOSIS — M32.9 SYSTEMIC LUPUS ERYTHEMATOSUS, UNSPECIFIED SLE TYPE, UNSPECIFIED ORGAN INVOLVEMENT STATUS (HCC): ICD-10-CM

## 2025-04-01 DIAGNOSIS — M32.14 STAGE III LUPUS NEPHRITIS (WHO) (HCC): ICD-10-CM

## 2025-04-01 DIAGNOSIS — M17.10 ARTHRITIS OF KNEE: ICD-10-CM

## 2025-04-01 DIAGNOSIS — D47.2 MGUS (MONOCLONAL GAMMOPATHY OF UNKNOWN SIGNIFICANCE): ICD-10-CM

## 2025-04-01 DIAGNOSIS — E05.00 GRAVES DISEASE: ICD-10-CM

## 2025-04-01 DIAGNOSIS — F33.0 MILD EPISODE OF RECURRENT MAJOR DEPRESSIVE DISORDER: ICD-10-CM

## 2025-04-01 DIAGNOSIS — N20.0 NEPHROLITHIASIS: ICD-10-CM

## 2025-04-01 PROCEDURE — 90677 PCV20 VACCINE IM: CPT | Performed by: FAMILY MEDICINE

## 2025-04-01 PROCEDURE — G8427 DOCREV CUR MEDS BY ELIG CLIN: HCPCS | Performed by: FAMILY MEDICINE

## 2025-04-01 PROCEDURE — 90471 IMMUNIZATION ADMIN: CPT | Performed by: FAMILY MEDICINE

## 2025-04-01 PROCEDURE — G8420 CALC BMI NORM PARAMETERS: HCPCS | Performed by: FAMILY MEDICINE

## 2025-04-01 PROCEDURE — 1036F TOBACCO NON-USER: CPT | Performed by: FAMILY MEDICINE

## 2025-04-01 PROCEDURE — 3078F DIAST BP <80 MM HG: CPT | Performed by: FAMILY MEDICINE

## 2025-04-01 PROCEDURE — 3075F SYST BP GE 130 - 139MM HG: CPT | Performed by: FAMILY MEDICINE

## 2025-04-01 PROCEDURE — 3017F COLORECTAL CA SCREEN DOC REV: CPT | Performed by: FAMILY MEDICINE

## 2025-04-01 PROCEDURE — 99214 OFFICE O/P EST MOD 30 MIN: CPT | Performed by: FAMILY MEDICINE

## 2025-04-01 RX ORDER — GARLIC 180 MG
40 TABLET, DELAYED RELEASE (ENTERIC COATED) ORAL 2 TIMES DAILY
COMMUNITY

## 2025-04-01 RX ORDER — METHIMAZOLE 5 MG/1
TABLET ORAL DAILY
COMMUNITY
Start: 2024-11-27

## 2025-04-01 RX ORDER — LEVOTHYROXINE SODIUM 50 UG/1
50 TABLET ORAL DAILY
COMMUNITY
Start: 2025-03-04

## 2025-04-01 RX ORDER — CALCIUM CARB, CITRATE, MALATE 250 MG
200 CAPSULE ORAL DAILY
COMMUNITY

## 2025-04-01 ASSESSMENT — PATIENT HEALTH QUESTIONNAIRE - PHQ9
SUM OF ALL RESPONSES TO PHQ QUESTIONS 1-9: 4
2. FEELING DOWN, DEPRESSED OR HOPELESS: SEVERAL DAYS
9. THOUGHTS THAT YOU WOULD BE BETTER OFF DEAD, OR OF HURTING YOURSELF: NOT AT ALL
1. LITTLE INTEREST OR PLEASURE IN DOING THINGS: NOT AT ALL
5. POOR APPETITE OR OVEREATING: NOT AT ALL
8. MOVING OR SPEAKING SO SLOWLY THAT OTHER PEOPLE COULD HAVE NOTICED. OR THE OPPOSITE, BEING SO FIGETY OR RESTLESS THAT YOU HAVE BEEN MOVING AROUND A LOT MORE THAN USUAL: NOT AT ALL
SUM OF ALL RESPONSES TO PHQ QUESTIONS 1-9: 4
6. FEELING BAD ABOUT YOURSELF - OR THAT YOU ARE A FAILURE OR HAVE LET YOURSELF OR YOUR FAMILY DOWN: SEVERAL DAYS
3. TROUBLE FALLING OR STAYING ASLEEP: SEVERAL DAYS
SUM OF ALL RESPONSES TO PHQ QUESTIONS 1-9: 4
10. IF YOU CHECKED OFF ANY PROBLEMS, HOW DIFFICULT HAVE THESE PROBLEMS MADE IT FOR YOU TO DO YOUR WORK, TAKE CARE OF THINGS AT HOME, OR GET ALONG WITH OTHER PEOPLE: SOMEWHAT DIFFICULT
4. FEELING TIRED OR HAVING LITTLE ENERGY: NOT AT ALL
SUM OF ALL RESPONSES TO PHQ QUESTIONS 1-9: 4
7. TROUBLE CONCENTRATING ON THINGS, SUCH AS READING THE NEWSPAPER OR WATCHING TELEVISION: SEVERAL DAYS

## 2025-04-01 NOTE — PROGRESS NOTES
Chief Complaint   Patient presents with    Chronic Kidney Disease    Depression    Hypertension    Hypothyroidism    Lupus    MGUS      \"Have you been to the ER, urgent care clinic since your last visit?  Hospitalized since your last visit?\"    NO    “Have you seen or consulted any other health care providers outside our system since your last visit?”    YES, under the care of rheumatology, endocrinology for hyperthyroidism, nephrology for proteinuria, and OB-GYN for abnormal endometrial sampling results     “Have you had a pap smear?”    YES - Where: 01/31/2025 [facility] to request most recent records if not in the chart    Physician order obtained. Patient completed adult immunization consent form.  Allergies, contraindications and recommendations reviewed with patient. PCV-20 vaccine administered IM left deltoid.  Patient tolerated well.  Patient remained in office for 15 minutes after injection and no adverse reactions were noted.     Lot # [id number]  Exp Date: 05/31/2026  NDC # 3348-5825-21    Date of last Cervical Cancer screen (HPV or PAP): 6/11/2021            
and meds updated.    SLE -  we will cont to follow along.  She will undergo some medication adjustments per rheum. Defer to rheum for management.     Stage 3 lupus nephritis - cont per nephrology.  Reviewed notes.  Agree with careplan.    Depression - cont wellbutrin as this is controlled.  Refills as needed.    MGUS - Cont per hematology.  Agree with care plan and follow along.      Nephrolithiasis - noted incidentally on kidney ultrasound.  We will simply watch for now.    History of basal cell ca, skin - cont per derm with routine surveillance.     Knee arthritis - doing well at this time with managing conservatively.     ASCUS - noted and scanning results to chart.  Cont per OB/GYN.     PCV20 administered.     All chart history elements were reviewed by me at the time of the visit even though marked at time of note closure. Patient understands our medical plan. Patient has provided input and agrees with goals. Alternatives have been explained and offered.  All questions answered.  The patient is to call if condition worsens or fails to improve.     Return in about 6 months (around 10/1/2025) for routine care.

## 2025-05-28 ENCOUNTER — PATIENT MESSAGE (OUTPATIENT)
Facility: CLINIC | Age: 55
End: 2025-05-28

## 2025-05-28 RX ORDER — PREDNISONE 10 MG/1
TABLET ORAL
Qty: 30 TABLET | Refills: 0 | Status: SHIPPED | OUTPATIENT
Start: 2025-05-28

## 2025-05-28 NOTE — TELEPHONE ENCOUNTER
Started Monday evening and progressing.  Says it is bumpy.  Has been in her flower bed before this developed.  Started the evening of that.  But says didn't touch anything but pulled three weeds.  Rash is located on mid-shin to midthgh. Benadryl with some relief.     Start on prednisone taper.    Took a higher black cohosh last week.

## 2025-07-02 NOTE — TELEPHONE ENCOUNTER
This pharmacy faxed office for the following prescriptions to be filled:    Medication requested :   buPROPion (WELLBUTRIN XL) 150 MG extended release tablet QTY 90 refill 1  PCP: Bonifacio  Pharmacy or Print: [facility]   Mail order or Local pharmacy Mail Order   Last office visit 4/1/2025  Next office visit 10/6/2025  Pharmacy change

## 2025-07-06 RX ORDER — BUPROPION HYDROCHLORIDE 150 MG/1
150 TABLET ORAL DAILY
Qty: 90 TABLET | Refills: 1 | Status: SHIPPED | OUTPATIENT
Start: 2025-07-06

## 2025-07-07 RX ORDER — BUPROPION HYDROCHLORIDE 150 MG/1
150 TABLET ORAL DAILY
Qty: 90 TABLET | Refills: 1 | Status: SHIPPED | OUTPATIENT
Start: 2025-07-07

## 2025-07-07 NOTE — TELEPHONE ENCOUNTER
This pharmacy faxed over request for the following prescriptions to be filled:    Medication requested : Bupropion   PCP: Dr. Valdovinos   Pharmacy or Print: [facility]   Mail order or Local pharmacy Mail Order     Scheduled appointment if not seen by current providers in office: [facility]  04/01/2025, F/u 10/06/2025